# Patient Record
Sex: FEMALE | Race: BLACK OR AFRICAN AMERICAN | Employment: FULL TIME | URBAN - METROPOLITAN AREA
[De-identification: names, ages, dates, MRNs, and addresses within clinical notes are randomized per-mention and may not be internally consistent; named-entity substitution may affect disease eponyms.]

---

## 2017-05-01 ENCOUNTER — HOSPITAL ENCOUNTER (EMERGENCY)
Age: 62
Discharge: HOME | End: 2017-05-01
Payer: MEDICARE

## 2017-05-01 ENCOUNTER — APPOINTMENT (OUTPATIENT)
Dept: GENERAL RADIOLOGY | Age: 62
End: 2017-05-01
Payer: MEDICARE

## 2017-05-01 VITALS
HEART RATE: 86 BPM | WEIGHT: 260.4 LBS | BODY MASS INDEX: 44.46 KG/M2 | RESPIRATION RATE: 14 BRPM | TEMPERATURE: 97.8 F | HEIGHT: 64 IN | OXYGEN SATURATION: 98 % | DIASTOLIC BLOOD PRESSURE: 58 MMHG | SYSTOLIC BLOOD PRESSURE: 134 MMHG

## 2017-05-01 DIAGNOSIS — S80.02XA CONTUSION OF LEFT KNEE, INITIAL ENCOUNTER: Primary | ICD-10-CM

## 2017-05-01 PROCEDURE — 73562 X-RAY EXAM OF KNEE 3: CPT

## 2017-05-01 PROCEDURE — 99283 EMERGENCY DEPT VISIT LOW MDM: CPT

## 2017-05-01 RX ORDER — ACETAMINOPHEN AND CODEINE PHOSPHATE 300; 30 MG/1; MG/1
1-2 TABLET ORAL 3 TIMES DAILY PRN
Qty: 20 TABLET | Refills: 0 | Status: SHIPPED | OUTPATIENT
Start: 2017-05-01 | End: 2017-08-30 | Stop reason: CLARIF

## 2017-05-01 ASSESSMENT — PAIN DESCRIPTION - ORIENTATION: ORIENTATION: LEFT

## 2017-05-01 ASSESSMENT — PAIN SCALES - GENERAL: PAINLEVEL_OUTOF10: 8

## 2017-05-01 ASSESSMENT — PAIN DESCRIPTION - LOCATION: LOCATION: KNEE

## 2017-05-01 ASSESSMENT — PAIN DESCRIPTION - PAIN TYPE: TYPE: ACUTE PAIN

## 2017-06-05 ENCOUNTER — TELEPHONE (OUTPATIENT)
Dept: BARIATRICS/WEIGHT MGMT | Age: 62
End: 2017-06-05

## 2017-06-30 ENCOUNTER — OFFICE VISIT (OUTPATIENT)
Dept: BARIATRICS/WEIGHT MGMT | Age: 62
End: 2017-06-30
Payer: MEDICARE

## 2017-06-30 ENCOUNTER — NURSE ONLY (OUTPATIENT)
Dept: BARIATRICS/WEIGHT MGMT | Age: 62
End: 2017-06-30

## 2017-06-30 VITALS
HEART RATE: 74 BPM | WEIGHT: 266 LBS | HEIGHT: 64 IN | SYSTOLIC BLOOD PRESSURE: 126 MMHG | RESPIRATION RATE: 20 BRPM | DIASTOLIC BLOOD PRESSURE: 74 MMHG | BODY MASS INDEX: 45.41 KG/M2

## 2017-06-30 VITALS — BODY MASS INDEX: 45.66 KG/M2 | WEIGHT: 266 LBS

## 2017-06-30 DIAGNOSIS — R73.03 PREDIABETES: Primary | ICD-10-CM

## 2017-06-30 DIAGNOSIS — E66.01 OBESITY, MORBID, BMI 40.0-49.9 (HCC): ICD-10-CM

## 2017-06-30 PROCEDURE — 99213 OFFICE O/P EST LOW 20 MIN: CPT | Performed by: NURSE PRACTITIONER

## 2017-07-07 ENCOUNTER — HOSPITAL ENCOUNTER (OUTPATIENT)
Age: 62
Discharge: HOME | End: 2017-07-07
Payer: MEDICARE

## 2017-07-07 PROCEDURE — 93005 ELECTROCARDIOGRAM TRACING: CPT

## 2017-07-08 LAB
EKG ATRIAL RATE: 98 BPM
EKG P AXIS: 57 DEGREES
EKG P-R INTERVAL: 140 MS
EKG Q-T INTERVAL: 376 MS
EKG QRS DURATION: 76 MS
EKG QTC CALCULATION (BAZETT): 480 MS
EKG R AXIS: 24 DEGREES
EKG T AXIS: 36 DEGREES
EKG VENTRICULAR RATE: 98 BPM

## 2017-07-09 ENCOUNTER — HOSPITAL ENCOUNTER (OUTPATIENT)
Age: 62
Discharge: HOME | End: 2017-07-09
Payer: MEDICARE

## 2017-07-09 DIAGNOSIS — R73.03 PREDIABETES: ICD-10-CM

## 2017-07-09 DIAGNOSIS — E66.01 OBESITY, MORBID, BMI 40.0-49.9 (HCC): ICD-10-CM

## 2017-07-09 LAB
ABSOLUTE EOS #: 0.2 K/UL (ref 0–0.4)
ABSOLUTE LYMPH #: 1.6 K/UL (ref 1–4.8)
ABSOLUTE MONO #: 0.5 K/UL (ref 0.1–1.2)
ALBUMIN SERPL-MCNC: 3.6 G/DL (ref 3.5–5.2)
ALBUMIN/GLOBULIN RATIO: 0.9 (ref 1–2.5)
ALP BLD-CCNC: 81 U/L (ref 35–104)
ALT SERPL-CCNC: 12 U/L (ref 5–33)
ANION GAP SERPL CALCULATED.3IONS-SCNC: 11 MMOL/L (ref 9–17)
AST SERPL-CCNC: 15 U/L
BASOPHILS # BLD: 1 %
BASOPHILS ABSOLUTE: 0 K/UL (ref 0–0.2)
BILIRUB SERPL-MCNC: 0.28 MG/DL (ref 0.3–1.2)
BUN BLDV-MCNC: 10 MG/DL (ref 8–23)
BUN/CREAT BLD: ABNORMAL (ref 9–20)
CALCIUM SERPL-MCNC: 9.6 MG/DL (ref 8.6–10.4)
CHLORIDE BLD-SCNC: 104 MMOL/L (ref 98–107)
CHOLESTEROL/HDL RATIO: 2.1
CHOLESTEROL: 124 MG/DL
CO2: 27 MMOL/L (ref 20–31)
CREAT SERPL-MCNC: 0.86 MG/DL (ref 0.5–0.9)
DIFFERENTIAL TYPE: ABNORMAL
EOSINOPHILS RELATIVE PERCENT: 4 %
GFR AFRICAN AMERICAN: >60 ML/MIN
GFR NON-AFRICAN AMERICAN: >60 ML/MIN
GFR SERPL CREATININE-BSD FRML MDRD: ABNORMAL ML/MIN/{1.73_M2}
GFR SERPL CREATININE-BSD FRML MDRD: ABNORMAL ML/MIN/{1.73_M2}
GLUCOSE BLD-MCNC: 111 MG/DL (ref 70–99)
HCT VFR BLD CALC: 39 % (ref 36–46)
HDLC SERPL-MCNC: 58 MG/DL
HEMOGLOBIN: 13 G/DL (ref 12–16)
LDL CHOLESTEROL: 47 MG/DL (ref 0–130)
LYMPHOCYTES # BLD: 29 %
MCH RBC QN AUTO: 28.2 PG (ref 26–34)
MCHC RBC AUTO-ENTMCNC: 33.2 G/DL (ref 31–37)
MCV RBC AUTO: 84.8 FL (ref 80–100)
MONOCYTES # BLD: 10 %
PDW BLD-RTO: 16 % (ref 12.5–15.4)
PLATELET # BLD: 276 K/UL (ref 140–450)
PLATELET ESTIMATE: ABNORMAL
PMV BLD AUTO: 8.7 FL (ref 6–12)
POTASSIUM SERPL-SCNC: 4.1 MMOL/L (ref 3.7–5.3)
RBC # BLD: 4.61 M/UL (ref 4–5.2)
RBC # BLD: ABNORMAL 10*6/UL
SEG NEUTROPHILS: 56 %
SEGMENTED NEUTROPHILS ABSOLUTE COUNT: 3.2 K/UL (ref 1.8–7.7)
SODIUM BLD-SCNC: 142 MMOL/L (ref 135–144)
TOTAL PROTEIN: 7.4 G/DL (ref 6.4–8.3)
TRIGL SERPL-MCNC: 97 MG/DL
VLDLC SERPL CALC-MCNC: NORMAL MG/DL (ref 1–30)
WBC # BLD: 5.6 K/UL (ref 3.5–11)
WBC # BLD: ABNORMAL 10*3/UL

## 2017-07-09 PROCEDURE — 80061 LIPID PANEL: CPT

## 2017-07-09 PROCEDURE — 80053 COMPREHEN METABOLIC PANEL: CPT

## 2017-07-09 PROCEDURE — 83036 HEMOGLOBIN GLYCOSYLATED A1C: CPT

## 2017-07-09 PROCEDURE — 36415 COLL VENOUS BLD VENIPUNCTURE: CPT

## 2017-07-09 PROCEDURE — 85025 COMPLETE CBC W/AUTO DIFF WBC: CPT

## 2017-07-10 DIAGNOSIS — E66.01 OBESITY, MORBID, BMI 40.0-49.9 (HCC): ICD-10-CM

## 2017-07-10 DIAGNOSIS — R73.03 PREDIABETES: Primary | ICD-10-CM

## 2017-07-10 LAB
ESTIMATED AVERAGE GLUCOSE: 137 MG/DL
HBA1C MFR BLD: 6.4 % (ref 4–6)

## 2017-07-27 ENCOUNTER — OFFICE VISIT (OUTPATIENT)
Dept: BARIATRICS/WEIGHT MGMT | Age: 62
End: 2017-07-27
Payer: MEDICARE

## 2017-07-27 VITALS
WEIGHT: 270 LBS | DIASTOLIC BLOOD PRESSURE: 72 MMHG | HEIGHT: 64 IN | BODY MASS INDEX: 46.1 KG/M2 | SYSTOLIC BLOOD PRESSURE: 126 MMHG | HEART RATE: 95 BPM

## 2017-07-27 DIAGNOSIS — E66.01 OBESITY, MORBID, BMI 40.0-49.9 (HCC): ICD-10-CM

## 2017-07-27 DIAGNOSIS — R73.03 PREDIABETES: Primary | ICD-10-CM

## 2017-07-27 PROCEDURE — 99213 OFFICE O/P EST LOW 20 MIN: CPT | Performed by: NURSE PRACTITIONER

## 2017-08-24 ENCOUNTER — TELEPHONE (OUTPATIENT)
Dept: BARIATRICS/WEIGHT MGMT | Age: 62
End: 2017-08-24

## 2017-08-24 ENCOUNTER — OFFICE VISIT (OUTPATIENT)
Dept: BARIATRICS/WEIGHT MGMT | Age: 62
End: 2017-08-24
Payer: MEDICARE

## 2017-08-24 VITALS
HEIGHT: 64 IN | DIASTOLIC BLOOD PRESSURE: 74 MMHG | SYSTOLIC BLOOD PRESSURE: 132 MMHG | RESPIRATION RATE: 20 BRPM | WEIGHT: 268 LBS | HEART RATE: 92 BPM | BODY MASS INDEX: 45.75 KG/M2

## 2017-08-24 DIAGNOSIS — E66.01 OBESITY, MORBID, BMI 40.0-49.9 (HCC): ICD-10-CM

## 2017-08-24 DIAGNOSIS — R73.03 PREDIABETES: Primary | ICD-10-CM

## 2017-08-24 PROCEDURE — 99213 OFFICE O/P EST LOW 20 MIN: CPT | Performed by: NURSE PRACTITIONER

## 2017-08-30 ENCOUNTER — HOSPITAL ENCOUNTER (EMERGENCY)
Age: 62
Discharge: HOME | End: 2017-08-30
Attending: EMERGENCY MEDICINE
Payer: MEDICARE

## 2017-08-30 ENCOUNTER — APPOINTMENT (OUTPATIENT)
Dept: GENERAL RADIOLOGY | Age: 62
End: 2017-08-30
Payer: MEDICARE

## 2017-08-30 VITALS
WEIGHT: 267 LBS | DIASTOLIC BLOOD PRESSURE: 60 MMHG | SYSTOLIC BLOOD PRESSURE: 142 MMHG | RESPIRATION RATE: 16 BRPM | HEART RATE: 86 BPM | BODY MASS INDEX: 45.58 KG/M2 | HEIGHT: 64 IN | OXYGEN SATURATION: 97 % | TEMPERATURE: 99.3 F

## 2017-08-30 DIAGNOSIS — R03.0 ELEVATED BLOOD PRESSURE READING: ICD-10-CM

## 2017-08-30 DIAGNOSIS — M17.11 OSTEOARTHRITIS OF RIGHT KNEE, UNSPECIFIED OSTEOARTHRITIS TYPE: Primary | ICD-10-CM

## 2017-08-30 PROCEDURE — 73562 X-RAY EXAM OF KNEE 3: CPT

## 2017-08-30 PROCEDURE — 96372 THER/PROPH/DIAG INJ SC/IM: CPT

## 2017-08-30 PROCEDURE — 99283 EMERGENCY DEPT VISIT LOW MDM: CPT

## 2017-08-30 PROCEDURE — 6360000002 HC RX W HCPCS: Performed by: PHYSICIAN ASSISTANT

## 2017-08-30 RX ORDER — KETOROLAC TROMETHAMINE 30 MG/ML
60 INJECTION, SOLUTION INTRAMUSCULAR; INTRAVENOUS ONCE
Status: COMPLETED | OUTPATIENT
Start: 2017-08-30 | End: 2017-08-30

## 2017-08-30 RX ORDER — NAPROXEN 500 MG/1
500 TABLET ORAL 2 TIMES DAILY WITH MEALS
Qty: 20 TABLET | Refills: 0 | Status: SHIPPED | OUTPATIENT
Start: 2017-08-30 | End: 2017-09-28

## 2017-08-30 RX ORDER — HYDROCODONE BITARTRATE AND ACETAMINOPHEN 5; 325 MG/1; MG/1
1 TABLET ORAL EVERY 6 HOURS PRN
Qty: 20 TABLET | Refills: 0 | Status: SHIPPED | OUTPATIENT
Start: 2017-08-30 | End: 2017-09-06

## 2017-08-30 RX ADMIN — KETOROLAC TROMETHAMINE 60 MG: 30 INJECTION, SOLUTION INTRAMUSCULAR at 22:34

## 2017-08-30 ASSESSMENT — PAIN SCALES - GENERAL
PAINLEVEL_OUTOF10: 9
PAINLEVEL_OUTOF10: 9

## 2017-08-30 ASSESSMENT — PAIN DESCRIPTION - DESCRIPTORS: DESCRIPTORS: ACHING

## 2017-08-30 ASSESSMENT — PAIN DESCRIPTION - ORIENTATION: ORIENTATION: RIGHT

## 2017-08-30 ASSESSMENT — PAIN DESCRIPTION - FREQUENCY: FREQUENCY: CONTINUOUS

## 2017-08-30 ASSESSMENT — PAIN DESCRIPTION - LOCATION: LOCATION: KNEE

## 2017-08-31 ENCOUNTER — TELEPHONE (OUTPATIENT)
Dept: ORTHOPEDIC SURGERY | Age: 62
End: 2017-08-31

## 2017-09-21 ENCOUNTER — OFFICE VISIT (OUTPATIENT)
Dept: BARIATRICS/WEIGHT MGMT | Age: 62
End: 2017-09-21
Payer: MEDICARE

## 2017-09-21 VITALS
HEART RATE: 104 BPM | HEIGHT: 64 IN | DIASTOLIC BLOOD PRESSURE: 78 MMHG | BODY MASS INDEX: 45.07 KG/M2 | SYSTOLIC BLOOD PRESSURE: 120 MMHG | WEIGHT: 264 LBS

## 2017-09-21 DIAGNOSIS — R73.03 PREDIABETES: Primary | ICD-10-CM

## 2017-09-21 DIAGNOSIS — E66.01 OBESITY, MORBID, BMI 40.0-49.9 (HCC): ICD-10-CM

## 2017-09-21 DIAGNOSIS — M17.11 ARTHRITIS OF RIGHT KNEE: ICD-10-CM

## 2017-09-21 PROCEDURE — 99213 OFFICE O/P EST LOW 20 MIN: CPT | Performed by: NURSE PRACTITIONER

## 2017-09-28 ENCOUNTER — OFFICE VISIT (OUTPATIENT)
Dept: ORTHOPEDIC SURGERY | Age: 62
End: 2017-09-28
Payer: MEDICARE

## 2017-09-28 VITALS — HEIGHT: 65 IN | BODY MASS INDEX: 43.99 KG/M2 | WEIGHT: 264 LBS

## 2017-09-28 DIAGNOSIS — M17.11 PRIMARY OSTEOARTHRITIS OF RIGHT KNEE: Primary | ICD-10-CM

## 2017-09-28 DIAGNOSIS — G89.29 CHRONIC PAIN OF RIGHT KNEE: ICD-10-CM

## 2017-09-28 DIAGNOSIS — M25.561 CHRONIC PAIN OF RIGHT KNEE: ICD-10-CM

## 2017-09-28 DIAGNOSIS — E66.01 OBESITY, MORBID, BMI 40.0-49.9 (HCC): ICD-10-CM

## 2017-09-28 PROCEDURE — 99204 OFFICE O/P NEW MOD 45 MIN: CPT | Performed by: ORTHOPAEDIC SURGERY

## 2017-09-28 RX ORDER — HYDROCODONE BITARTRATE AND ACETAMINOPHEN 5; 325 MG/1; MG/1
1 TABLET ORAL 2 TIMES DAILY
COMMUNITY
Start: 2017-08-30 | End: 2017-09-28 | Stop reason: ALTCHOICE

## 2017-10-02 ENCOUNTER — TELEPHONE (OUTPATIENT)
Dept: ORTHOPEDIC SURGERY | Age: 62
End: 2017-10-02

## 2017-10-04 RX ORDER — IBUPROFEN 800 MG/1
800 TABLET ORAL EVERY 8 HOURS PRN
Qty: 120 TABLET | Refills: 3 | Status: SHIPPED | OUTPATIENT
Start: 2017-10-04 | End: 2021-02-15

## 2017-10-05 ENCOUNTER — HOSPITAL ENCOUNTER (OUTPATIENT)
Dept: PHYSICAL THERAPY | Facility: CLINIC | Age: 62
Setting detail: THERAPIES SERIES
Discharge: HOME | End: 2017-10-05
Payer: MEDICARE

## 2017-10-05 PROCEDURE — 97110 THERAPEUTIC EXERCISES: CPT

## 2017-10-05 PROCEDURE — 97162 PT EVAL MOD COMPLEX 30 MIN: CPT

## 2017-10-05 NOTE — CONSULTS
Gait Deviations trouble with stairs/not prolonged walking  [] Other:      STG: (to be met in 6 treatments)  1. ? Pain: 3/10 max  2. ? ROM:flex 115A, EXT -5A  3. ? Strength: 5/5 R hip and knee  4. ? Function:improve steps, begin walking for longer periods of time  5. Independent with Home Exercise Programs    LTG: (to be met in 12 treatments)  1. Pain 2 or less  2. Normal gait on level surfaces and stairs. 3. Care for her mom w/o difficulty                   Patient goals:improve knee, remove pain    Rehab Potential:  [x] Good  [] Fair  [] Poor   Suggested Professional Referral:  [x] No  [] Yes:  Barriers to Goal Achievement[de-identified]  [] No  [x] Yes: OA   Domestic Concerns:  [x] No  [] Yes:    Pt. Education:  [x] Plans/Goals, Risks/Benefits discussed  [x] Home exercise program    Method of Education: [x] Verbal  [x] Demo  [x] Written see ex's below  Comprehension of Education:  [x] Verbalizes understanding. [x] Demonstrates understanding. [] Needs Review. [] Demonstrates/verbalizes understanding of HEP/Ed previously given. Treatment Plan:  [x] Therapeutic Exercise    [] Modalities:  [] Therapeutic Activity    [] Ultrasound  [] Electrical Stimulation  [] Gait Training     [] Massage       [] Lumbar/Cervical Traction  [] Neuromuscular Re-education [x] Cold/hotpack PRN [] Iontophoresis: 4 mg/mL  [x] Instruction in HEP             Dexamethasone Sodium  [] Manual Therapy             Phosphate 40-80 mAmin  [] Aquatic Therapy  [] Vasocompression/    [] Other:       Game Ready    []  Medication allergies reviewed for use of    Dexamethasone Sodium Phosphate 4mg/ml     with iontophoresis treatments. Pt is not allergic. Frequency:  2 x/week for 12 visits- may decrease to 1x/week per pts request once she feels she has a handle on the exercises.         Todays Treatment:  Modalities:   Precautions:  Exercise Reps/ Time Weight/ Level Comments   SCI-FIT      PROM      PATELLA MOBS            QUAD SETS 15 1#    HS SETS

## 2017-10-10 ENCOUNTER — HOSPITAL ENCOUNTER (OUTPATIENT)
Dept: PHYSICAL THERAPY | Facility: CLINIC | Age: 62
Setting detail: THERAPIES SERIES
Discharge: HOME | End: 2017-10-10
Payer: MEDICARE

## 2017-10-10 NOTE — FLOWSHEET NOTE
[] Regulo Sofy        Outpatient Physical                Therapy       [address]       Phone: [phone]       Fax: [phone] [] [facility]       Promotion at [address] [place] [address]       Phone: [phone]       Fax: [phone] [] [facility]     [address] [place] [address]      Phone: [phone]      Fax:  [phone]     Physical Therapy Cancel/No Show note    Date: 10/10/2017  Patient: Rosa M Lopez  : 1955  MRN: [id number]    Cancels/No Shows to date:     For today's appointment patient:  [x]  Cancelled  []  Rescheduled appointment  []  No-show     Reason given by patient:  []  Patient ill  []  Conflicting appointment  []  No transportation    []  Conflict with work  [x]  No reason given  []  Weather related  []  Other:     Comments:  Patient confirmed next appt per Akil Carlisle     Electronically signed by: Castro Vilchis PTA

## 2017-10-12 ENCOUNTER — HOSPITAL ENCOUNTER (OUTPATIENT)
Dept: PHYSICAL THERAPY | Facility: CLINIC | Age: 62
Setting detail: THERAPIES SERIES
Discharge: HOME | End: 2017-10-12
Payer: MEDICARE

## 2017-10-12 PROCEDURE — 97110 THERAPEUTIC EXERCISES: CPT

## 2017-10-12 NOTE — FLOWSHEET NOTE
[] Karma Livingston       Outpatient Physical        Therapy       [address] [place]       Phone: [phone]       Fax: [phone] [x] [facility] at [address]       Phone: [phone]       Fax: [phone] [] Yonathan. [address] [place] [address] Health Promotion  [address] [place] [address]   Phone: [phone]   Fax:  [phone]     Physical Therapy Daily Treatment Note    Date:  10/12/2017  Patient Name:  Davon Gonzales    :  1955  MRN: [id number]  Physician:DR Banegas 88: [employer]   Medical Diagnosis:OA R KNEE, CHRONIC PAIN          Rehab Codes: M25.561,M25.661,R26.2,M62.551  Onset date: 17                                                                               Next 's appt.: 10/26/17     Visit# / total visits: 2/12  Cancels/No Shows: 1/0    Subjective:    Pain:  [] Yes  [x] No Location: R knee Pain Rating: (0-10 scale) 5/10  Pain altered Tx:  [] No  [x] Yes  Action: some exercises completed without weights  Comments: Pt states she is very sore since beginning HEP and that she is taking Tylenol 3's without much pain relief.      Objective:  Modalities: CP anterior R knee in supine with wedge after exercises x 15 minutes  Precautions:  Exercise: R knee Reps/ Time Weight/ Level Comments   SCI-FIT  4 min  L1  initiated 10/12   PROM         PATELLA MOBS                   QUAD SETS 15x 2\" hold     HS SETS         HEEL SLIDES  15    orange tube; initiated 10/12    SAQ'S 15 1#  no weight 10/12   SLR 15 1#               SIDE HIP ABD 15 1#     SIDE HIP ADD                   PRONE         HIP EXT  15  1#     HS CURLS 15 1#               LAQ'S 15 1#      hamstring curls   15  green TB   initiated 10/12         STANDING      Marching 15x ea   initiated 10/12   Hamstring curls 15x ea   initiated 10/12   3 way HIP (no AD) 15x ea AROM

## 2017-10-17 ENCOUNTER — HOSPITAL ENCOUNTER (OUTPATIENT)
Dept: PHYSICAL THERAPY | Facility: CLINIC | Age: 62
Setting detail: THERAPIES SERIES
Discharge: HOME | End: 2017-10-17
Payer: MEDICARE

## 2017-10-19 ENCOUNTER — OFFICE VISIT (OUTPATIENT)
Dept: ORTHOPEDIC SURGERY | Age: 62
End: 2017-10-19
Payer: MEDICARE

## 2017-10-19 VITALS — WEIGHT: 264 LBS | BODY MASS INDEX: 43.99 KG/M2 | HEIGHT: 65 IN

## 2017-10-19 DIAGNOSIS — M17.11 PRIMARY OSTEOARTHRITIS OF RIGHT KNEE: Primary | ICD-10-CM

## 2017-10-19 PROCEDURE — 3017F COLORECTAL CA SCREEN DOC REV: CPT | Performed by: ORTHOPAEDIC SURGERY

## 2017-10-19 PROCEDURE — G8484 FLU IMMUNIZE NO ADMIN: HCPCS | Performed by: ORTHOPAEDIC SURGERY

## 2017-10-19 PROCEDURE — 1036F TOBACCO NON-USER: CPT | Performed by: ORTHOPAEDIC SURGERY

## 2017-10-19 PROCEDURE — G8427 DOCREV CUR MEDS BY ELIG CLIN: HCPCS | Performed by: ORTHOPAEDIC SURGERY

## 2017-10-19 PROCEDURE — G8417 CALC BMI ABV UP PARAM F/U: HCPCS | Performed by: ORTHOPAEDIC SURGERY

## 2017-10-19 PROCEDURE — 3014F SCREEN MAMMO DOC REV: CPT | Performed by: ORTHOPAEDIC SURGERY

## 2017-10-19 PROCEDURE — 99213 OFFICE O/P EST LOW 20 MIN: CPT | Performed by: ORTHOPAEDIC SURGERY

## 2017-10-19 RX ORDER — TRAMADOL HYDROCHLORIDE 50 MG/1
50 TABLET ORAL EVERY 6 HOURS PRN
Qty: 60 TABLET | Refills: 0 | Status: SHIPPED | OUTPATIENT
Start: 2017-10-19 | End: 2017-10-29

## 2017-10-19 NOTE — PROGRESS NOTES
This patient is seen here today complaining of pain in the right knee. She says that she is to have pain in the left knee but that knee is better now. The patient says that the she hasn't had any injection in the knee before. She thought that I had told her that there she would not be a candidate. However I cannot think of any contraindication. She locates the pain all the way around the knee joint but more so anteriorly than posteriorly. It is not located over the medial or the lateral joint line. No history of instability or locking episodes. Her blood sugar this morning when we measured it to here in the office was 146. She has not taken her medication this morning. Examination: There was generalized tenderness of the knee. There is crepitation in all the compartments. There is no instability. There is valgus deformity on standing on the right side. She has full extension and flexes up to 95°. Diagnosis: Osteoarthritis right knee. #2 bilateral flatfeet. Treatment: I offered to inject the right knee but she says she would rather try out tramadol which her friend has been using and found very helpful. She also showed me a bottle with that in Tylenol 3 which was prescribed for some other conditions but I told her this is narcotic and therefore I would not be able to prescribe that. This is since this is a long-term problem. I did write her a prescription for tramadol and if he doesn't work out then she will return here for a intra-articular injection. If she does return she has been warned to take her medications before coming to the office for injection.

## 2017-10-24 ENCOUNTER — HOSPITAL ENCOUNTER (OUTPATIENT)
Dept: PHYSICAL THERAPY | Facility: CLINIC | Age: 62
Setting detail: THERAPIES SERIES
Discharge: HOME | End: 2017-10-24
Payer: MEDICARE

## 2017-10-24 PROCEDURE — 97110 THERAPEUTIC EXERCISES: CPT

## 2017-10-24 NOTE — FLOWSHEET NOTE
[] Shazia Aguilera       Outpatient Physical        Therapy       [address]       Phone: [phone]       Fax: [phone] [x] [facility] at [address]       Phone: [phone]       Fax: [phone] [] Teddyana. [facility] Health Promotion  [address] [place] [address]   Phone: [phone]   Fax:  [phone]     Physical Therapy Daily Treatment Note    Date:  10/24/2017  Patient Name:  Yusuf Gu    :  1955  MRN: [id number]  Physician:DR Banegas 88: [employer]   Medical Diagnosis:OA R KNEE, CHRONIC PAIN          Rehab Codes: M25.561,M25.661,R26.2,M62.551  Onset date: 17                                                                       Next 's appt.: none at this time     Visit# / total visits: 3/12  Cancels/No Shows:     Subjective:    Pain:  [] Yes  [x] No Location: R knee Pain Rating: (0-10 scale) 2/10  Pain altered Tx:  [x] No  [x] Yes  Action:   Comments: Pt reports she was sick last week and that is why she missed last appt. Pt states she went to  on 10/19 and she is now taking 2 tramadol a day and the pain is significantly less. Reports completed some HEP last week.     Objective:  Modalities: CP anterior R knee in supine with wedge after exercises x 15 minutes- held 10/24  Precautions:  Exercise: R knee Reps/ Time Weight/ Level Comments   SCI-FIT  5 min  L1  initiated 10/12   PROM         PATELLA MOBS                   QUAD SETS 15x 2\" hold     HS SETS         HEEL SLIDES  15x    orange tube; initiated 10/12    SAQ'S 15 1#     SLR 15 1#               SIDE HIP ABD 15 1#     SIDE HIP ADD                   PRONE         HIP EXT  15  1#     HS CURLS 15 1#      Quad stretch  2 x 20\"   initiated 10/24         LAQ'S 15 1#      hamstring curls   15  green TB    hamstring stretch 2x20\"  initiated 10/24 exercises  [] Written  Comprehension of Education:  [x] Verbalizes understanding. [x] Demonstrates understanding. [x] Needs review. [] Demonstrates/verbalizes HEP/Ed previously given. Plan: [x] Continue per plan of care. [x] Other: 2 x/week for 12 visits- may decrease to 1x/week per pts request once she feels she has a handle on the exercises.       Time In: 2:45pm           Time Out: 3:20pm    Electronically signed by:  Amanda Lazo PTA

## 2017-10-31 ENCOUNTER — HOSPITAL ENCOUNTER (OUTPATIENT)
Dept: PHYSICAL THERAPY | Facility: CLINIC | Age: 62
Setting detail: THERAPIES SERIES
Discharge: HOME | End: 2017-10-31
Payer: MEDICARE

## 2017-10-31 PROCEDURE — 97110 THERAPEUTIC EXERCISES: CPT

## 2017-10-31 NOTE — FLOWSHEET NOTE
[] Ambreen Marrufo       Outpatient Physical        Therapy       [address]       Phone: [phone]       Fax: [phone] [x] [facility] at [address]       Phone: [phone]       Fax: [phone] [] Teddyana. [facility] Health Promotion  [address] [place] [address]   Phone: [phone]   Fax:  [phone]     Physical Therapy Daily Treatment Note    Date:  10/31/2017  Patient Name:  Trina Diaz    :  1955  MRN: [id number]  Physician:DR Banegas 88: [employer]   Medical Diagnosis:OA R KNEE, CHRONIC PAIN          Rehab Codes: M25.561,M25.661,R26.2,M62.551  Onset date: 17                                                                       Next 's appt.: none at this time     Visit# / total visits:   Cancels/No Shows:     Subjective:    Pain:  [] Yes  [x] No Location: R knee Pain Rating: (0-10 scale) 4/10  Pain altered Tx:  [x] No  [x] Yes  Action:   Comments: Pt reports increased pain since last appt. States she went on a 1/2 mile walk over the weekend-- during the walk her knee felt ok, but after the walk her knee hurt very bad; describes the pain as throbbing in the anterior and posterior.     Objective:  Modalities: CP anterior R knee in supine with wedge after exercises x 15 minutes  Precautions:  Exercise: R knee Reps/ Time Weight/ Level Comments   SCI-FIT  5 min  L1  initiated 10/12   PROM         PATELLA MOBS                   QUAD SETS 15x 2\" hold     HS SETS         HEEL SLIDES  15x    orange tube; initiated 10/12    SAQ'S 15 1#     SLR 15 2# Progressed 10/31             SIDE HIP ABD 15 2#  progressed 10/31   SIDE HIP ADD                   PRONE         HIP EXT  10  2#     HS CURLS 10 2#      Quad stretch  2 x 20\"   initiated 10/24         LAQ'S 15 1#  progressed 10/31    hamstring curls  [employer]  green TB    hamstring stretch 2x20\"  initiated 10/24         STANDING      Marching 20x ea     Hamstring curls 20x ea     3 way HIP 15x ea Yellow TB  progressed 10/31   HEEL RAISES 20x    progressed 10/31   SQUATS-shallow 20x        calf stretch  3 x 30 sec    wedge:  initiated 10/12         STEP UPS  15x  4\" Progressed 10/31   STEPDOWNS  5x   4\"  initiated 10/31             TKE  20x  green TB   progressed 10/31             Other: completed bold type exercises; initiated new exercises this date and progressed exercises to strengthen RLE as noted in comment section    Specific Instructions for next treatment: advance ex's for R knee      Treatment Charges: Mins Units   [x]  Modalities: CP 15 0   [x]  Ther Exercise 30 2   []  Manual Therapy     []  Ther Activities     []  Aquatics     []  Vasocompression     []  Other     Total Treatment time 30 2       Assessment: [x] Progressing toward goals. Patient tolerated new exercises and progressions well with c/o increased pain during step downs. Reinitiated CP to address pain level and soreness after exercises. [] No change. [] Other:    Problems:  R knee  [x] ? Pain:                                              [x] ? ROM:                                             [x] ? Strength:                                        [x] ? Function:                                       [] ? Balance  [] Edema:  [] Postural Deviations  [x] Gait Deviations trouble with stairs/not prolonged walking  [] Other:                                      STG: (to be met in 6 treatments)  1. ? Pain: 3/10 max  2. ? ROM:flex 115A, EXT -5A  3. ? Strength: 5/5 R hip and knee  4. ? Function:improve steps, begin walking for longer periods of time  5. Independent with Home Exercise Programs     LTG: (to be met in 12 treatments)  1. Pain 2 or less  2. Normal gait on level surfaces and stairs. 3. Care for her mom w/o difficulty                    Patient goals:improve knee, remove pain      Pt. Education:  [] Yes  [] No  [x] Reviewed Prior HEP/Ed  Method of Education: [x] Verbal: new exercises  [x] Demo: new exercises  [] Written  Comprehension of Education:  [x] Verbalizes understanding. [x] Demonstrates understanding. [x] Needs review. [] Demonstrates/verbalizes HEP/Ed previously given. Plan: [x] Continue per plan of care. [x] Other: 2 x/week for 12 visits- may decrease to 1x/week per pts request once she feels she has a handle on the exercises.       Time In: 2:45pm           Time Out: 3:35pm    Electronically signed by:  Eddi Turcios PTA

## 2017-11-07 ENCOUNTER — HOSPITAL ENCOUNTER (OUTPATIENT)
Dept: PHYSICAL THERAPY | Facility: CLINIC | Age: 62
Setting detail: THERAPIES SERIES
Discharge: HOME | End: 2017-11-07
Payer: MEDICARE
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
## 2017-11-21 ENCOUNTER — OFFICE VISIT (OUTPATIENT)
Dept: ORTHOPEDIC SURGERY | Age: 62
End: 2017-11-21
Payer: MEDICARE

## 2017-11-21 VITALS — WEIGHT: 264 LBS | BODY MASS INDEX: 43.99 KG/M2 | HEIGHT: 65 IN

## 2017-11-21 DIAGNOSIS — M19.071 ARTHRITIS OF RIGHT FOOT: Primary | ICD-10-CM

## 2017-11-21 DIAGNOSIS — M17.11 PRIMARY OSTEOARTHRITIS OF RIGHT KNEE: ICD-10-CM

## 2017-11-21 PROCEDURE — G8484 FLU IMMUNIZE NO ADMIN: HCPCS | Performed by: ORTHOPAEDIC SURGERY

## 2017-11-21 PROCEDURE — 1036F TOBACCO NON-USER: CPT | Performed by: ORTHOPAEDIC SURGERY

## 2017-11-21 PROCEDURE — G8427 DOCREV CUR MEDS BY ELIG CLIN: HCPCS | Performed by: ORTHOPAEDIC SURGERY

## 2017-11-21 PROCEDURE — 3017F COLORECTAL CA SCREEN DOC REV: CPT | Performed by: ORTHOPAEDIC SURGERY

## 2017-11-21 PROCEDURE — 20610 DRAIN/INJ JOINT/BURSA W/O US: CPT | Performed by: ORTHOPAEDIC SURGERY

## 2017-11-21 PROCEDURE — G8417 CALC BMI ABV UP PARAM F/U: HCPCS | Performed by: ORTHOPAEDIC SURGERY

## 2017-11-21 PROCEDURE — 99213 OFFICE O/P EST LOW 20 MIN: CPT | Performed by: ORTHOPAEDIC SURGERY

## 2017-11-21 PROCEDURE — 3014F SCREEN MAMMO DOC REV: CPT | Performed by: ORTHOPAEDIC SURGERY

## 2017-11-21 RX ORDER — METHYLPREDNISOLONE ACETATE 40 MG/ML
40 INJECTION, SUSPENSION INTRA-ARTICULAR; INTRALESIONAL; INTRAMUSCULAR; SOFT TISSUE ONCE
Status: COMPLETED | OUTPATIENT
Start: 2017-11-21 | End: 2017-11-21

## 2017-11-21 RX ADMIN — METHYLPREDNISOLONE ACETATE 40 MG: 40 INJECTION, SUSPENSION INTRA-ARTICULAR; INTRALESIONAL; INTRAMUSCULAR; SOFT TISSUE at 07:36

## 2017-11-21 NOTE — PROGRESS NOTES
This patient with known while cusp primary osteoarthritis of the right knee is returning because she is continuing to have pain. At last visit she was offered injection but she wanted to try weight reduction using metformin. Patient says that there hasn't been much change in the weight. She still continues to have significant pain in the knee. The other complaint she has is that of pain on the dorsum of the right foot slightly to the lateral side. She says she gets a stinging pain every now and again like something is caught there. There is no history of injury. Examination: She still stands with a valgus knee. There is effusion in the knee joint. There is crepitation in all the compartments. Her knee motions are excellent and there is no instability. Examination of the foot shows that there is tenderness over the lateral side where the tendons for the extensors of the little toe are all bunched together. She has significant flatfoot deformity. However there is no pain on the sinus tarsi area. I think I can feel a spur in this area. Diagnosis: Primary valgus osteoarthritis right knee. #2 midfoot osteoarthritis right foot. Treatment: After thoroughly cleaning the skin with Betadine and alcohol I injected the knee with 40 mg of Depo-Medrol and 5 mL of 1% plain lidocaine. I'm also referring her for CT scan of the foot and we'll see her again after the scan.

## 2017-11-28 ENCOUNTER — HOSPITAL ENCOUNTER (OUTPATIENT)
Age: 62
Discharge: HOME | End: 2017-11-28
Payer: MEDICARE

## 2017-11-28 DIAGNOSIS — E66.01 OBESITY, MORBID, BMI 40.0-49.9 (HCC): ICD-10-CM

## 2017-11-28 DIAGNOSIS — R73.03 PREDIABETES: ICD-10-CM

## 2017-11-28 LAB
ABSOLUTE EOS #: 0.19 K/UL (ref 0–0.44)
ABSOLUTE IMMATURE GRANULOCYTE: <0.03 K/UL (ref 0–0.3)
ABSOLUTE LYMPH #: 1.88 K/UL (ref 1.1–3.7)
ABSOLUTE MONO #: 0.67 K/UL (ref 0.1–1.2)
ALBUMIN SERPL-MCNC: 4 G/DL (ref 3.5–5.2)
ALBUMIN/GLOBULIN RATIO: 1.1 (ref 1–2.5)
ALP BLD-CCNC: 84 U/L (ref 35–104)
ALT SERPL-CCNC: 13 U/L (ref 5–33)
ANION GAP SERPL CALCULATED.3IONS-SCNC: 13 MMOL/L (ref 9–17)
AST SERPL-CCNC: 17 U/L
BASOPHILS # BLD: 1 % (ref 0–2)
BASOPHILS ABSOLUTE: 0.04 K/UL (ref 0–0.2)
BILIRUB SERPL-MCNC: 0.22 MG/DL (ref 0.3–1.2)
BUN BLDV-MCNC: 11 MG/DL (ref 8–23)
BUN/CREAT BLD: ABNORMAL (ref 9–20)
CALCIUM SERPL-MCNC: 9.8 MG/DL (ref 8.6–10.4)
CHLORIDE BLD-SCNC: 105 MMOL/L (ref 98–107)
CHOLESTEROL/HDL RATIO: 2.2
CHOLESTEROL: 132 MG/DL
CO2: 27 MMOL/L (ref 20–31)
CREAT SERPL-MCNC: 0.83 MG/DL (ref 0.5–0.9)
DIFFERENTIAL TYPE: NORMAL
EOSINOPHILS RELATIVE PERCENT: 3 % (ref 1–4)
ESTIMATED AVERAGE GLUCOSE: 131 MG/DL
GFR AFRICAN AMERICAN: >60 ML/MIN
GFR NON-AFRICAN AMERICAN: >60 ML/MIN
GFR SERPL CREATININE-BSD FRML MDRD: ABNORMAL ML/MIN/{1.73_M2}
GFR SERPL CREATININE-BSD FRML MDRD: ABNORMAL ML/MIN/{1.73_M2}
GLUCOSE BLD-MCNC: 115 MG/DL (ref 70–99)
HBA1C MFR BLD: 6.2 % (ref 4–6)
HCT VFR BLD CALC: 41.5 % (ref 36.3–47.1)
HDLC SERPL-MCNC: 59 MG/DL
HEMOGLOBIN: 13.1 G/DL (ref 11.9–15.1)
IMMATURE GRANULOCYTES: 0 %
LDL CHOLESTEROL: 49 MG/DL (ref 0–130)
LYMPHOCYTES # BLD: 29 % (ref 24–43)
MCH RBC QN AUTO: 27.8 PG (ref 25.2–33.5)
MCHC RBC AUTO-ENTMCNC: 31.6 G/DL (ref 28.4–34.8)
MCV RBC AUTO: 88.1 FL (ref 82.6–102.9)
MONOCYTES # BLD: 10 % (ref 3–12)
PDW BLD-RTO: 14 % (ref 11.8–14.4)
PLATELET # BLD: 363 K/UL (ref 138–453)
PLATELET ESTIMATE: NORMAL
PMV BLD AUTO: 10.2 FL (ref 8.1–13.5)
POTASSIUM SERPL-SCNC: 4.2 MMOL/L (ref 3.7–5.3)
RBC # BLD: 4.71 M/UL (ref 3.95–5.11)
RBC # BLD: NORMAL 10*6/UL
SEG NEUTROPHILS: 57 % (ref 36–65)
SEGMENTED NEUTROPHILS ABSOLUTE COUNT: 3.8 K/UL (ref 1.5–8.1)
SODIUM BLD-SCNC: 145 MMOL/L (ref 135–144)
TOTAL PROTEIN: 7.6 G/DL (ref 6.4–8.3)
TRIGL SERPL-MCNC: 120 MG/DL
TSH SERPL DL<=0.05 MIU/L-ACNC: 2.04 MIU/L (ref 0.3–5)
VLDLC SERPL CALC-MCNC: NORMAL MG/DL (ref 1–30)
WBC # BLD: 6.6 K/UL (ref 3.5–11.3)
WBC # BLD: NORMAL 10*3/UL

## 2017-11-28 PROCEDURE — 84443 ASSAY THYROID STIM HORMONE: CPT

## 2017-11-28 PROCEDURE — 83036 HEMOGLOBIN GLYCOSYLATED A1C: CPT

## 2017-11-28 PROCEDURE — 80061 LIPID PANEL: CPT

## 2017-11-28 PROCEDURE — 85025 COMPLETE CBC W/AUTO DIFF WBC: CPT

## 2017-11-28 PROCEDURE — 36415 COLL VENOUS BLD VENIPUNCTURE: CPT

## 2017-11-28 PROCEDURE — 80053 COMPREHEN METABOLIC PANEL: CPT

## 2018-02-23 ENCOUNTER — TELEPHONE (OUTPATIENT)
Dept: BARIATRICS/WEIGHT MGMT | Age: 63
End: 2018-02-23

## 2018-03-14 ENCOUNTER — OFFICE VISIT (OUTPATIENT)
Dept: BARIATRICS/WEIGHT MGMT | Age: 63
End: 2018-03-14
Payer: COMMERCIAL

## 2018-03-14 VITALS
BODY MASS INDEX: 45.41 KG/M2 | WEIGHT: 266 LBS | DIASTOLIC BLOOD PRESSURE: 68 MMHG | RESPIRATION RATE: 20 BRPM | SYSTOLIC BLOOD PRESSURE: 120 MMHG | HEART RATE: 76 BPM | HEIGHT: 64 IN

## 2018-03-14 DIAGNOSIS — R73.03 PREDIABETES: Primary | ICD-10-CM

## 2018-03-14 DIAGNOSIS — E66.01 OBESITY, MORBID, BMI 40.0-49.9 (HCC): ICD-10-CM

## 2018-03-14 DIAGNOSIS — M17.11 ARTHRITIS OF RIGHT KNEE: ICD-10-CM

## 2018-03-14 PROCEDURE — G8427 DOCREV CUR MEDS BY ELIG CLIN: HCPCS | Performed by: NURSE PRACTITIONER

## 2018-03-14 PROCEDURE — 99213 OFFICE O/P EST LOW 20 MIN: CPT | Performed by: NURSE PRACTITIONER

## 2018-03-14 PROCEDURE — G8484 FLU IMMUNIZE NO ADMIN: HCPCS | Performed by: NURSE PRACTITIONER

## 2018-03-14 PROCEDURE — 3017F COLORECTAL CA SCREEN DOC REV: CPT | Performed by: NURSE PRACTITIONER

## 2018-03-14 PROCEDURE — 3014F SCREEN MAMMO DOC REV: CPT | Performed by: NURSE PRACTITIONER

## 2018-03-14 PROCEDURE — 1036F TOBACCO NON-USER: CPT | Performed by: NURSE PRACTITIONER

## 2018-03-14 PROCEDURE — G8417 CALC BMI ABV UP PARAM F/U: HCPCS | Performed by: NURSE PRACTITIONER

## 2018-03-14 NOTE — PROGRESS NOTES
Weight Loss Medication Follow-up Note    Subjective    The patient is a 65 y.o. female being seen regarding weight loss medication. The patient's PCP is Harley Gonzalez. She completed the GLB program in May 2016. Current weight is 266 lbs (gained 2 lbs since last visit 8/24/17). Current BMI os 44.97 kg/m2. Her weight goal is 138 lbs. She had a weight loss medication consultation 8/21/15 and Belviq was prescribed. The medication was denied by her insurance, but she was able to get a 15 day trial supply. She initially started taking the medication in September 2015 and used 10mg BID for 5 days, then decreased it to 10mg once daily to make the medication last longer. She ended up taking the medication for about a month and reports that she lost 7 lbs and had no side effects. She was prescribed Belviq again on 3/16/16 and patient called to report that she started taking the Belviq prescription on 5/19/16 and developed a reaction that included fatigue and just didn't feel right, so she stopped taking it on 5/29/16. Patient then agreed to try metformin, but had not seen significant weight loss and stopped taking it. We previously discussed Qsymia, but when she tried to fill it, she found out it was too expensive. She has since discovered that she can get a free trial and wants to try it. Comorbid Conditions:   Significant diseases affecting this patient are prediabetes and arthritis right knee. She is menopausal.  Not currently taking any antidepressive medication. No evidence of drug or alcohol abuse. Allergies:  No Known Allergies    Past Medical History:     Past Medical History:   Diagnosis Date    Morbid obesity ([facility] [address] 4/17/2015   .     Past Surgical History:  Past Surgical History:   Procedure Laterality Date    CHOLECYSTECTOMY  1/2000       Family History:  Family History   Problem Relation Age of Onset    Stroke Father        Social History:  Social History     Social History    Marital status: prescribed and comply with monitoring appointments. Drug information sheet given to patient. Call if having any problems. Follow up:   Return in about 1 month (around 4/14/2018). This encounters orders:  No orders of the defined types were placed in this encounter. This encounters prescriptions:  Orders Placed This Encounter   Medications    Phentermine-Topiramate 3.75-23 MG CP24     Sig: Take 1 tablet by mouth every morning (before breakfast) for 14 days See provider after 2 weeks dosing complete. .     Dispense:  14 capsule     Refill:  0       Electronically signed by:  Jeanna Bence, CNP

## 2018-03-30 ENCOUNTER — TELEPHONE (OUTPATIENT)
Dept: BARIATRICS/WEIGHT MGMT | Age: 63
End: 2018-03-30

## 2020-01-14 ENCOUNTER — TELEPHONE (OUTPATIENT)
Dept: BARIATRICS/WEIGHT MGMT | Age: 65
End: 2020-01-14

## 2020-01-14 NOTE — TELEPHONE ENCOUNTER
Patient called office today requesting to schedule an appointment with Laila Coyle to have lab work drawn and possibly manage pre diabetic care. Patient was part of our GLB program in 2016.  I advised patient she would need to follow up with her PCP to manage any additional health concerns not related directly to  Re participating in GLB program .

## 2020-02-11 ENCOUNTER — HOSPITAL ENCOUNTER (OUTPATIENT)
Age: 65
Discharge: HOME | End: 2020-02-13
Payer: COMMERCIAL

## 2020-02-11 ENCOUNTER — HOSPITAL ENCOUNTER (OUTPATIENT)
Dept: GENERAL RADIOLOGY | Age: 65
Discharge: HOME | End: 2020-02-13
Payer: COMMERCIAL

## 2020-02-11 ENCOUNTER — HOSPITAL ENCOUNTER (OUTPATIENT)
Age: 65
Discharge: HOME | End: 2020-02-11
Payer: COMMERCIAL

## 2020-02-11 LAB
ABSOLUTE EOS #: 0.27 K/UL (ref 0–0.44)
ABSOLUTE IMMATURE GRANULOCYTE: <0.03 K/UL (ref 0–0.3)
ABSOLUTE LYMPH #: 1.51 K/UL (ref 1.1–3.7)
ABSOLUTE MONO #: 0.47 K/UL (ref 0.1–1.2)
ALBUMIN SERPL-MCNC: 3.9 G/DL (ref 3.5–5.2)
ALBUMIN/GLOBULIN RATIO: 1.1 (ref 1–2.5)
ALP BLD-CCNC: 84 U/L (ref 35–104)
ALT SERPL-CCNC: 16 U/L (ref 5–33)
ANION GAP SERPL CALCULATED.3IONS-SCNC: 13 MMOL/L (ref 9–17)
AST SERPL-CCNC: 21 U/L
BASOPHILS # BLD: 1 % (ref 0–2)
BASOPHILS ABSOLUTE: 0.04 K/UL (ref 0–0.2)
BILIRUB SERPL-MCNC: 0.2 MG/DL (ref 0.3–1.2)
BILIRUBIN DIRECT: <0.08 MG/DL
BILIRUBIN, INDIRECT: ABNORMAL MG/DL (ref 0–1)
BUN BLDV-MCNC: 9 MG/DL (ref 8–23)
CALCIUM SERPL-MCNC: 10.8 MG/DL (ref 8.6–10.4)
CHLORIDE BLD-SCNC: 106 MMOL/L (ref 98–107)
CHOLESTEROL/HDL RATIO: 2.1
CHOLESTEROL: 115 MG/DL
CO2: 23 MMOL/L (ref 20–31)
CREAT SERPL-MCNC: 0.74 MG/DL (ref 0.5–0.9)
CREATININE URINE: 113.2 MG/DL (ref 28–217)
DIFFERENTIAL TYPE: ABNORMAL
EOSINOPHILS RELATIVE PERCENT: 6 % (ref 1–4)
ESTIMATED AVERAGE GLUCOSE: 140 MG/DL
GFR AFRICAN AMERICAN: >60 ML/MIN
GFR NON-AFRICAN AMERICAN: >60 ML/MIN
GFR SERPL CREATININE-BSD FRML MDRD: ABNORMAL ML/MIN/{1.73_M2}
GFR SERPL CREATININE-BSD FRML MDRD: ABNORMAL ML/MIN/{1.73_M2}
GLUCOSE BLD-MCNC: 110 MG/DL (ref 70–99)
HBA1C MFR BLD: 6.5 % (ref 4–6)
HCT VFR BLD CALC: 40.5 % (ref 36.3–47.1)
HDLC SERPL-MCNC: 55 MG/DL
HEMOGLOBIN: 13 G/DL (ref 11.9–15.1)
IMMATURE GRANULOCYTES: 0 %
LDL CHOLESTEROL: 37 MG/DL (ref 0–130)
LYMPHOCYTES # BLD: 31 % (ref 24–43)
MCH RBC QN AUTO: 28.4 PG (ref 25.2–33.5)
MCHC RBC AUTO-ENTMCNC: 32.1 G/DL (ref 28.4–34.8)
MCV RBC AUTO: 88.4 FL (ref 82.6–102.9)
MICROALBUMIN/CREAT 24H UR: <12 MG/L
MICROALBUMIN/CREAT UR-RTO: NORMAL MCG/MG CREAT
MONOCYTES # BLD: 10 % (ref 3–12)
NRBC AUTOMATED: 0 PER 100 WBC
PDW BLD-RTO: 14 % (ref 11.8–14.4)
PLATELET # BLD: 319 K/UL (ref 138–453)
PLATELET ESTIMATE: ABNORMAL
PMV BLD AUTO: 10.4 FL (ref 8.1–13.5)
POTASSIUM SERPL-SCNC: 4.5 MMOL/L (ref 3.7–5.3)
RBC # BLD: 4.58 M/UL (ref 3.95–5.11)
RBC # BLD: ABNORMAL 10*6/UL
SEG NEUTROPHILS: 52 % (ref 36–65)
SEGMENTED NEUTROPHILS ABSOLUTE COUNT: 2.56 K/UL (ref 1.5–8.1)
SODIUM BLD-SCNC: 142 MMOL/L (ref 135–144)
TOTAL PROTEIN: 7.5 G/DL (ref 6.4–8.3)
TRIGL SERPL-MCNC: 115 MG/DL
TSH SERPL DL<=0.05 MIU/L-ACNC: 2.15 MIU/L (ref 0.3–5)
URIC ACID: 5.8 MG/DL (ref 2.4–5.7)
VLDLC SERPL CALC-MCNC: NORMAL MG/DL (ref 1–30)
WBC # BLD: 4.9 K/UL (ref 3.5–11.3)
WBC # BLD: ABNORMAL 10*3/UL

## 2020-02-11 PROCEDURE — 80061 LIPID PANEL: CPT

## 2020-02-11 PROCEDURE — 73562 X-RAY EXAM OF KNEE 3: CPT

## 2020-02-11 PROCEDURE — 84550 ASSAY OF BLOOD/URIC ACID: CPT

## 2020-02-11 PROCEDURE — 36415 COLL VENOUS BLD VENIPUNCTURE: CPT

## 2020-02-11 PROCEDURE — 82570 ASSAY OF URINE CREATININE: CPT

## 2020-02-11 PROCEDURE — 72100 X-RAY EXAM L-S SPINE 2/3 VWS: CPT

## 2020-02-11 PROCEDURE — 84443 ASSAY THYROID STIM HORMONE: CPT

## 2020-02-11 PROCEDURE — 82043 UR ALBUMIN QUANTITATIVE: CPT

## 2020-02-11 PROCEDURE — 86038 ANTINUCLEAR ANTIBODIES: CPT

## 2020-02-11 PROCEDURE — 83036 HEMOGLOBIN GLYCOSYLATED A1C: CPT

## 2020-02-11 PROCEDURE — 80053 COMPREHEN METABOLIC PANEL: CPT

## 2020-02-11 PROCEDURE — 82248 BILIRUBIN DIRECT: CPT

## 2020-02-11 PROCEDURE — 85025 COMPLETE CBC W/AUTO DIFF WBC: CPT

## 2020-02-12 LAB — ANTI-NUCLEAR ANTIBODY (ANA): POSITIVE

## 2020-03-18 ENCOUNTER — HOSPITAL ENCOUNTER (OUTPATIENT)
Dept: MAMMOGRAPHY | Age: 65
Discharge: HOME | End: 2020-03-20
Payer: COMMERCIAL

## 2020-03-18 PROCEDURE — 77063 BREAST TOMOSYNTHESIS BI: CPT

## 2020-03-25 ENCOUNTER — TELEPHONE (OUTPATIENT)
Dept: ORTHOPEDIC SURGERY | Age: 65
End: 2020-03-25

## 2020-03-25 NOTE — TELEPHONE ENCOUNTER
Patient called and stated she is having left leg and knee pain and numbness. She seen Dr. Can Naqvi in 2017 for right knee. She would like to see him for this current condition when possible. Please return her call. Thank you.

## 2020-03-31 ENCOUNTER — HOSPITAL ENCOUNTER (OUTPATIENT)
Age: 65
Discharge: HOME | End: 2020-03-31
Payer: COMMERCIAL

## 2020-03-31 ENCOUNTER — OFFICE VISIT (OUTPATIENT)
Dept: ORTHOPEDIC SURGERY | Age: 65
End: 2020-03-31
Payer: COMMERCIAL

## 2020-03-31 VITALS — WEIGHT: 266.1 LBS | BODY MASS INDEX: 45.43 KG/M2 | HEIGHT: 64 IN

## 2020-03-31 LAB
ALBUMIN SERPL-MCNC: 3.9 G/DL (ref 3.5–5.2)
ALBUMIN/GLOBULIN RATIO: 1 (ref 1–2.5)
ALP BLD-CCNC: 94 U/L (ref 35–104)
ALT SERPL-CCNC: 14 U/L (ref 5–33)
ANION GAP SERPL CALCULATED.3IONS-SCNC: 12 MMOL/L (ref 9–17)
AST SERPL-CCNC: 20 U/L
BILIRUB SERPL-MCNC: 0.25 MG/DL (ref 0.3–1.2)
BUN BLDV-MCNC: 9 MG/DL (ref 8–23)
BUN/CREAT BLD: ABNORMAL (ref 9–20)
CALCIUM SERPL-MCNC: 10.5 MG/DL (ref 8.6–10.4)
CHLORIDE BLD-SCNC: 101 MMOL/L (ref 98–107)
CO2: 24 MMOL/L (ref 20–31)
CREAT SERPL-MCNC: 0.77 MG/DL (ref 0.5–0.9)
GFR AFRICAN AMERICAN: >60 ML/MIN
GFR NON-AFRICAN AMERICAN: >60 ML/MIN
GFR SERPL CREATININE-BSD FRML MDRD: ABNORMAL ML/MIN/{1.73_M2}
GFR SERPL CREATININE-BSD FRML MDRD: ABNORMAL ML/MIN/{1.73_M2}
GLUCOSE BLD-MCNC: 105 MG/DL (ref 70–99)
PHOSPHORUS: 2.7 MG/DL (ref 2.6–4.5)
POTASSIUM SERPL-SCNC: 4.3 MMOL/L (ref 3.7–5.3)
PTH INTACT: 139.5 PG/ML (ref 15–65)
SODIUM BLD-SCNC: 137 MMOL/L (ref 135–144)
TOTAL PROTEIN: 7.8 G/DL (ref 6.4–8.3)
VITAMIN D 25-HYDROXY: 10.8 NG/ML (ref 30–100)

## 2020-03-31 PROCEDURE — G8484 FLU IMMUNIZE NO ADMIN: HCPCS | Performed by: ORTHOPAEDIC SURGERY

## 2020-03-31 PROCEDURE — 86038 ANTINUCLEAR ANTIBODIES: CPT

## 2020-03-31 PROCEDURE — G8419 CALC BMI OUT NRM PARAM NOF/U: HCPCS | Performed by: ORTHOPAEDIC SURGERY

## 2020-03-31 PROCEDURE — 99213 OFFICE O/P EST LOW 20 MIN: CPT | Performed by: ORTHOPAEDIC SURGERY

## 2020-03-31 PROCEDURE — 83970 ASSAY OF PARATHORMONE: CPT

## 2020-03-31 PROCEDURE — 36415 COLL VENOUS BLD VENIPUNCTURE: CPT

## 2020-03-31 PROCEDURE — G8427 DOCREV CUR MEDS BY ELIG CLIN: HCPCS | Performed by: ORTHOPAEDIC SURGERY

## 2020-03-31 PROCEDURE — 80053 COMPREHEN METABOLIC PANEL: CPT

## 2020-03-31 PROCEDURE — 84100 ASSAY OF PHOSPHORUS: CPT

## 2020-03-31 PROCEDURE — 82306 VITAMIN D 25 HYDROXY: CPT

## 2020-03-31 PROCEDURE — 3017F COLORECTAL CA SCREEN DOC REV: CPT | Performed by: ORTHOPAEDIC SURGERY

## 2020-03-31 PROCEDURE — 1036F TOBACCO NON-USER: CPT | Performed by: ORTHOPAEDIC SURGERY

## 2020-03-31 RX ORDER — TRAMADOL HYDROCHLORIDE 50 MG/1
50 TABLET ORAL EVERY 8 HOURS PRN
Qty: 21 TABLET | Refills: 0 | Status: SHIPPED | OUTPATIENT
Start: 2020-03-31 | End: 2020-04-11 | Stop reason: SDUPTHER

## 2020-04-01 LAB — ANTI-NUCLEAR ANTIBODY (ANA): POSITIVE

## 2020-04-11 RX ORDER — TRAMADOL HYDROCHLORIDE 50 MG/1
50 TABLET ORAL EVERY 8 HOURS PRN
Qty: 21 TABLET | Refills: 0 | Status: SHIPPED | OUTPATIENT
Start: 2020-04-11 | End: 2020-04-17

## 2020-04-17 RX ORDER — TRAMADOL HYDROCHLORIDE 50 MG/1
TABLET ORAL
Qty: 42 TABLET | Refills: 0 | Status: SHIPPED | OUTPATIENT
Start: 2020-04-17 | End: 2020-05-01

## 2020-04-30 ENCOUNTER — APPOINTMENT (OUTPATIENT)
Dept: GENERAL RADIOLOGY | Age: 65
End: 2020-04-30
Payer: COMMERCIAL

## 2020-04-30 ENCOUNTER — HOSPITAL ENCOUNTER (EMERGENCY)
Age: 65
Discharge: HOME | End: 2020-04-30
Attending: EMERGENCY MEDICINE
Payer: COMMERCIAL

## 2020-04-30 VITALS
BODY MASS INDEX: 41.64 KG/M2 | HEIGHT: 64 IN | RESPIRATION RATE: 20 BRPM | DIASTOLIC BLOOD PRESSURE: 60 MMHG | OXYGEN SATURATION: 98 % | WEIGHT: 243.9 LBS | SYSTOLIC BLOOD PRESSURE: 131 MMHG | TEMPERATURE: 99.9 F | HEART RATE: 104 BPM

## 2020-04-30 LAB
-: ABNORMAL
ABSOLUTE EOS #: 0 K/UL (ref 0–0.44)
ABSOLUTE IMMATURE GRANULOCYTE: 0.03 K/UL (ref 0–0.3)
ABSOLUTE LYMPH #: 1.44 K/UL (ref 1.1–3.7)
ABSOLUTE MONO #: 0.48 K/UL (ref 0.1–1.2)
ALBUMIN SERPL-MCNC: 3.8 G/DL (ref 3.5–5.2)
ALBUMIN/GLOBULIN RATIO: ABNORMAL (ref 1–2.5)
ALP BLD-CCNC: 68 U/L (ref 35–104)
ALT SERPL-CCNC: 21 U/L (ref 5–33)
AMORPHOUS: ABNORMAL
AMPHETAMINE SCREEN URINE: NEGATIVE
ANION GAP SERPL CALCULATED.3IONS-SCNC: 13 MMOL/L (ref 9–17)
AST SERPL-CCNC: 43 U/L
BACTERIA: ABNORMAL
BARBITURATE SCREEN URINE: NEGATIVE
BASOPHILS # BLD: 0 % (ref 0–2)
BASOPHILS ABSOLUTE: 0 K/UL (ref 0–0.2)
BENZODIAZEPINE SCREEN, URINE: NEGATIVE
BILIRUB SERPL-MCNC: 0.34 MG/DL (ref 0.3–1.2)
BILIRUBIN URINE: NEGATIVE
BUN BLDV-MCNC: 11 MG/DL (ref 8–23)
BUN/CREAT BLD: 13 (ref 9–20)
BUPRENORPHINE URINE: NORMAL
C-REACTIVE PROTEIN: 23.1 MG/L (ref 0–5)
CALCIUM SERPL-MCNC: 10.3 MG/DL (ref 8.6–10.4)
CANNABINOID SCREEN URINE: NEGATIVE
CASTS UA: ABNORMAL /LPF
CHLORIDE BLD-SCNC: 99 MMOL/L (ref 98–107)
CO2: 26 MMOL/L (ref 20–31)
COCAINE METABOLITE, URINE: NEGATIVE
COLOR: YELLOW
COMMENT UA: ABNORMAL
CREAT SERPL-MCNC: 0.85 MG/DL (ref 0.5–0.9)
CRYSTALS, UA: ABNORMAL /HPF
DIFFERENTIAL TYPE: ABNORMAL
EOSINOPHILS RELATIVE PERCENT: 0 % (ref 1–4)
EPITHELIAL CELLS UA: ABNORMAL /HPF (ref 0–5)
FERRITIN: 120 UG/L (ref 13–150)
GFR AFRICAN AMERICAN: >60 ML/MIN
GFR NON-AFRICAN AMERICAN: >60 ML/MIN
GFR SERPL CREATININE-BSD FRML MDRD: ABNORMAL ML/MIN/{1.73_M2}
GFR SERPL CREATININE-BSD FRML MDRD: ABNORMAL ML/MIN/{1.73_M2}
GLUCOSE BLD-MCNC: 104 MG/DL (ref 70–99)
GLUCOSE URINE: NEGATIVE
HCT VFR BLD CALC: 39.1 % (ref 36.3–47.1)
HEMOGLOBIN: 12.7 G/DL (ref 11.9–15.1)
IMMATURE GRANULOCYTES: 1 %
INR BLD: 1
KETONES, URINE: NEGATIVE
LACTATE DEHYDROGENASE: 285 U/L (ref 135–214)
LEUKOCYTE ESTERASE, URINE: NEGATIVE
LIPASE: 26 U/L (ref 13–60)
LYMPHOCYTES # BLD: 45 % (ref 24–43)
MCH RBC QN AUTO: 29.1 PG (ref 25.2–33.5)
MCHC RBC AUTO-ENTMCNC: 32.5 G/DL (ref 28.4–34.8)
MCV RBC AUTO: 89.5 FL (ref 82.6–102.9)
MDMA URINE: NORMAL
METHADONE SCREEN, URINE: NEGATIVE
METHAMPHETAMINE, URINE: NORMAL
MONOCYTES # BLD: 15 % (ref 3–12)
MUCUS: ABNORMAL
MYOGLOBIN: 215 NG/ML (ref 25–58)
NITRITE, URINE: NEGATIVE
NRBC AUTOMATED: ABNORMAL PER 100 WBC
OPIATES, URINE: NEGATIVE
OTHER OBSERVATIONS UA: ABNORMAL
OXYCODONE SCREEN URINE: NEGATIVE
PARTIAL THROMBOPLASTIN TIME: 28.3 SEC (ref 23.9–33.8)
PDW BLD-RTO: 13.7 % (ref 11.8–14.4)
PH UA: 7 (ref 5–8)
PHENCYCLIDINE, URINE: NEGATIVE
PLATELET # BLD: 366 K/UL (ref 138–453)
PLATELET ESTIMATE: ABNORMAL
PMV BLD AUTO: 10.5 FL (ref 8.1–13.5)
POTASSIUM SERPL-SCNC: 4 MMOL/L (ref 3.7–5.3)
PROPOXYPHENE, URINE: NORMAL
PROTEIN UA: NEGATIVE
PROTHROMBIN TIME: 13 SEC (ref 11.5–14.2)
RBC # BLD: 4.37 M/UL (ref 3.95–5.11)
RBC # BLD: ABNORMAL 10*6/UL
RBC UA: ABNORMAL /HPF (ref 0–2)
RENAL EPITHELIAL, UA: ABNORMAL /HPF
SEG NEUTROPHILS: 39 % (ref 36–65)
SEGMENTED NEUTROPHILS ABSOLUTE COUNT: 1.25 K/UL (ref 1.5–8.1)
SODIUM BLD-SCNC: 138 MMOL/L (ref 135–144)
SPECIFIC GRAVITY UA: 1.01 (ref 1–1.03)
TEST INFORMATION: NORMAL
TOTAL PROTEIN: 7.7 G/DL (ref 6.4–8.3)
TRICHOMONAS: ABNORMAL
TRICYCLIC ANTIDEPRESSANTS, UR: NORMAL
TROPONIN INTERP: ABNORMAL
TROPONIN T: ABNORMAL NG/ML
TROPONIN, HIGH SENSITIVITY: 11 NG/L (ref 0–14)
TURBIDITY: CLEAR
URINE HGB: NEGATIVE
UROBILINOGEN, URINE: ABNORMAL
WBC # BLD: 3.2 K/UL (ref 3.5–11.3)
WBC # BLD: ABNORMAL 10*3/UL
WBC UA: ABNORMAL /HPF (ref 0–5)
YEAST: ABNORMAL

## 2020-04-30 PROCEDURE — 96374 THER/PROPH/DIAG INJ IV PUSH: CPT

## 2020-04-30 PROCEDURE — 85730 THROMBOPLASTIN TIME PARTIAL: CPT

## 2020-04-30 PROCEDURE — 82728 ASSAY OF FERRITIN: CPT

## 2020-04-30 PROCEDURE — 80053 COMPREHEN METABOLIC PANEL: CPT

## 2020-04-30 PROCEDURE — 81001 URINALYSIS AUTO W/SCOPE: CPT

## 2020-04-30 PROCEDURE — 99284 EMERGENCY DEPT VISIT MOD MDM: CPT

## 2020-04-30 PROCEDURE — 80307 DRUG TEST PRSMV CHEM ANLYZR: CPT

## 2020-04-30 PROCEDURE — 71045 X-RAY EXAM CHEST 1 VIEW: CPT

## 2020-04-30 PROCEDURE — 84484 ASSAY OF TROPONIN QUANT: CPT

## 2020-04-30 PROCEDURE — C9113 INJ PANTOPRAZOLE SODIUM, VIA: HCPCS | Performed by: PHYSICIAN ASSISTANT

## 2020-04-30 PROCEDURE — 85025 COMPLETE CBC W/AUTO DIFF WBC: CPT

## 2020-04-30 PROCEDURE — 2580000003 HC RX 258: Performed by: PHYSICIAN ASSISTANT

## 2020-04-30 PROCEDURE — 85610 PROTHROMBIN TIME: CPT

## 2020-04-30 PROCEDURE — 6370000000 HC RX 637 (ALT 250 FOR IP): Performed by: PHYSICIAN ASSISTANT

## 2020-04-30 PROCEDURE — 87086 URINE CULTURE/COLONY COUNT: CPT

## 2020-04-30 PROCEDURE — 83874 ASSAY OF MYOGLOBIN: CPT

## 2020-04-30 PROCEDURE — 83690 ASSAY OF LIPASE: CPT

## 2020-04-30 PROCEDURE — 96375 TX/PRO/DX INJ NEW DRUG ADDON: CPT

## 2020-04-30 PROCEDURE — 86140 C-REACTIVE PROTEIN: CPT

## 2020-04-30 PROCEDURE — 83615 LACTATE (LD) (LDH) ENZYME: CPT

## 2020-04-30 PROCEDURE — 6360000002 HC RX W HCPCS: Performed by: PHYSICIAN ASSISTANT

## 2020-04-30 PROCEDURE — 93005 ELECTROCARDIOGRAM TRACING: CPT | Performed by: PHYSICIAN ASSISTANT

## 2020-04-30 RX ORDER — ONDANSETRON 4 MG/1
4 TABLET, ORALLY DISINTEGRATING ORAL EVERY 8 HOURS PRN
Qty: 20 TABLET | Refills: 0 | Status: SHIPPED | OUTPATIENT
Start: 2020-04-30 | End: 2020-09-16

## 2020-04-30 RX ORDER — PANTOPRAZOLE SODIUM 40 MG/10ML
40 INJECTION, POWDER, LYOPHILIZED, FOR SOLUTION INTRAVENOUS ONCE
Status: COMPLETED | OUTPATIENT
Start: 2020-04-30 | End: 2020-04-30

## 2020-04-30 RX ORDER — ACETAMINOPHEN 500 MG
1000 TABLET ORAL ONCE
Status: COMPLETED | OUTPATIENT
Start: 2020-04-30 | End: 2020-04-30

## 2020-04-30 RX ORDER — AZITHROMYCIN 250 MG/1
TABLET, FILM COATED ORAL
Qty: 1 PACKET | Refills: 0 | Status: SHIPPED | OUTPATIENT
Start: 2020-04-30 | End: 2020-05-10

## 2020-04-30 RX ORDER — 0.9 % SODIUM CHLORIDE 0.9 %
1000 INTRAVENOUS SOLUTION INTRAVENOUS ONCE
Status: COMPLETED | OUTPATIENT
Start: 2020-04-30 | End: 2020-04-30

## 2020-04-30 RX ORDER — ONDANSETRON 2 MG/ML
4 INJECTION INTRAMUSCULAR; INTRAVENOUS ONCE
Status: COMPLETED | OUTPATIENT
Start: 2020-04-30 | End: 2020-04-30

## 2020-04-30 RX ORDER — SODIUM CHLORIDE 9 MG/ML
10 INJECTION INTRAVENOUS ONCE
Status: COMPLETED | OUTPATIENT
Start: 2020-04-30 | End: 2020-04-30

## 2020-04-30 RX ADMIN — ACETAMINOPHEN 1000 MG: 500 TABLET ORAL at 19:57

## 2020-04-30 RX ADMIN — PANTOPRAZOLE SODIUM 40 MG: 40 INJECTION, POWDER, FOR SOLUTION INTRAVENOUS at 19:57

## 2020-04-30 RX ADMIN — Medication 10 ML: at 19:58

## 2020-04-30 RX ADMIN — SODIUM CHLORIDE 1000 ML: 9 INJECTION, SOLUTION INTRAVENOUS at 19:56

## 2020-04-30 RX ADMIN — ONDANSETRON 4 MG: 2 INJECTION INTRAMUSCULAR; INTRAVENOUS at 19:57

## 2020-04-30 ASSESSMENT — PAIN SCALES - GENERAL
PAINLEVEL_OUTOF10: 8
PAINLEVEL_OUTOF10: 8

## 2020-04-30 ASSESSMENT — PAIN DESCRIPTION - LOCATION: LOCATION: BACK;LEG;ABDOMEN

## 2020-04-30 ASSESSMENT — PAIN DESCRIPTION - PAIN TYPE: TYPE: CHRONIC PAIN

## 2020-04-30 ASSESSMENT — PAIN DESCRIPTION - FREQUENCY: FREQUENCY: CONTINUOUS

## 2020-04-30 ASSESSMENT — PAIN DESCRIPTION - DESCRIPTORS: DESCRIPTORS: RADIATING

## 2020-04-30 NOTE — ED PROVIDER NOTES
[facility] ED  [facility]      Pt Name: Abbi Warren  MRN: [id number]  [place] 1955  Date ofevaluation: 4/30/2020  Provider: Justin Alfred Dr       Chief Complaint   Patient presents with    Abdominal Pain    Back Pain    Nausea         HISTORY OF PRESENT ILLNESS  (Location/Symptom, Timing/Onset, Context/Setting, Quality, Duration, Modifying Factors, Severity.)   Abbi Warren is a 62 y.o. female who presents to the emergency department with nausea for the last 4 days. Reports diarrhea for the last 2. As result patient feels very weak, fatigued and has no energy. Reports a slight cough as well. Patient has been unable to sleep. And states she has been possibly hallucinating at night due to lack of sleep. Denies any chest pain or shortness of breath. Nursing Notes were reviewed. ALLERGIES     Patient has no known allergies. CURRENT MEDICATIONS       Discharge Medication List as of 4/30/2020  9:08 PM      CONTINUE these medications which have NOT CHANGED    Details   traMADol (ULTRAM) 50 MG tablet TAKE 1 TABLET BY MOUTH EVERY 8 HOURS AS NEEDED FOR PAIN FOR UP TO 7 DAYS, Disp-42 tablet, R-0Normal      ibuprofen (ADVIL;MOTRIN) 800 MG tablet Take 1 tablet by mouth every 8 hours as needed for Pain, Disp-120 tablet, R-3Normal      metFORMIN (GLUCOPHAGE) 500 MG tablet Take 1 tablet by mouth daily (with breakfast), Disp-30 tablet, R-0Normal      Omega-3 Fatty Acids (OMEGA 3 PO) Take by mouth daily Historical Med             PAST MEDICAL HISTORY         Diagnosis Date    Morbid obesity ([address] 4/17/2015       SURGICAL HISTORY           Procedure Laterality Date    CHOLECYSTECTOMY  1/2000         FAMILY HISTORY           Problem Relation Age of Onset    Stroke Father      Family Status   Relation Name Status    Father  (Not Specified)        SOCIAL HISTORY      reports that she has quit smoking. She started smoking about 21 years ago.  She has

## 2020-05-01 ENCOUNTER — TELEPHONE (OUTPATIENT)
Dept: ORTHOPEDIC SURGERY | Age: 65
End: 2020-05-01

## 2020-05-01 ENCOUNTER — CARE COORDINATION (OUTPATIENT)
Dept: CARE COORDINATION | Age: 65
End: 2020-05-01

## 2020-05-01 LAB
CULTURE: NORMAL
EKG ATRIAL RATE: 91 BPM
EKG P AXIS: 54 DEGREES
EKG P-R INTERVAL: 134 MS
EKG Q-T INTERVAL: 360 MS
EKG QRS DURATION: 80 MS
EKG QTC CALCULATION (BAZETT): 442 MS
EKG R AXIS: 6 DEGREES
EKG T AXIS: 44 DEGREES
EKG VENTRICULAR RATE: 91 BPM
Lab: NORMAL
SPECIMEN DESCRIPTION: NORMAL

## 2020-05-01 PROCEDURE — 93010 ELECTROCARDIOGRAM REPORT: CPT | Performed by: INTERNAL MEDICINE

## 2020-05-14 ENCOUNTER — INITIAL CONSULT (OUTPATIENT)
Dept: PHYSICAL MEDICINE AND REHAB | Age: 65
End: 2020-05-14
Payer: COMMERCIAL

## 2020-05-14 VITALS — BODY MASS INDEX: 40.55 KG/M2 | WEIGHT: 243.4 LBS | TEMPERATURE: 98.5 F | HEIGHT: 65 IN

## 2020-05-14 PROCEDURE — 3017F COLORECTAL CA SCREEN DOC REV: CPT | Performed by: PHYSICAL MEDICINE & REHABILITATION

## 2020-05-14 PROCEDURE — 99204 OFFICE O/P NEW MOD 45 MIN: CPT | Performed by: PHYSICAL MEDICINE & REHABILITATION

## 2020-05-14 PROCEDURE — 1036F TOBACCO NON-USER: CPT | Performed by: PHYSICAL MEDICINE & REHABILITATION

## 2020-05-14 PROCEDURE — G8427 DOCREV CUR MEDS BY ELIG CLIN: HCPCS | Performed by: PHYSICAL MEDICINE & REHABILITATION

## 2020-05-14 PROCEDURE — G8417 CALC BMI ABV UP PARAM F/U: HCPCS | Performed by: PHYSICAL MEDICINE & REHABILITATION

## 2020-05-14 RX ORDER — ACETAMINOPHEN 325 MG/1
650 TABLET ORAL EVERY 6 HOURS PRN
COMMUNITY

## 2020-05-14 RX ORDER — ERGOCALCIFEROL 1.25 MG/1
50000 CAPSULE ORAL WEEKLY
COMMUNITY

## 2020-05-14 RX ORDER — GABAPENTIN 300 MG/1
300 CAPSULE ORAL 3 TIMES DAILY
Qty: 90 CAPSULE | Refills: 3 | Status: SHIPPED | OUTPATIENT
Start: 2020-05-14 | End: 2020-07-08 | Stop reason: ALTCHOICE

## 2020-05-14 NOTE — PROGRESS NOTES
Known Allergies    Subjective:     Review of Systems  Constitutional: Negative for fever, chills and unexpected weight change. HEENT: Negative for trouble swallowing. No acute vision changes. Respiratory: Negative for cough and shortness of breath. Cardiovascular: Negative for chest pain. Endocrine: Negative for polyuria. Genitourinary: Negative for dysuria, urgency,frequency and difficulty urinating. Musculoskeletal: Positive for stiff joints. Neurological: Negative for headaches. Dermatologic: Negative rashes, ulcers, or lesions. Psychiatric: Negative for depressed mood or anxiety. Objective:     Physical Exam  Temp 98.5 °F (36.9 °C) (Tympanic)   Ht 5' 4.5\" (1.638 m)   Wt 243 lb 6.4 oz (110.4 kg)   BMI 41.13 kg/m²   Constitutional: She is in no distress. She appears well-developed and well-nourished. HEENT:  Normocephalic. EOMI. PERRL. Mucous membranes pink and moist.   Pulmonary/Chest: Respirations WNL and unlabored. Neurological: She is alert and oriented to person, place, and time. She has DTRs 2+. Sensation intact to light touch. SLR is negative bilaterally. Skin: Skin is warm, dry and intact with good turgor. Psychiatric: She has a normal mood and affect.  Her behavior is normal.Thought content normal.   MSK:   Tenderness:      Back Lumbosacral     Greater Trochanter Negative       Range of Motion:      Back Flexion: Normal     Lumbar Rotation Normal     Lumbar Lateral Bend Normal     Back Extension: Normal       Tests      Bradley's:            Right negative                             Left negative     Slump:            Right negative                             Left negative     SLR:                Right Negative                             Left Negative   Facet Loading:  Right diminished range with pain                             Left diminished range with pain       Muscle Strength RIGHT     Abductor: 5/5     Adductor: 5/5     Quadriceps: 5/5     Hamstrings: 5/5 daily for 120 days. Intended supply: 30 days     Dispense:  90 capsule     Refill:  3     1 at hs x 1 wk, then 1 BID x 1 wk, then 1 TID    Misc. Devices MISC     Si each by Does not apply route once as needed (self directed muscle massage) Please dispense one theracane device. Massage back as tolerated to relieve muscle spasms. Dispense:  1 Device     Refill:  0     Return in about 8 weeks (around 2020). Electronically signed by Felisa Miles MD on 2020 at 10:21 PM.     Please note that this chart was generated using voicerecognition Dragon dictation software. Although every effort was made to ensurethe accuracy of this automated transcription, some errors in transcription may haveoccurred.

## 2020-07-08 ENCOUNTER — OFFICE VISIT (OUTPATIENT)
Dept: PHYSICAL MEDICINE AND REHAB | Age: 65
End: 2020-07-08
Payer: COMMERCIAL

## 2020-07-08 VITALS
TEMPERATURE: 97 F | SYSTOLIC BLOOD PRESSURE: 132 MMHG | WEIGHT: 255.2 LBS | DIASTOLIC BLOOD PRESSURE: 77 MMHG | HEIGHT: 65 IN | HEART RATE: 106 BPM | BODY MASS INDEX: 42.52 KG/M2

## 2020-07-08 PROCEDURE — G8427 DOCREV CUR MEDS BY ELIG CLIN: HCPCS | Performed by: PHYSICAL MEDICINE & REHABILITATION

## 2020-07-08 PROCEDURE — G8417 CALC BMI ABV UP PARAM F/U: HCPCS | Performed by: PHYSICAL MEDICINE & REHABILITATION

## 2020-07-08 PROCEDURE — 1036F TOBACCO NON-USER: CPT | Performed by: PHYSICAL MEDICINE & REHABILITATION

## 2020-07-08 PROCEDURE — 3017F COLORECTAL CA SCREEN DOC REV: CPT | Performed by: PHYSICAL MEDICINE & REHABILITATION

## 2020-07-08 PROCEDURE — 99213 OFFICE O/P EST LOW 20 MIN: CPT | Performed by: PHYSICAL MEDICINE & REHABILITATION

## 2020-07-08 RX ORDER — DULOXETIN HYDROCHLORIDE 30 MG/1
60 CAPSULE, DELAYED RELEASE ORAL DAILY
Qty: 60 CAPSULE | Refills: 3 | Status: SHIPPED | OUTPATIENT
Start: 2020-07-08 | End: 2020-09-16 | Stop reason: SINTOL

## 2020-07-08 NOTE — PROGRESS NOTES
[facility]  [facility] PHYSICAL MEDICINE AND REHABILITATION  [address] [address]  Dept: [phone]  Dept Fax: [phone]    Outpatient Followup Note    Filipe Barriga, 62 y.o., female, presents for follow up c/o of Follow-up  . HPI:     HPI  Patient returns for chronic low back pain radiating to the L medial leg. She notes that gabapentin has helped alleviate the neuropathic pain, however she is gaining weight and is asking for an alternative medication. She continues to have associated stiffness and muscle tightness, worse in the medial L thigh. She has not obtained a theracane yet. She reports she attended one visit at [facility] therapy but co-pay was too expensive. She has found an outpatient therapy provider with a lower co-pay and would like to go there.      Past Medical History:   Diagnosis Date    Morbid obesity ([address] 2015      Past Surgical History:   Procedure Laterality Date    CHOLECYSTECTOMY  2000     Family History   Problem Relation Age of Onset    Stroke Father      Social History     Socioeconomic History    Marital status: Single     Spouse name: None    Number of children: None    Years of education: None    Highest education level: None   Occupational History    None   Social Needs    Financial resource strain: None    Food insecurity     Worry: None     Inability: None    Transportation needs     Medical: None     Non-medical: None   Tobacco Use    Smoking status: Former Smoker     Types: Cigarettes     Start date: 1990     Last attempt to quit: 1999     Years since quittin.5    Smokeless tobacco: Never Used   Substance and Sexual Activity    Alcohol use: No    Drug use: No    Sexual activity: Never   Lifestyle    Physical activity     Days per week: None     Minutes per session: None    Stress: None   Relationships    Social connections     Talks on phone: None     Gets together: None     Attends [employer] service: for constipation or diarrhea. Musculoskeletal: Positive for arthralgias. Neurological: Negative for headaches, numbness, or tingling. Psychiatric: Negative for depressed mood or anxiety. Objective:     Physical Exam  /77   Pulse 106   Temp 97 °F (36.1 °C) (Temporal)   Ht 5' 4.6\" (1.641 m)   Wt 255 lb 3.2 oz (115.8 kg)   BMI 43.00 kg/m²   Constitutional: She is oriented to person, place,and time. She appears well-developed and well-nourished. HEENT: NCAT, PERRL, EOMI. Mucous membranes pink and moist.  Pulmonary/Chest: Respirations WNL and unlabored. Neurological: She is alert and oriented to person, place, and time. She has normal reflexes. DTRs 1+ and symmetric. Positive SLR on L, negative on the R leg. MSK: Functional ROM lumbar spine in all planes. Strength 5/5 key muscles RLE including hip flexion, knee extension, dorsiflexion, plantar flexion and EHL. Strength in key muscles L leg is 4/5 in hip flexion, knee extension 4+/5, L dorsiflexion, plantar flexion and EHL are 5/5. Skin: Skin is warm dry and intact with good turgor. Psychiatric: She has a normal mood and affect. Her behavior is normal. Thought content normal.   Nursing note and vitals reviewed. Imaging: None new    Assessment:       Diagnosis Orders   1. Radiculopathy, lumbosacral region  External Referral To Physical Therapy    MRI Lumbar Spine WO Contrast        Plan:      No significant improvement in symptoms despite home exercises and gabapentin. Therapy at [facility] as requested by patient. Educated her that key long-term back pain management is core strengthening. MRI. Adverse effects from gabapentin - weight gain. Start trial of Cymbalta - start 30 mg/ week x 1 week, then increase to 60 mg.    Orders Placed This Encounter   Procedures    MRI Lumbar Spine WO Contrast     Standing Status:   Future     Standing Expiration Date:   7/8/2021     Order Specific Question:   Reason for exam:     Answer:   pain in low back radiating to L leg in L2-3 distribution    External Referral To Physical Therapy     Referral Priority:   Routine     Referral Type:   Eval and Treat     Referral Reason:   Specialty Services Required     Requested Specialty:   Physical Therapy     Number of Visits Requested:   1     Orders Placed This Encounter   Medications    DULoxetine (CYMBALTA) 30 MG extended release capsule     Sig: Take 2 capsules by mouth daily Take 30 mg daily for first week, then increase to 60 mg daily     Dispense:  60 capsule     Refill:  3     Return in about 8 weeks (around 9/2/2020). Electronically signedby Pricila Espinoza MD on 7/8/2020 at 2:43 PM.     Please note that this chartwas generated using voice recognition Dragon dictation software. Although everyeffort was made to ensure the accuracy of this automated transcription, some errorsin transcription may have occurred.

## 2020-07-10 ENCOUNTER — TELEPHONE (OUTPATIENT)
Dept: PHYSICAL MEDICINE AND REHAB | Age: 65
End: 2020-07-10

## 2020-07-10 NOTE — TELEPHONE ENCOUNTER
We were trying the gabapentin initially but she was concerned about weight gain. She can stop taking the Cymbalta. Up to her if she feels the gabapentin is worth the potential side effect of the weight gain.

## 2020-08-03 ENCOUNTER — CLINICAL DOCUMENTATION (OUTPATIENT)
Dept: PHYSICAL MEDICINE AND REHAB | Age: 65
End: 2020-08-03

## 2020-08-03 NOTE — PROGRESS NOTES
I spoke with patient about mri testing.   She states that she canc this appt and wants to see neurosurgeon first before rs this appt

## 2020-08-14 RX ORDER — GABAPENTIN 300 MG/1
CAPSULE ORAL
Qty: 90 CAPSULE | Refills: 3 | OUTPATIENT
Start: 2020-08-14

## 2020-08-24 ENCOUNTER — TELEPHONE (OUTPATIENT)
Dept: PHYSICAL MEDICINE AND REHAB | Age: 65
End: 2020-08-24

## 2020-08-24 NOTE — TELEPHONE ENCOUNTER
I had to redo the PT order for this patient. It has . She wants this faxed to [facility] at [facility] [phone]    I re-entered and copied from the  order.

## 2020-08-24 NOTE — TELEPHONE ENCOUNTER
I did not see any clinical indication to order an MRI of the leg.  If that changes I'll order one but she only needs the spine MRI based on my exam.

## 2020-08-24 NOTE — TELEPHONE ENCOUNTER
Pt was notified that at this time to complete just the low back mri.   Can re discuss at appt or if mri of low back is abnormal

## 2020-08-24 NOTE — TELEPHONE ENCOUNTER
Pt called to request you order an MRI of the left leg. Stating that she is going to make appt for the low back MRI that was ordered but wants leg also. ( that is a separate approval I will have to work on ). Also, she has not done any PT yet because of finances. dont know if insurance will approve the leg or not without any PT completed first.  Tried to explain that the nerves and muscle stem form the low back and if there are symptoms in the legs sometimes problems in the low back are the cause and that is probably why you did not order leg also but she is still wanting leg tested also. She has appt with you on 09.16.20 at 2 and neurosurg. appt on 09.16.20 at 3. (one of these needs to be moved).

## 2020-09-09 ENCOUNTER — TELEPHONE (OUTPATIENT)
Dept: PHYSICAL MEDICINE AND REHAB | Age: 65
End: 2020-09-09

## 2020-09-09 NOTE — TELEPHONE ENCOUNTER
Pt called due to appt conflict on 23.40.32 with Dr. Kevin Anthony. I cancelled dr. Nik Matias appt and informed patient to call us after NS appt so that Dr. Nik Matias can be aware of the NS plan and possibly squeeze her in, if needed, for a phone visit. She did complete MRI of the lumbar spine but NS will go over the results and she is currently doing PT with [facility].

## 2020-09-10 ENCOUNTER — HOSPITAL ENCOUNTER (OUTPATIENT)
Dept: MRI IMAGING | Age: 65
Discharge: HOME | End: 2020-09-12
Payer: COMMERCIAL

## 2020-09-10 PROCEDURE — 72148 MRI LUMBAR SPINE W/O DYE: CPT

## 2020-09-16 ENCOUNTER — OFFICE VISIT (OUTPATIENT)
Dept: NEUROSURGERY | Age: 65
End: 2020-09-16
Payer: COMMERCIAL

## 2020-09-16 VITALS
DIASTOLIC BLOOD PRESSURE: 78 MMHG | TEMPERATURE: 96.1 F | SYSTOLIC BLOOD PRESSURE: 136 MMHG | RESPIRATION RATE: 16 BRPM | HEART RATE: 106 BPM

## 2020-09-16 PROCEDURE — 1036F TOBACCO NON-USER: CPT | Performed by: NEUROLOGICAL SURGERY

## 2020-09-16 PROCEDURE — G8427 DOCREV CUR MEDS BY ELIG CLIN: HCPCS | Performed by: NEUROLOGICAL SURGERY

## 2020-09-16 PROCEDURE — G8417 CALC BMI ABV UP PARAM F/U: HCPCS | Performed by: NEUROLOGICAL SURGERY

## 2020-09-16 PROCEDURE — 3017F COLORECTAL CA SCREEN DOC REV: CPT | Performed by: NEUROLOGICAL SURGERY

## 2020-09-16 PROCEDURE — 99203 OFFICE O/P NEW LOW 30 MIN: CPT | Performed by: NEUROLOGICAL SURGERY

## 2020-09-16 RX ORDER — GABAPENTIN 300 MG/1
300 CAPSULE ORAL 3 TIMES DAILY
COMMUNITY
End: 2020-09-21 | Stop reason: SDUPTHER

## 2020-09-16 NOTE — PROGRESS NOTES
Nargis Mccarty  [facility] [address]  [address]  Dept: [phone]    Patient:  Marian Zamora  YOB: 1955  Date: 9/16/20    The patient is a 62 y.o. female who presents today for consult of the following problems:     Chief Complaint   Patient presents with    New Patient     Spondylosis with radiculopathy lumbar             HPI:     Marian Zamora is a 62 y.o. female on whom neurosurgical consultation was requested by Lexy Pulido MD for management of lumbar stenosis with claudication and radiculopathy. The patient states that since November of last year without any inciting event she has been having axial spasmodic left-sided paraspinal axial back pain radiating into the left lower extremity approximately 4 dermatome posterior aspect of the hamstring and anterior medial aspect of the lower leg and into the plantar surface of the foot. Pain exacerbated when she is upright or when she is on her knees and it actually improves when she is in a seated position. No significant palliating factor although she did have 3 weeks of therapy thus far been no improvement. She is not had any type of injections thus far but pain is been progressive with worsening over time. 80% is approximately lower extremity versus 20% back pain. Otherwise no prior history of remote therapy. No other right leg symptoms saddle anesthesia bowel bladder incontinence. Sona Simpler History:     Past Medical History:   Diagnosis Date    Morbid obesity ([address] 4/17/2015     Past Surgical History:   Procedure Laterality Date    CHOLECYSTECTOMY  1/2000     Family History   Problem Relation Age of Onset    Stroke Father      Current Outpatient Medications on File Prior to Visit   Medication Sig Dispense Refill    gabapentin (NEURONTIN) 300 MG capsule Take 300 mg by mouth 3 times daily.       vitamin D (ERGOCALCIFEROL) 1.25 MG (07990 UT) CAPS capsule Take 50,000 Units by mouth once a week      acetaminophen (TYLENOL) 325 MG tablet Take 650 mg by mouth every 6 hours as needed for Pain      Omega-3 Fatty Acids (OMEGA 3 PO) Take by mouth daily       Misc. Devices MISC 1 each by Does not apply route once as needed (self directed muscle massage) Please dispense one theracane device. Massage back as tolerated to relieve muscle spasms. 1 Device 0    ibuprofen (ADVIL;MOTRIN) 800 MG tablet Take 1 tablet by mouth every 8 hours as needed for Pain (Patient not taking: Reported on 2020) 120 tablet 3     No current facility-administered medications on file prior to visit. Social History     Tobacco Use    Smoking status: Former Smoker     Types: Cigarettes     Start date: 1990     Last attempt to quit: 1999     Years since quittin.7    Smokeless tobacco: Never Used   Substance Use Topics    Alcohol use: No    Drug use: No       No Known Allergies    Review of Systems  Constitutional: Negative for activity change and appetite change. HENT: Negative for ear pain and facial swelling. Eyes: Negative for discharge and itching. Respiratory: Negative for choking and chest tightness. Cardiovascular: Negative for chest pain and leg swelling. Gastrointestinal: Negative for nausea and abdominal pain. Endocrine: Negative for cold intolerance and heat intolerance. Genitourinary: Negative for frequency and flank pain. Musculoskeletal: Negative for myalgias and joint swelling. Skin: Negative for rash and wound. Allergic/Immunologic: Negative for environmental allergies and food allergies. Hematological: Negative for adenopathy. Does not bruise/bleed easily. Psychiatric/Behavioral: Negative for self-injury. The patient is not nervous/anxious.       Physical Exam:      /78 (Site: Left Upper Arm, Position: Sitting, Cuff Size: Large Adult)   Pulse 106   Temp 96.1 °F (35.6 °C)   Resp 16   Estimated body mass index is 43 kg/m² as calculated from the following:    Height as of 7/8/20: 5' 4.6\" (1.641 m). Weight as of 7/8/20: 255 lb 3.2 oz (115.8 kg). General:  Govind Salinas is a 62y.o. year old female who appears her stated age. HEENT: Normocephalic atraumatic. Neck supple. Chest: regular rate; pulses equal  Abdomen: Soft nontender nondistended. Normoactive bowel sounds. Ext: DP and PT pulses 2+, good cap refill  Neuro    Mentation  Appropriate affect  Registration intact  Orientation intact  3 item recall intact  Judgement intact to situation    Cranial Nerves:   Pupils equal and reactive to light  Extraocular motion intact  Face and shrug symmetric  Tongue midline  No dysarthria  v1-3 sensation symmetric, masseter tone symmetric  Hearing symmetric and intact to finger rub    Sensation:   intact    Motor  L deltoid 5/5; R deltoid 5/5  L biceps 5/5; R biceps 5/5  L triceps 5/5; R triceps 5/5  L wrist extension 5/5; R wrist extension 5/5  L intrinsics 5/5; R intrinsics 5/5     L iliopsoas 5/5 , R iliopsoas 5/5  L quadriceps 5/5; R quadriceps 5/5  L Dorsiflexion 5/5; R dorsiflexion 5/5  L Plantarflexion 5/5; R plantarflexion 5/5  L EHL 5/5; R EHL 5/5    Reflexes  L Brachioradialis 2+/4; R brachioradialis 2+/4  L Biceps 2+/4; R Biceps 2+/4  L Triceps 2+/4; R Triceps 2+/4  L Patellar 2+/4: R Patellar 2+/4  L Achilles 2+/4; R Achilles 2+/4    hoffmans L: neg  hoffmans R: neg  Clonus L: neg  Clonus R: neg  Babinski L: up  Babinski R; up    Neg straight leg raise, no es, no TTP over paraspinal and no SI joint ttp  Studies Review:     Mri multilevel spondylosis/stenosis worse at L3/4 and L2/3    Assessment and Plan:      1. Spinal stenosis of lumbar region with radiculopathy          Plan: Patient would like to hold off on surgical intervention at this time. I did discuss with her the options including physical therapy along with possibly oral steroids, epidural injection, surgical intervention for laminectomy at L2-3 and L3-4.   She would like to hold off

## 2020-09-21 RX ORDER — GABAPENTIN 300 MG/1
300 CAPSULE ORAL 3 TIMES DAILY
Qty: 90 CAPSULE | Refills: 3 | Status: SHIPPED | OUTPATIENT
Start: 2020-09-21 | End: 2021-02-11 | Stop reason: ALTCHOICE

## 2020-09-21 NOTE — TELEPHONE ENCOUNTER
Archie Nurse called requesting a refill of Gabapentin 300 MG. This was last filled on 8/13/2020. She was last seen on 7/8/2020. She does not have an appointment scheduled with you at this time.

## 2020-10-02 ENCOUNTER — TELEPHONE (OUTPATIENT)
Dept: NEUROSURGERY | Age: 65
End: 2020-10-02

## 2020-11-03 ENCOUNTER — HOSPITAL ENCOUNTER (OUTPATIENT)
Age: 65
Setting detail: SPECIMEN
Discharge: HOME | End: 2020-11-03
Payer: COMMERCIAL

## 2020-11-03 LAB
CALCIUM TIMED UR: NORMAL MG/X H
CALCIUM URINE: 10.8 MG/DL
CALCIUM, URINE: 178 MG/24 H (ref 20–275)
CREATININE TIMED UR: NORMAL MG/ X H
CREATININE URINE: 93 MG/DL
CREATININE, 24H UR: 1533 MG/24 H (ref 740–1570)
HOURS COLLECTED: 24 H
HOURS COLLECTED: 24 H
VOLUME: 1648 ML
VOLUME: 1648 ML

## 2020-11-05 ENCOUNTER — INITIAL CONSULT (OUTPATIENT)
Dept: PAIN MANAGEMENT | Age: 65
End: 2020-11-05
Payer: COMMERCIAL

## 2020-11-05 VITALS
BODY MASS INDEX: 43.87 KG/M2 | DIASTOLIC BLOOD PRESSURE: 72 MMHG | HEART RATE: 98 BPM | HEIGHT: 64 IN | TEMPERATURE: 97.2 F | SYSTOLIC BLOOD PRESSURE: 137 MMHG | WEIGHT: 257 LBS

## 2020-11-05 PROCEDURE — 99203 OFFICE O/P NEW LOW 30 MIN: CPT | Performed by: PAIN MEDICINE

## 2020-11-05 ASSESSMENT — ENCOUNTER SYMPTOMS
GASTROINTESTINAL NEGATIVE: 1
EYES NEGATIVE: 1
RESPIRATORY NEGATIVE: 1
ALLERGIC/IMMUNOLOGIC NEGATIVE: 1
BACK PAIN: 1
BOWEL INCONTINENCE: 0

## 2020-11-05 NOTE — PROGRESS NOTES
HPI:     Back Pain   This is a new problem. The current episode started more than 1 month ago. The problem occurs constantly. The problem has been gradually worsening since onset. The pain is present in the lumbar spine and gluteal. The quality of the pain is described as shooting and stabbing. The pain radiates to the left knee, left thigh and left foot. The pain is at a severity of 7/10. The pain is moderate. The pain is worse during the night. The symptoms are aggravated by bending, twisting, stress and standing. Stiffness is present in the morning. Associated symptoms include numbness and tingling. Pertinent negatives include no bladder incontinence, bowel incontinence or weakness. She has tried heat and NSAIDs for the symptoms. The treatment provided no relief. Pain in the low back that radiates down the leg. MRI with multilevel degenerative changes and multilevel stenosis. Pain has been getting worse lately. She has seen a neurosurgeon however she does not wish to consider surgery. She is in physical therapy without benefit. NSAIDs without benefit. Neurontin with mild benefit. Patient denies any new neurological symptoms. No bowel or bladder incontinence, no weakness, and no falling. Review of OARRS does not show any aberrant prescription behavior. Past Medical History:   Diagnosis Date    Morbid obesity ([facility] [address] 4/17/2015       Past Surgical History:   Procedure Laterality Date    CHOLECYSTECTOMY  1/2000       No Known Allergies      Current Outpatient Medications:     gabapentin (NEURONTIN) 300 MG capsule, Take 1 capsule by mouth 3 times daily for 120 days. , Disp: 90 capsule, Rfl: 3    acetaminophen (TYLENOL) 325 MG tablet, Take 650 mg by mouth every 6 hours as needed for Pain, Disp: , Rfl:     Omega-3 Fatty Acids (OMEGA 3 PO), Take by mouth daily , Disp: , Rfl:     vitamin D (ERGOCALCIFEROL) 1.25 MG (17072 UT) CAPS capsule, Take 50,000 Units by mouth once a week, Disp: , Rfl:    Misc. Devices MISC, 1 each by Does not apply route once as needed (self directed muscle massage) Please dispense one theracane device. Massage back as tolerated to relieve muscle spasms. , Disp: 1 Device, Rfl: 0    ibuprofen (ADVIL;MOTRIN) 800 MG tablet, Take 1 tablet by mouth every 8 hours as needed for Pain (Patient not taking: Reported on 2020), Disp: 120 tablet, Rfl: 3    Family History   Problem Relation Age of Onset    Stroke Father        Social History     Socioeconomic History    Marital status: Single     Spouse name: Not on file    Number of children: Not on file    Years of education: Not on file    Highest education level: Not on file   Occupational History    Not on file   Social Needs    Financial resource strain: Not on file    Food insecurity     Worry: Not on file     Inability: Not on file    Transportation needs     Medical: Not on file     Non-medical: Not on file   Tobacco Use    Smoking status: Former Smoker     Types: Cigarettes     Start date: 1990     Last attempt to quit: 1999     Years since quittin.8    Smokeless tobacco: Never Used   Substance and Sexual Activity    Alcohol use: No    Drug use: No    Sexual activity: Never   Lifestyle    Physical activity     Days per week: Not on file     Minutes per session: Not on file    Stress: Not on file   Relationships    Social connections     Talks on phone: Not on file     Gets together: Not on file     Attends Gnosticist service: Not on file     Active member of club or organization: Not on file     Attends meetings of clubs or organizations: Not on file     Relationship status: Not on file    Intimate partner violence     Fear of current or ex partner: Not on file     Emotionally abused: Not on file     Physically abused: Not on file     Forced sexual activity: Not on file   Other Topics Concern    Not on file   Social History Narrative    Not on file       Review of Systems:  Review of Systems Constitution: Negative. HENT: Negative. Eyes: Negative. Cardiovascular: Negative. Respiratory: Negative. Endocrine: Negative. Hematologic/Lymphatic: Negative. Skin: Negative. Musculoskeletal: Positive for back pain. Gastrointestinal: Negative. Negative for bowel incontinence. Genitourinary: Positive for frequency. Negative for bladder incontinence. Neurological: Positive for numbness and tingling. Negative for weakness. Psychiatric/Behavioral: Negative. Allergic/Immunologic: Negative. Physical Exam:  /72   Pulse 98   Temp 97.2 °F (36.2 °C)   Ht 5' 4\" (1.626 m)   Wt 257 lb (116.6 kg)   BMI 44.11 kg/m²     Physical Exam  Vitals signs reviewed. Constitutional:       General: She is awake. Appearance: She is not ill-appearing or diaphoretic. HENT:      Right Ear: External ear normal.      Left Ear: External ear normal.   Eyes:      General:         Right eye: No discharge. Left eye: No discharge. Conjunctiva/sclera: Conjunctivae normal.   Neck:      Thyroid: No thyromegaly. Trachea: No tracheal deviation. Pulmonary:      Effort: Pulmonary effort is normal. No respiratory distress. Breath sounds: No stridor. Musculoskeletal:      Lumbar back: She exhibits tenderness. She exhibits no edema and no deformity. Skin:     General: Skin is warm and dry. Neurological:      Mental Status: She is alert and oriented to person, place, and time. Gait: Gait normal.   Psychiatric:         Attention and Perception: Attention and perception normal.         Behavior: Behavior normal.         Record/Diagnostics Review:    As above, I did review the imaging    Orders:  Orders Placed This Encounter   Procedures    W[place] NJX DX/THER SBST INTRLMNR LMBR/SAC W/IMG GDN       Assessment:  1. Lumbar radiculopathy    2. Lumbosacral spondylosis without myelopathy    3.  Spinal stenosis of lumbar region, unspecified whether neurogenic claudication present 4. Chronic bilateral low back pain, unspecified whether sciatica present        Treatment Plan:  DISCUSSION: Treatment options discussed with patient and all questions answered to patient's satisfaction. OARRS Review: Reviewed and acceptable for medications prescribed. TREATMENT OPTIONS:     Discussed different treatment options including continued conservative care such as physical therapy, chiropractic care, acupuncture. Discussed different interventional options such as epidural steroids or medial branch blocks. Also discussed surgical re-evaluation. Wishes to try epidurals. Pain in the low back and legs continues despite conservative measures, may benefit from epidural steroid injections, we'll try the caudal approach x 2..          Leila Collazo M.D. I have reviewed the chief complaint and history of present illness (including ROS and PFSH) and vital documentation by my staff and I agree with their documentation and have added where applicable.

## 2020-11-30 ENCOUNTER — HOSPITAL ENCOUNTER (OUTPATIENT)
Dept: PREADMISSION TESTING | Age: 65
Setting detail: SPECIMEN
Discharge: HOME | End: 2020-12-04
Payer: COMMERCIAL

## 2020-11-30 PROCEDURE — U0003 INFECTIOUS AGENT DETECTION BY NUCLEIC ACID (DNA OR RNA); SEVERE ACUTE RESPIRATORY SYNDROME CORONAVIRUS 2 (SARS-COV-2) (CORONAVIRUS DISEASE [COVID-19]), AMPLIFIED PROBE TECHNIQUE, MAKING USE OF HIGH THROUGHPUT TECHNOLOGIES AS DESCRIBED BY CMS-2020-01-R: HCPCS

## 2020-12-02 LAB — SARS-COV-2, NAA: NOT DETECTED

## 2020-12-03 ENCOUNTER — ANESTHESIA EVENT (OUTPATIENT)
Dept: OPERATING ROOM | Age: 65
End: 2020-12-03
Payer: COMMERCIAL

## 2020-12-04 ENCOUNTER — ANESTHESIA (OUTPATIENT)
Dept: OPERATING ROOM | Age: 65
End: 2020-12-04
Payer: COMMERCIAL

## 2020-12-04 ENCOUNTER — APPOINTMENT (OUTPATIENT)
Dept: GENERAL RADIOLOGY | Age: 65
End: 2020-12-04
Attending: PAIN MEDICINE
Payer: COMMERCIAL

## 2020-12-04 ENCOUNTER — HOSPITAL ENCOUNTER (OUTPATIENT)
Age: 65
Setting detail: OUTPATIENT SURGERY
Discharge: HOME | End: 2020-12-04
Attending: PAIN MEDICINE | Admitting: PAIN MEDICINE
Payer: COMMERCIAL

## 2020-12-04 VITALS
OXYGEN SATURATION: 100 % | DIASTOLIC BLOOD PRESSURE: 81 MMHG | RESPIRATION RATE: 20 BRPM | TEMPERATURE: 96.8 F | HEIGHT: 65 IN | BODY MASS INDEX: 43.15 KG/M2 | HEART RATE: 79 BPM | WEIGHT: 259 LBS | SYSTOLIC BLOOD PRESSURE: 125 MMHG

## 2020-12-04 VITALS — DIASTOLIC BLOOD PRESSURE: 70 MMHG | SYSTOLIC BLOOD PRESSURE: 133 MMHG | OXYGEN SATURATION: 100 %

## 2020-12-04 PROCEDURE — 62323 NJX INTERLAMINAR LMBR/SAC: CPT | Performed by: PAIN MEDICINE

## 2020-12-04 PROCEDURE — 6360000002 HC RX W HCPCS: Performed by: NURSE ANESTHETIST, CERTIFIED REGISTERED

## 2020-12-04 PROCEDURE — 3600000002 HC SURGERY LEVEL 2 BASE: Performed by: PAIN MEDICINE

## 2020-12-04 PROCEDURE — 2709999900 HC NON-CHARGEABLE SUPPLY: Performed by: PAIN MEDICINE

## 2020-12-04 PROCEDURE — 6360000004 HC RX CONTRAST MEDICATION: Performed by: PAIN MEDICINE

## 2020-12-04 PROCEDURE — 6360000002 HC RX W HCPCS: Performed by: PAIN MEDICINE

## 2020-12-04 PROCEDURE — 7100000010 HC PHASE II RECOVERY - FIRST 15 MIN: Performed by: PAIN MEDICINE

## 2020-12-04 PROCEDURE — 7100000011 HC PHASE II RECOVERY - ADDTL 15 MIN: Performed by: PAIN MEDICINE

## 2020-12-04 PROCEDURE — 3700000000 HC ANESTHESIA ATTENDED CARE: Performed by: PAIN MEDICINE

## 2020-12-04 PROCEDURE — 3209999900 FLUORO FOR SURGICAL PROCEDURES

## 2020-12-04 RX ORDER — DIPHENHYDRAMINE HYDROCHLORIDE 50 MG/ML
12.5 INJECTION INTRAMUSCULAR; INTRAVENOUS
Status: DISCONTINUED | OUTPATIENT
Start: 2020-12-04 | End: 2020-12-04 | Stop reason: HOSPADM

## 2020-12-04 RX ORDER — PROMETHAZINE HYDROCHLORIDE 25 MG/ML
12.5 INJECTION, SOLUTION INTRAMUSCULAR; INTRAVENOUS
Status: DISCONTINUED | OUTPATIENT
Start: 2020-12-04 | End: 2020-12-04 | Stop reason: HOSPADM

## 2020-12-04 RX ORDER — SODIUM CHLORIDE, SODIUM LACTATE, POTASSIUM CHLORIDE, CALCIUM CHLORIDE 600; 310; 30; 20 MG/100ML; MG/100ML; MG/100ML; MG/100ML
INJECTION, SOLUTION INTRAVENOUS CONTINUOUS
Status: DISCONTINUED | OUTPATIENT
Start: 2020-12-04 | End: 2020-12-04 | Stop reason: HOSPADM

## 2020-12-04 RX ORDER — MIDAZOLAM HYDROCHLORIDE 1 MG/ML
INJECTION INTRAMUSCULAR; INTRAVENOUS PRN
Status: DISCONTINUED | OUTPATIENT
Start: 2020-12-04 | End: 2020-12-04 | Stop reason: SDUPTHER

## 2020-12-04 RX ORDER — DEXAMETHASONE SODIUM PHOSPHATE 10 MG/ML
INJECTION INTRAMUSCULAR; INTRAVENOUS PRN
Status: DISCONTINUED | OUTPATIENT
Start: 2020-12-04 | End: 2020-12-04 | Stop reason: ALTCHOICE

## 2020-12-04 RX ORDER — OXYCODONE HYDROCHLORIDE AND ACETAMINOPHEN 5; 325 MG/1; MG/1
2 TABLET ORAL PRN
Status: DISCONTINUED | OUTPATIENT
Start: 2020-12-04 | End: 2020-12-04 | Stop reason: HOSPADM

## 2020-12-04 RX ORDER — SODIUM CHLORIDE 0.9 % (FLUSH) 0.9 %
10 SYRINGE (ML) INJECTION EVERY 12 HOURS SCHEDULED
Status: DISCONTINUED | OUTPATIENT
Start: 2020-12-04 | End: 2020-12-04 | Stop reason: HOSPADM

## 2020-12-04 RX ORDER — LABETALOL 20 MG/4 ML (5 MG/ML) INTRAVENOUS SYRINGE
5 EVERY 10 MIN PRN
Status: DISCONTINUED | OUTPATIENT
Start: 2020-12-04 | End: 2020-12-04 | Stop reason: HOSPADM

## 2020-12-04 RX ORDER — 0.9 % SODIUM CHLORIDE 0.9 %
500 INTRAVENOUS SOLUTION INTRAVENOUS
Status: DISCONTINUED | OUTPATIENT
Start: 2020-12-04 | End: 2020-12-04 | Stop reason: HOSPADM

## 2020-12-04 RX ORDER — SODIUM CHLORIDE 0.9 % (FLUSH) 0.9 %
10 SYRINGE (ML) INJECTION PRN
Status: DISCONTINUED | OUTPATIENT
Start: 2020-12-04 | End: 2020-12-04 | Stop reason: HOSPADM

## 2020-12-04 RX ORDER — MEPERIDINE HYDROCHLORIDE 50 MG/ML
12.5 INJECTION INTRAMUSCULAR; INTRAVENOUS; SUBCUTANEOUS EVERY 5 MIN PRN
Status: DISCONTINUED | OUTPATIENT
Start: 2020-12-04 | End: 2020-12-04 | Stop reason: HOSPADM

## 2020-12-04 RX ORDER — ONDANSETRON 2 MG/ML
4 INJECTION INTRAMUSCULAR; INTRAVENOUS
Status: DISCONTINUED | OUTPATIENT
Start: 2020-12-04 | End: 2020-12-04 | Stop reason: HOSPADM

## 2020-12-04 RX ORDER — MORPHINE SULFATE 2 MG/ML
2 INJECTION, SOLUTION INTRAMUSCULAR; INTRAVENOUS EVERY 5 MIN PRN
Status: DISCONTINUED | OUTPATIENT
Start: 2020-12-04 | End: 2020-12-04 | Stop reason: HOSPADM

## 2020-12-04 RX ORDER — OXYCODONE HYDROCHLORIDE AND ACETAMINOPHEN 5; 325 MG/1; MG/1
1 TABLET ORAL PRN
Status: DISCONTINUED | OUTPATIENT
Start: 2020-12-04 | End: 2020-12-04 | Stop reason: HOSPADM

## 2020-12-04 RX ORDER — LIDOCAINE HYDROCHLORIDE 10 MG/ML
1 INJECTION, SOLUTION EPIDURAL; INFILTRATION; INTRACAUDAL; PERINEURAL
Status: DISCONTINUED | OUTPATIENT
Start: 2020-12-04 | End: 2020-12-04 | Stop reason: HOSPADM

## 2020-12-04 RX ORDER — FENTANYL CITRATE 50 UG/ML
INJECTION, SOLUTION INTRAMUSCULAR; INTRAVENOUS PRN
Status: DISCONTINUED | OUTPATIENT
Start: 2020-12-04 | End: 2020-12-04 | Stop reason: SDUPTHER

## 2020-12-04 RX ADMIN — FENTANYL CITRATE 100 MCG: 50 INJECTION, SOLUTION INTRAMUSCULAR; INTRAVENOUS at 12:13

## 2020-12-04 RX ADMIN — MIDAZOLAM HYDROCHLORIDE 2 MG: 1 INJECTION, SOLUTION INTRAMUSCULAR; INTRAVENOUS at 12:11

## 2020-12-04 ASSESSMENT — PULMONARY FUNCTION TESTS
PIF_VALUE: 1

## 2020-12-04 ASSESSMENT — PAIN DESCRIPTION - DESCRIPTORS: DESCRIPTORS: SQUEEZING

## 2020-12-04 ASSESSMENT — PAIN - FUNCTIONAL ASSESSMENT: PAIN_FUNCTIONAL_ASSESSMENT: 0-10

## 2020-12-04 ASSESSMENT — PAIN SCALES - GENERAL: PAINLEVEL_OUTOF10: 0

## 2020-12-04 NOTE — ANESTHESIA POSTPROCEDURE EVALUATION
Department of Anesthesiology  Postprocedure Note    Patient: Neris Desai  MRN: [id number]  YOB: 1955  Date of evaluation: 12/4/2020  Time:  12:26 PM     Procedure Summary     Date:  12/04/20 Room / Location:  [facility] [address]    Anesthesia Start:  1209 Anesthesia Stop:  1222    Procedure:  EPIDURAL STEROID INJECTION (N/A ) Diagnosis:  (M54.16 RADICULOPATHY)    Surgeon:  Charlene Salas MD Responsible Provider:  KEY Knox CRNA    Anesthesia Type:  MAC ASA Status:  2          Anesthesia Type: MAC    Marie Phase I: Marie Score: 10    Marie Phase II: Marie Score: 9    Last vitals: Reviewed and per EMR flowsheets.        Anesthesia Post Evaluation    Patient location during evaluation: PACU  Patient participation: complete - patient participated  Level of consciousness: awake and alert  Airway patency: patent  Nausea & Vomiting: no nausea and no vomiting  Complications: no  Cardiovascular status: hemodynamically stable  Respiratory status: nasal cannula and spontaneous ventilation  Hydration status: euvolemic

## 2020-12-04 NOTE — OP NOTE
applied a sterile dressing. The patient tolerated the procedure well. No complications occurred. Patient transferred to the recovery room in satisfactory condition. Appropriate written discharge instructions given to the patient.     Sebas Rhodes

## 2020-12-04 NOTE — H&P
Pain Pre-Op H&P Note    Sigmund Given Evita    HPI: Angel Porras  presents with back and leg pain.     Past Medical History:   Diagnosis Date    Morbid obesity ([address] 4/17/2015       Past Surgical History:   Procedure Laterality Date    CHOLECYSTECTOMY  1/2000       Family History   Problem Relation Age of Onset    Stroke Father        No Known Allergies      Current Facility-Administered Medications:     lactated ringers infusion, , Intravenous, Continuous, Romario Baxter MD    sodium chloride flush 0.9 % injection 10 mL, 10 mL, Intravenous, 2 times per day, Romario Baxter MD    sodium chloride flush 0.9 % injection 10 mL, 10 mL, Intravenous, PRN, Romario Baxter MD    lidocaine PF 1 % injection 1 mL, 1 mL, Intradermal, Once PRN, Romario Baxter MD    morphine (PF) injection 2 mg, 2 mg, Intravenous, Q5 Min PRN, Romario Baxter MD    HYDROmorphone (DILAUDID) injection 0.5 mg, 0.5 mg, Intravenous, Q5 Min PRN, Romario Baxter MD    HYDROmorphone (DILAUDID) injection 0.25 mg, 0.25 mg, Intravenous, Q5 Min PRN, Romario Baxter MD    HYDROmorphone (DILAUDID) injection 0.5 mg, 0.5 mg, Intravenous, Q5 Min PRN, Romario Baxter MD    oxyCODONE-acetaminophen (PERCOCET) 5-325 MG per tablet 1 tablet, 1 tablet, Oral, PRN **OR** oxyCODONE-acetaminophen (PERCOCET) 5-325 MG per tablet 2 tablet, 2 tablet, Oral, PRN, Romario Baxter MD    diphenhydrAMINE (BENADRYL) injection 12.5 mg, 12.5 mg, Intravenous, Once PRN, Romario Baxter MD    0.9 % sodium chloride bolus, 500 mL, Intravenous, Once PRN, Romario Baxter MD    ondansetron (ZOFRAN) injection 4 mg, 4 mg, Intravenous, Once PRN, Romario Baxter MD    promethazine (PHENERGAN) injection 12.5 mg, 12.5 mg, Intravenous, Q15 Min PRN, Romario Baxter MD    labetalol (NORMODYNE;TRANDATE) injection syringe 5 mg, 5 mg, Intravenous, Q10 Min PRN, Romario Baxter MD    meperidine (DEMEROL) injection 12.5 mg, 12.5 mg, Intravenous, Q5 Min Enrique SALINAS MD    Social History     Tobacco Use    Smoking status: Former Smoker     Types: Cigarettes     Start date: 1990     Last attempt to quit: 1999     Years since quittin.9    Smokeless tobacco: Never Used   Substance Use Topics    Alcohol use: No       Review of Systems:   Focused review of systems was performed, and negative as pertinent to diagnosis, except as stated in HPI. Physical Exam:     /70   Pulse 82   Temp 98.3 °F (36.8 °C) (Temporal)   Resp 16   Ht 5' 4.5\" (1.638 m)   Wt 259 lb (117.5 kg)   SpO2 100%   BMI 43.77 kg/m²     Physical Exam  Constitutional:       Appearance: Normal appearance. HENT:      Head: Normocephalic and atraumatic. Eyes:      General: Lids are normal.   Neck:      Musculoskeletal: Normal range of motion. Pulmonary:      Effort: Pulmonary effort is normal.   Skin:     Comments: Visualized skin with no rash   Neurological:      Mental Status: She is alert. Psychiatric:         Attention and Perception: Attention and perception normal.         Mood and Affect: Mood and affect normal.            Patient's current physical status, medications, medical history, and HPI have been reviewed and updated as appropriate on this date: 20    Risk/Benefit(s): The risks, benefits, alternatives, and potential complications have been discussed with the patient/family and informed consent has been obtained for the procedure/sedation.     Diagnosis:   M54.16 RADICULOPATHY      Plan: [facility]

## 2020-12-04 NOTE — ANESTHESIA PRE PROCEDURE
Department of Anesthesiology  Preprocedure Note       Name:  Esther Gallegos   Age:  60 y.o.  :  1955                                          MRN:  [id number]         Date:  2020      Surgeon: Jerrod Dang):  Jonnie Gan MD    Procedure: Procedure(s):  EPIDURAL STEROID INJECTION    Medications prior to admission:   Prior to Admission medications    Medication Sig Start Date End Date Taking? Authorizing Provider   gabapentin (NEURONTIN) 300 MG capsule Take 1 capsule by mouth 3 times daily for 120 days. 20 Yes Lux Crawford MD   acetaminophen (TYLENOL) 325 MG tablet Take 650 mg by mouth every 6 hours as needed for Pain   Yes Historical Provider, MD   ibuprofen (ADVIL;MOTRIN) 800 MG tablet Take 1 tablet by mouth every 8 hours as needed for Pain 10/4/17  Yes Amanda Mendiola MD   Omega-3 Fatty Acids (OMEGA 3 PO) Take by mouth daily    Yes Historical Provider, MD   vitamin D (ERGOCALCIFEROL) 1.25 MG (21272 UT) CAPS capsule Take 50,000 Units by mouth once a week    Historical Provider, MD   Misc. Devices MISC 1 each by Does not apply route once as needed (self directed muscle massage) Please dispense one theracane device. Massage back as tolerated to relieve muscle spasms.  20  Lux Crawford MD       Current medications:    Current Facility-Administered Medications   Medication Dose Route Frequency Provider Last Rate Last Dose    lactated ringers infusion   Intravenous Continuous Vijay Joel MD        sodium chloride flush 0.9 % injection 10 mL  10 mL Intravenous 2 times per day Vijay Joel MD        sodium chloride flush 0.9 % injection 10 mL  10 mL Intravenous PRN Vijay Joel MD        lidocaine PF 1 % injection 1 mL  1 mL Intradermal Once PRN Vijay Joel MD           Allergies:  No Known Allergies    Problem List:    Patient Active Problem List   Diagnosis Code    Obesity, morbid, BMI 40.0-49.9 ([facility] E66.01    Prediabetes R73.03    Arthritis of right knee M17.11       Past Medical History:        Diagnosis Date    Morbid obesity ([address] 2015       Past Surgical History:        Procedure Laterality Date    CHOLECYSTECTOMY  2000       Social History:    Social History     Tobacco Use    Smoking status: Former Smoker     Types: Cigarettes     Start date: 1990     Last attempt to quit: 1999     Years since quittin.9    Smokeless tobacco: Never Used   Substance Use Topics    Alcohol use: No                                Counseling given: Not Answered      Vital Signs (Current):   Vitals:    20 1132   BP: 135/70   Pulse: 82   Resp: 16   Temp: 98.3 °F (36.8 °C)   TempSrc: Temporal   SpO2: 100%   Weight: 259 lb (117.5 kg)   Height: 5' 4.5\" (1.638 m)                                              BP Readings from Last 3 Encounters:   20 135/70   20 137/72   20 136/78       NPO Status: Time of last liquid consumption:                         Time of last solid consumption:                         Date of last liquid consumption: 20                        Date of last solid food consumption: 20    BMI:   Wt Readings from Last 3 Encounters:   20 259 lb (117.5 kg)   20 257 lb (116.6 kg)   20 255 lb 3.2 oz (115.8 kg)     Body mass index is 43.77 kg/m².     CBC:   Lab Results   Component Value Date    WBC 3.2 2020    RBC 4.37 2020    HGB 12.7 2020    HCT 39.1 2020    MCV 89.5 2020    RDW 13.7 2020     2020       CMP:   Lab Results   Component Value Date     2020    K 4.0 2020    CL 99 2020    CO2 26 2020    BUN 11 2020    CREATININE 0.85 2020    GFRAA >60 2020    LABGLOM >60 2020    GLUCOSE 104 2020    PROT 7.7 2020    CALCIUM 10.3 2020    BILITOT 0.34 2020    ALKPHOS 68 2020    AST 43 2020    ALT 21 2020       POC Tests: No results for input(s): POCGLU, POCNA, POCK, POCCL, POCBUN, POCHEMO, POCHCT in the last 72 hours. Coags:   Lab Results   Component Value Date    PROTIME 13.0 04/30/2020    INR 1.0 04/30/2020    APTT 28.3 04/30/2020       HCG (If Applicable): No results found for: PREGTESTUR, PREGSERUM, HCG, HCGQUANT     ABGs: No results found for: PHART, PO2ART, FBI9NHF, NUD1CIY, BEART, J9INZFVQ     Type & Screen (If Applicable):  No results found for: LABABO, LABRH    Drug/Infectious Status (If Applicable):  No results found for: HIV, HEPCAB    COVID-19 Screening (If Applicable):   Lab Results   Component Value Date    COVID19 Not Detected 11/30/2020         Anesthesia Evaluation  Patient summary reviewed and Nursing notes reviewed  Airway: Mallampati: III  TM distance: >3 FB   Neck ROM: full  Mouth opening: > = 3 FB Dental:    (+) upper dentures  Comment: -LOWER PARTIAL    Pulmonary:Negative Pulmonary ROS and normal exam                              ROS comment: -QUIT SMOKING 1999   Cardiovascular:Negative CV ROS          ECG reviewed                     ROS comment: -EKG - SR @ 91, LEFT ATRIAL ENLARGEMENT     Neuro/Psych:   Negative Neuro/Psych ROS              GI/Hepatic/Renal:   (+) morbid obesity          Endo/Other:    (+) : arthritis:., .                  ROS comment: -NPO AFTER MIDNIGHT  -NKDA Abdominal:           Vascular: negative vascular ROS. Anesthesia Plan      MAC     ASA 2       Induction: intravenous. MIPS: Postoperative opioids intended and Prophylactic antiemetics administered. Anesthetic plan and risks discussed with patient. Plan discussed with CRNA.     Attending anesthesiologist reviewed and agrees with Celsa Cerda MD   12/4/2020

## 2020-12-07 ENCOUNTER — HOSPITAL ENCOUNTER (OUTPATIENT)
Dept: PREADMISSION TESTING | Age: 65
Setting detail: SPECIMEN
Discharge: HOME | End: 2020-12-11
Payer: COMMERCIAL

## 2020-12-07 PROCEDURE — U0003 INFECTIOUS AGENT DETECTION BY NUCLEIC ACID (DNA OR RNA); SEVERE ACUTE RESPIRATORY SYNDROME CORONAVIRUS 2 (SARS-COV-2) (CORONAVIRUS DISEASE [COVID-19]), AMPLIFIED PROBE TECHNIQUE, MAKING USE OF HIGH THROUGHPUT TECHNOLOGIES AS DESCRIBED BY CMS-2020-01-R: HCPCS

## 2020-12-08 LAB
SARS-COV-2, RAPID: NORMAL
SARS-COV-2: NORMAL
SARS-COV-2: NOT DETECTED
SOURCE: NORMAL

## 2020-12-09 ENCOUNTER — TELEPHONE (OUTPATIENT)
Dept: PRIMARY CARE CLINIC | Age: 65
End: 2020-12-09

## 2021-01-12 ENCOUNTER — HOSPITAL ENCOUNTER (OUTPATIENT)
Age: 66
Setting detail: SPECIMEN
Discharge: HOME | End: 2021-01-12
Payer: COMMERCIAL

## 2021-01-12 LAB
ALBUMIN SERPL-MCNC: 3.9 G/DL (ref 3.5–5.2)
ALBUMIN/GLOBULIN RATIO: 1.1 (ref 1–2.5)
ALP BLD-CCNC: 81 U/L (ref 35–104)
ALT SERPL-CCNC: 12 U/L (ref 5–33)
ANION GAP SERPL CALCULATED.3IONS-SCNC: 11 MMOL/L (ref 9–17)
AST SERPL-CCNC: 17 U/L
BILIRUB SERPL-MCNC: 0.25 MG/DL (ref 0.3–1.2)
BUN BLDV-MCNC: 13 MG/DL (ref 8–23)
BUN/CREAT BLD: ABNORMAL (ref 9–20)
CALCIUM SERPL-MCNC: 11.1 MG/DL (ref 8.6–10.4)
CHLORIDE BLD-SCNC: 104 MMOL/L (ref 98–107)
CO2: 27 MMOL/L (ref 20–31)
CREAT SERPL-MCNC: 0.9 MG/DL (ref 0.5–0.9)
GFR AFRICAN AMERICAN: >60 ML/MIN
GFR NON-AFRICAN AMERICAN: >60 ML/MIN
GFR SERPL CREATININE-BSD FRML MDRD: ABNORMAL ML/MIN/{1.73_M2}
GFR SERPL CREATININE-BSD FRML MDRD: ABNORMAL ML/MIN/{1.73_M2}
GLUCOSE BLD-MCNC: 88 MG/DL (ref 70–99)
POTASSIUM SERPL-SCNC: 4.7 MMOL/L (ref 3.7–5.3)
PTH INTACT: 101.7 PG/ML (ref 15–65)
SODIUM BLD-SCNC: 142 MMOL/L (ref 135–144)
TOTAL PROTEIN: 7.5 G/DL (ref 6.4–8.3)
VITAMIN D 25-HYDROXY: 21.2 NG/ML (ref 30–100)

## 2021-01-14 ENCOUNTER — VIRTUAL VISIT (OUTPATIENT)
Dept: PAIN MANAGEMENT | Age: 66
End: 2021-01-14
Payer: COMMERCIAL

## 2021-01-14 DIAGNOSIS — M48.061 SPINAL STENOSIS OF LUMBAR REGION, UNSPECIFIED WHETHER NEUROGENIC CLAUDICATION PRESENT: ICD-10-CM

## 2021-01-14 DIAGNOSIS — M54.16 LUMBAR RADICULOPATHY: Primary | ICD-10-CM

## 2021-01-14 PROCEDURE — G8428 CUR MEDS NOT DOCUMENT: HCPCS | Performed by: PAIN MEDICINE

## 2021-01-14 PROCEDURE — 99213 OFFICE O/P EST LOW 20 MIN: CPT | Performed by: PAIN MEDICINE

## 2021-01-14 PROCEDURE — 3017F COLORECTAL CA SCREEN DOC REV: CPT | Performed by: PAIN MEDICINE

## 2021-01-14 ASSESSMENT — ENCOUNTER SYMPTOMS
BACK PAIN: 1
BOWEL INCONTINENCE: 0
COUGH: 0

## 2021-01-14 NOTE — PROGRESS NOTES
HPI:     Back Pain  This is a chronic problem. The current episode started more than 1 year ago. The problem occurs constantly. The problem has been gradually worsening since onset. The pain is present in the lumbar spine. The quality of the pain is described as aching. The pain is moderate. The pain is the same all the time. The symptoms are aggravated by position. Stiffness is present all day. Associated symptoms include leg pain. Pertinent negatives include no bowel incontinence, chest pain or fever. Back down the leg. MRI with severe stenosis. Caudal epidural with some benefit. Still has some lingering pain. Patient denies any new neurological symptoms. Nobowel or bladder incontinence, no weakness, and no falling. Review of OARRS does not show any aberrant prescription behavior. .    Past Medical History:   Diagnosis Date    Morbid obesity ([facility] 4/17/2015       Past Surgical History:   Procedure Laterality Date    CHOLECYSTECTOMY  1/2000    PAIN MANAGEMENT PROCEDURE  12/04/2020    EPIDURAL STEROID INJECTION    PAIN MANAGEMENT PROCEDURE N/A 12/4/2020    EPIDURAL STEROID INJECTION performed by Lesa Melendez MD at [address] [address].       No Known Allergies      Current Outpatient Medications:     gabapentin (NEURONTIN) 300 MG capsule, Take 1 capsule by mouth 3 times daily for 120 days. , Disp: 90 capsule, Rfl: 3    vitamin D (ERGOCALCIFEROL) 1.25 MG (96708 UT) CAPS capsule, Take 50,000 Units by mouth once a week, Disp: , Rfl:     acetaminophen (TYLENOL) 325 MG tablet, Take 650 mg by mouth every 6 hours as needed for Pain, Disp: , Rfl:     Misc. Devices MISC, 1 each by Does not apply route once as needed (self directed muscle massage) Please dispense one theracane device. Massage back as tolerated to relieve muscle spasms. , Disp: 1 Device, Rfl: 0    ibuprofen (ADVIL;MOTRIN) 800 MG tablet, Take 1 tablet by mouth every 8 hours as needed for Pain, Disp: 120 tablet, Rfl: 3   Omega-3 Fatty Acids (OMEGA 3 PO), Take by mouth daily , Disp: , Rfl:     Family History   Problem Relation Age of Onset    Stroke Father        Social History     Socioeconomic History    Marital status: Single     Spouse name: Not on file    Number of children: Not on file    Years of education: Not on file    Highest education level: Not on file   Occupational History    Not on file   Social Needs    Financial resource strain: Not on file    Food insecurity     Worry: Not on file     Inability: Not on file    Transportation needs     Medical: Not on file     Non-medical: Not on file   Tobacco Use    Smoking status: Former Smoker     Types: Cigarettes     Start date: 1990     Quit date: 1999     Years since quittin.0    Smokeless tobacco: Never Used   Substance and Sexual Activity    Alcohol use: No    Drug use: No    Sexual activity: Never   Lifestyle    Physical activity     Days per week: Not on file     Minutes per session: Not on file    Stress: Not on file   Relationships    Social connections     Talks on phone: Not on file     Gets together: Not on file     Attends [employer] service: Not on file     Active member of club or organization: Not on file     Attends meetings of clubs or organizations: Not on file     Relationship status: Not on file    Intimate partner violence     Fear of current or ex partner: Not on file     Emotionally abused: Not on file     Physically abused: Not on file     Forced sexual activity: Not on file   Other Topics Concern    Not on file   Social History Narrative    Not on file       Review of Systems:  Review of Systems   Constitution: Negative for fever. Cardiovascular: Negative for chest pain. Respiratory: Negative for cough. Musculoskeletal: Positive for back pain. Gastrointestinal: Negative for bowel incontinence. Physical Exam:    Physical Exam  Constitutional:       Appearance: Normal appearance.    Pulmonary: Effort: Pulmonary effort is normal.   Neurological:      Mental Status: She is alert. Psychiatric:         Attention and Perception: Attention and perception normal.         Mood and Affect: Mood and affect normal.         Record/Diagnostics Review:    As above, I did review the imaging    Orders:  Orders Placed This Encounter   Procedures    OR NJX DX/THER SBST INTRLMNR LMBR/SAC W/IMG GDN     Assessment:  1. Lumbar radiculopathy    2. Spinal stenosis of lumbar region, unspecified whether neurogenic claudication present        Treatment Plan:  DISCUSSION: Treatment options discussed with patient and all questions answered to patient's satisfaction. OARRS Review: Reviewed and acceptable for medications prescribed. TREATMENT OPTIONS:     Discussed different treatment options including continued conservative care such as physical therapy, chiropractic care, acupuncture. Discussed different interventional options such as epidural steroids or medial branch blocks. Also discussed surgical evaluation -multilevel stenosis, however she declined surgical evaluation. Pain in the low back and legs continues despite conservative measures, may benefit from epidural steroid injections, we'll try the caudal approach. Consider increasing gabapentin to 600mg 3 times daily or changing to Lyrica. Daljit Mosqueda M.D. I have reviewed the chief complaint and history of present illness (including ROS and PFSH) and vital documentation by my staff and I agree with their documentation and have added where applicable. Minh Campoverde is a 60 y.o. female being evaluated by a Virtual Visit (video visit) encounter to address concerns as mentioned above. A caregiver was present when appropriate. Due to this being a TeleHealth encounter (During [facility]- public health emergency), evaluation of the following organ systems was limited: Vitals/Constitutional/EENT/Resp/CV/GI//MS/Neuro/Skin/Heme-Lymph-Imm. Pursuant to the emergency declaration under the [address] [place] [address] and the [employer] Act, this Virtual Visit was conducted with patient's (and/or legal guardian's) consent, to reduce the patient's risk of exposure to COVID-19 and provide necessary medical care. The patient (and/or legal guardian) has also been advised to contact this office for worsening conditions or problems, and seek emergency medical treatment and/or call 911 if deemed necessary. Patient identification was verified at the start of the visit: Yes    Total time spent for this encounter: Not billed by time    Services were provided through a video synchronous discussion virtually to substitute for in-person clinic visit. Patient and provider were located at their individual homes. --Flakita Jolly MD on 1/14/2021 at 1:37 PM    An electronic signature was used to authenticate this note.

## 2021-01-18 ENCOUNTER — HOSPITAL ENCOUNTER (OUTPATIENT)
Age: 66
Setting detail: SPECIMEN
Discharge: HOME | End: 2021-01-18
Payer: COMMERCIAL

## 2021-01-18 LAB
CALCIUM TIMED UR: NORMAL MG/X H
CALCIUM URINE: 13.7 MG/DL
CALCIUM, URINE: 130 MG/24 H (ref 20–275)
CREATININE TIMED UR: NORMAL MG/ X H
CREATININE URINE: 160.2 MG/DL
CREATININE, 24H UR: 1520 MG/24 H (ref 740–1570)
HOURS COLLECTED: 24 H
HOURS COLLECTED: 24 H
VOLUME: 949 ML
VOLUME: 949 ML

## 2021-01-21 ENCOUNTER — ANESTHESIA EVENT (OUTPATIENT)
Dept: OPERATING ROOM | Age: 66
End: 2021-01-21
Payer: COMMERCIAL

## 2021-01-25 ENCOUNTER — HOSPITAL ENCOUNTER (OUTPATIENT)
Dept: LAB | Age: 66
Setting detail: SPECIMEN
Discharge: HOME | End: 2021-01-25
Payer: COMMERCIAL

## 2021-01-25 DIAGNOSIS — Z01.818 PRE-OP TESTING: Primary | ICD-10-CM

## 2021-01-25 PROCEDURE — U0003 INFECTIOUS AGENT DETECTION BY NUCLEIC ACID (DNA OR RNA); SEVERE ACUTE RESPIRATORY SYNDROME CORONAVIRUS 2 (SARS-COV-2) (CORONAVIRUS DISEASE [COVID-19]), AMPLIFIED PROBE TECHNIQUE, MAKING USE OF HIGH THROUGHPUT TECHNOLOGIES AS DESCRIBED BY CMS-2020-01-R: HCPCS

## 2021-01-25 PROCEDURE — U0005 INFEC AGEN DETEC AMPLI PROBE: HCPCS

## 2021-01-26 LAB
SARS-COV-2, RAPID: NORMAL
SARS-COV-2: NORMAL
SARS-COV-2: NOT DETECTED
SOURCE: NORMAL

## 2021-01-29 ENCOUNTER — HOSPITAL ENCOUNTER (OUTPATIENT)
Age: 66
Setting detail: OUTPATIENT SURGERY
Discharge: HOME | End: 2021-01-29
Attending: PAIN MEDICINE | Admitting: PAIN MEDICINE
Payer: COMMERCIAL

## 2021-01-29 ENCOUNTER — ANESTHESIA (OUTPATIENT)
Dept: OPERATING ROOM | Age: 66
End: 2021-01-29
Payer: COMMERCIAL

## 2021-01-29 ENCOUNTER — APPOINTMENT (OUTPATIENT)
Dept: GENERAL RADIOLOGY | Age: 66
End: 2021-01-29
Attending: PAIN MEDICINE
Payer: COMMERCIAL

## 2021-01-29 VITALS
HEIGHT: 65 IN | BODY MASS INDEX: 42.32 KG/M2 | WEIGHT: 254 LBS | RESPIRATION RATE: 19 BRPM | DIASTOLIC BLOOD PRESSURE: 85 MMHG | OXYGEN SATURATION: 97 % | HEART RATE: 77 BPM | SYSTOLIC BLOOD PRESSURE: 137 MMHG | TEMPERATURE: 97 F

## 2021-01-29 VITALS
DIASTOLIC BLOOD PRESSURE: 69 MMHG | SYSTOLIC BLOOD PRESSURE: 152 MMHG | OXYGEN SATURATION: 100 % | RESPIRATION RATE: 17 BRPM

## 2021-01-29 PROCEDURE — 3209999900 FLUORO FOR SURGICAL PROCEDURES

## 2021-01-29 PROCEDURE — 3600000002 HC SURGERY LEVEL 2 BASE: Performed by: PAIN MEDICINE

## 2021-01-29 PROCEDURE — 2709999900 HC NON-CHARGEABLE SUPPLY: Performed by: PAIN MEDICINE

## 2021-01-29 PROCEDURE — 3700000001 HC ADD 15 MINUTES (ANESTHESIA): Performed by: PAIN MEDICINE

## 2021-01-29 PROCEDURE — 3700000000 HC ANESTHESIA ATTENDED CARE: Performed by: PAIN MEDICINE

## 2021-01-29 PROCEDURE — 3600000012 HC SURGERY LEVEL 2 ADDTL 15MIN: Performed by: PAIN MEDICINE

## 2021-01-29 PROCEDURE — 62323 NJX INTERLAMINAR LMBR/SAC: CPT | Performed by: PAIN MEDICINE

## 2021-01-29 PROCEDURE — 7100000011 HC PHASE II RECOVERY - ADDTL 15 MIN: Performed by: PAIN MEDICINE

## 2021-01-29 PROCEDURE — 7100000010 HC PHASE II RECOVERY - FIRST 15 MIN: Performed by: PAIN MEDICINE

## 2021-01-29 PROCEDURE — 6360000002 HC RX W HCPCS: Performed by: PAIN MEDICINE

## 2021-01-29 PROCEDURE — 6360000004 HC RX CONTRAST MEDICATION: Performed by: PAIN MEDICINE

## 2021-01-29 PROCEDURE — 6360000002 HC RX W HCPCS: Performed by: NURSE ANESTHETIST, CERTIFIED REGISTERED

## 2021-01-29 RX ORDER — SODIUM CHLORIDE 0.9 % (FLUSH) 0.9 %
10 SYRINGE (ML) INJECTION PRN
Status: DISCONTINUED | OUTPATIENT
Start: 2021-01-29 | End: 2021-01-29 | Stop reason: HOSPADM

## 2021-01-29 RX ORDER — MIDAZOLAM HYDROCHLORIDE 1 MG/ML
INJECTION INTRAMUSCULAR; INTRAVENOUS PRN
Status: DISCONTINUED | OUTPATIENT
Start: 2021-01-29 | End: 2021-01-29 | Stop reason: SDUPTHER

## 2021-01-29 RX ORDER — SODIUM CHLORIDE 0.9 % (FLUSH) 0.9 %
10 SYRINGE (ML) INJECTION EVERY 12 HOURS SCHEDULED
Status: DISCONTINUED | OUTPATIENT
Start: 2021-01-29 | End: 2021-01-29 | Stop reason: HOSPADM

## 2021-01-29 RX ORDER — FENTANYL CITRATE 50 UG/ML
INJECTION, SOLUTION INTRAMUSCULAR; INTRAVENOUS PRN
Status: DISCONTINUED | OUTPATIENT
Start: 2021-01-29 | End: 2021-01-29 | Stop reason: SDUPTHER

## 2021-01-29 RX ORDER — DEXAMETHASONE SODIUM PHOSPHATE 10 MG/ML
INJECTION INTRAMUSCULAR; INTRAVENOUS PRN
Status: DISCONTINUED | OUTPATIENT
Start: 2021-01-29 | End: 2021-01-29 | Stop reason: ALTCHOICE

## 2021-01-29 RX ADMIN — MIDAZOLAM HYDROCHLORIDE 2 MG: 1 INJECTION, SOLUTION INTRAMUSCULAR; INTRAVENOUS at 12:22

## 2021-01-29 RX ADMIN — FENTANYL CITRATE 50 MCG: 50 INJECTION, SOLUTION INTRAMUSCULAR; INTRAVENOUS at 12:25

## 2021-01-29 RX ADMIN — FENTANYL CITRATE 50 MCG: 50 INJECTION, SOLUTION INTRAMUSCULAR; INTRAVENOUS at 12:22

## 2021-01-29 ASSESSMENT — PAIN SCALES - GENERAL
PAINLEVEL_OUTOF10: 0
PAINLEVEL_OUTOF10: 0

## 2021-01-29 ASSESSMENT — PULMONARY FUNCTION TESTS
PIF_VALUE: 0

## 2021-01-29 ASSESSMENT — PAIN - FUNCTIONAL ASSESSMENT: PAIN_FUNCTIONAL_ASSESSMENT: 0-10

## 2021-01-29 NOTE — ANESTHESIA POSTPROCEDURE EVALUATION
POST- ANESTHESIA EVALUATION       Pt Name: Tracy Vallecillo  MRN: [id number]  [place]: 1955  Date of evaluation: 1/29/2021  Time:  12:58 PM      /85   Pulse 77   Temp 97 °F (36.1 °C) (Temporal)   Resp 18   Ht 5' 4.5\" (1.638 m)   Wt 254 lb (115.2 kg)   SpO2 97%   BMI 42.93 kg/m²      Consciousness Level  Awake  Cardiopulmonary Status  Stable  Pain Adequately Treated YES  Nausea / Vomiting  NO  Adequate Hydration  YES  Anesthesia Related Complications NONE      Electronically signed by Ijeoma Steele MD on 1/29/2021 at 12:58 PM       Department of Anesthesiology  Postprocedure Note    Patient: Tracy Vallecillo  MRN: [id number]  [place]: 1955  Date of evaluation: 1/29/2021  Time:  12:58 PM     Procedure Summary     Date: 01/29/21 Room / Location: [address] [place] [address] [facility]    Anesthesia Start: 6577 Anesthesia Stop: 6717    Procedure: EPIDURAL STEROID INJECTION - CAUDAL (N/A ) Diagnosis: (RADICULOPATHY)    Surgeons: 801 Marianela Spear MD Responsible Provider: Ijeoma Steele MD    Anesthesia Type: MAC ASA Status: 3          Anesthesia Type: MAC    Marie Phase I: Marie Score: 10    Marie Phase II: Marie Score: 10    Last vitals: Reviewed and per EMR flowsheets.        Anesthesia Post Evaluation

## 2021-01-29 NOTE — ANESTHESIA PRE PROCEDURE
Department of Anesthesiology  Preprocedure Note       Name:  Lorie Vazquez   Age:  60 y.o.  :  1955                                          MRN:  [id number]         Date:  2021      Surgeon: Antonella Clemente):  Rasheeda Stuart MD    Procedure: Procedure(s):  EPIDURAL STEROID INJECTION - CAUDAL    Medications prior to admission:   Prior to Admission medications    Medication Sig Start Date End Date Taking? Authorizing Provider   gabapentin (NEURONTIN) 300 MG capsule Take 1 capsule by mouth 3 times daily for 120 days. 20  Chinmay Tariq MD   vitamin D (ERGOCALCIFEROL) 1.25 MG (76977 UT) CAPS capsule Take 50,000 Units by mouth once a week    Historical Provider, MD   acetaminophen (TYLENOL) 325 MG tablet Take 650 mg by mouth every 6 hours as needed for Pain    Historical Provider, MD   Misc. Devices MISC 1 each by Does not apply route once as needed (self directed muscle massage) Please dispense one theracane device. Massage back as tolerated to relieve muscle spasms.  20  Chinmay Tariq MD   ibuprofen (ADVIL;MOTRIN) 800 MG tablet Take 1 tablet by mouth every 8 hours as needed for Pain 10/4/17   Ann Briggs MD   Omega-3 Fatty Acids (OMEGA 3 PO) Take by mouth daily     Historical Provider, MD       Current medications:    Current Facility-Administered Medications   Medication Dose Route Frequency Provider Last Rate Last Admin    sodium chloride flush 0.9 % injection 10 mL  10 mL Intravenous 2 times per day Karla Flores MD        sodium chloride flush 0.9 % injection 10 mL  10 mL Intravenous PRN Karla Flores MD           Allergies:  No Known Allergies    Problem List:    Patient Active Problem List   Diagnosis Code    Obesity, morbid, BMI 40.0-49.9 ([facility] E66.01    Prediabetes R73.03    Arthritis of right knee M17.11       Past Medical History:        Diagnosis Date    Morbid obesity ([facility] 2015       Past Surgical History:        Procedure Laterality Date HCG (If Applicable): No results found for: PREGTESTUR, PREGSERUM, HCG, HCGQUANT     ABGs: No results found for: PHART, PO2ART, KYA8UQU, FSW6KGK, BEART, P7BAWETQ     Type & Screen (If Applicable):  No results found for: LABABO, LABRH    Drug/Infectious Status (If Applicable):  No results found for: HIV, HEPCAB    COVID-19 Screening (If Applicable):   Lab Results   Component Value Date    COVID19 Not Detected 01/25/2021    COVID19 Not Detected 11/30/2020         Anesthesia Evaluation  Patient summary reviewed and Nursing notes reviewed no history of anesthetic complications:   Airway: Mallampati: III  TM distance: >3 FB   Neck ROM: full  Mouth opening: > = 3 FB Dental: normal exam         Pulmonary:Negative Pulmonary ROS and normal exam  breath sounds clear to auscultation                             Cardiovascular:Negative CV ROS            Rhythm: regular  Rate: normal           Beta Blocker:  Not on Beta Blocker         Neuro/Psych:   Negative Neuro/Psych ROS              GI/Hepatic/Renal:   (+) morbid obesity          Endo/Other:    (+) : arthritis:., .                 Abdominal:   (+) obese,     Abdomen: soft. Vascular: negative vascular ROS. Anesthesia Plan      MAC     ASA 3             Anesthetic plan and risks discussed with patient. Plan discussed with CRNA.                   Doug Kaufman MD   1/29/2021

## 2021-01-29 NOTE — H&P
Pain Pre-Op H&P Note    Prince Tiffanie Jama    HPI: Anil Sinclair  presents with back and leg pain. Past Medical History:   Diagnosis Date    Morbid obesity ([address] 2015       Past Surgical History:   Procedure Laterality Date    CHOLECYSTECTOMY  2000    PAIN MANAGEMENT PROCEDURE  2020    EPIDURAL STEROID INJECTION    PAIN MANAGEMENT PROCEDURE N/A 2020    EPIDURAL STEROID INJECTION performed by Kun Deras MD at [address] [facility].       Family History   Problem Relation Age of Onset    Stroke Father        No Known Allergies      Current Facility-Administered Medications:     sodium chloride flush 0.9 % injection 10 mL, 10 mL, Intravenous, 2 times per day, Morris Salazar MD    sodium chloride flush 0.9 % injection 10 mL, 10 mL, Intravenous, PRN, Morris Salazar MD    Social History     Tobacco Use    Smoking status: Former Smoker     Types: Cigarettes     Start date: 1990     Quit date: 1999     Years since quittin.0    Smokeless tobacco: Never Used   Substance Use Topics    Alcohol use: No       Review of Systems:   Focused review of systems was performed, and negative as pertinent to diagnosis, except as stated in HPI. Physical Exam  Constitutional:       Appearance: Normal appearance. Pulmonary:      Effort: Pulmonary effort is normal.   Neurological:      Mental Status: He is alert. Psychiatric:         Attention and Perception: Attention and perception normal.         Mood and Affect: Mood and affect normal.         Patient's current physical status, medications, medical history, and HPI have been reviewed and updated as appropriate on this date: 21    Risk/Benefit(s): The risks, benefits, alternatives, and potential complications have been discussed with the patient/family and informed consent has been obtained for the procedure/sedation.     Diagnosis:   RADICULOPATHY      Plan: caudal        Kun Deras

## 2021-01-29 NOTE — OP NOTE
Caudal Epidural Steroid Injection:   SURGEON: Jake Jama    PRE-OP DIAGNOSIS: Lumbar radiculopathy,  Low back pain    POST-OP DIAGNOSIS: same. PROCEDURE PERFORMED: Caudal Epidural Steroid Injection. Physician confirmed and marked the surgical site. EBL: minimal    CONSENT: Patient has undergone the educational process with this procedure, is aware and fully understands the risks involved: potential damage to any and all body organs including possible bleeding, infection and nerve injury, allergic reaction and headache. Patient also understands that the procedure will be undertaken in a safe, controlled, and monitored setting. Patient recognizes that the benefits may include relief from pain and reduction in the oral use of medications. Patient agreed to proceed. The patient was counseled at length about the risks of daina Covid-19 during their perioperative period and any recovery window from their procedure. The patient was made aware that daina Covid-19  may worsen their prognosis for recovering from their procedure  and lend to a higher morbidity and/or mortality risk. All material risks, benefits, and reasonable alternatives including postponing the procedure were discussed. The patient does wish to proceed with the procedure at this time. PREP:  Timeout was performed prior to starting the procedure. The patient was prepped with chloraprep and draped sterilely. 5ml of 0.5% lidocaine was used to anesthetize the skin and subcutaneous tissue. PROCEDURE NOTE: A 22 gauge 3.5 inch spinal needle was advanced under fluoroscopic guidance to the sacrococcygeal membrane and into the caudal epidural space. 1 ml of (omnipaque) contrast was then injected and spread along the epidural space. Aspiration was negative for blood, CSF and producing pain. 10mg Dexamethasone mixed with 5 ml of 0.5% Lidocaine was then injected.  The needle was withdrawn by the physician and the nurse applied a sterile dressing. The patient tolerated the procedure well. No complications occurred. Patient transferred to the recovery room in satisfactory condition. Appropriate written discharge instructions given to the patient.     Holly Keyes

## 2021-02-02 ENCOUNTER — OFFICE VISIT (OUTPATIENT)
Dept: ORTHOPEDIC SURGERY | Age: 66
End: 2021-02-02
Payer: COMMERCIAL

## 2021-02-02 VITALS — HEIGHT: 65 IN | WEIGHT: 256.4 LBS | BODY MASS INDEX: 42.72 KG/M2

## 2021-02-02 DIAGNOSIS — M25.562 LEFT KNEE PAIN, UNSPECIFIED CHRONICITY: Primary | ICD-10-CM

## 2021-02-02 PROCEDURE — G8417 CALC BMI ABV UP PARAM F/U: HCPCS | Performed by: ORTHOPAEDIC SURGERY

## 2021-02-02 PROCEDURE — 99211 OFF/OP EST MAY X REQ PHY/QHP: CPT | Performed by: ORTHOPAEDIC SURGERY

## 2021-02-02 PROCEDURE — G8427 DOCREV CUR MEDS BY ELIG CLIN: HCPCS | Performed by: ORTHOPAEDIC SURGERY

## 2021-02-02 NOTE — PROGRESS NOTES
Chief Complaint   Patient presents with    Follow-up     left knee pain      This patient had previously been seen by me for primary osteoarthritis of the right knee. She has also been seen for lumbar spondylosis and is attending pain management for that and getting epidural steroid injection. The patient complained of the left knee pain and this was circumferential.    Before I examined her she she said she wanted x-ray of the left knee as well as of the whole of the left leg. I told her that there was no indication for x-ray in the whole leg. She then complained that she was having some numbness and tingling in the left foot and I told her that this may be related to her spinal pathology. But she insisted on having the x-ray of the whole of the left leg. Before she could had x-rays of the left knee the patient walked out of the clinic.

## 2021-02-04 NOTE — TELEPHONE ENCOUNTER
Patient would like to start Lyrica for her pain . Can you call in medication for her?  Thank you , patient states that Dr Gambino Pean talk about it after her procedure on 1/29/21

## 2021-02-08 NOTE — TELEPHONE ENCOUNTER
She is taking them but her refill is 2/3/21 and she is stretching them but she is only taking them twice a day instead of three times a day.  She states that she has no more refills coming up and would stop taking Gabapentin to start the Lyrica

## 2021-02-11 DIAGNOSIS — M47.817 LUMBOSACRAL SPONDYLOSIS WITHOUT MYELOPATHY: ICD-10-CM

## 2021-02-11 DIAGNOSIS — M54.16 LUMBAR RADICULOPATHY: ICD-10-CM

## 2021-02-11 DIAGNOSIS — M47.817 LUMBOSACRAL SPONDYLOSIS WITHOUT MYELOPATHY: Primary | ICD-10-CM

## 2021-02-11 RX ORDER — PREGABALIN 50 MG/1
50 CAPSULE ORAL 2 TIMES DAILY
Qty: 28 CAPSULE | Refills: 0 | Status: SHIPPED | OUTPATIENT
Start: 2021-02-11 | End: 2021-02-15 | Stop reason: SDUPTHER

## 2021-02-11 RX ORDER — PREGABALIN 50 MG/1
50 CAPSULE ORAL 2 TIMES DAILY
Qty: 28 CAPSULE | Refills: 0 | Status: SHIPPED | OUTPATIENT
Start: 2021-02-11 | End: 2021-02-11 | Stop reason: SDUPTHER

## 2021-02-11 NOTE — TELEPHONE ENCOUNTER
Patient prefers to see Dr Adrienne Simon, she scehduled in his first available slot 03/04. She is wondering does she need to keep 02/15 appt with KEY Chand CNP  Or can she still get the medication if she waits for [facility] appt?     Please call pt with direction   Thank you

## 2021-02-11 NOTE — TELEPHONE ENCOUNTER
Patient called again today for Lyrica ,  She is out of gabapentin has no medication to help her with her pain . Can you order her something? She has appt. VV on 2/15/21 with Saulo Hdz NP. [place] let patient know ?  Thank you

## 2021-02-15 ENCOUNTER — VIRTUAL VISIT (OUTPATIENT)
Dept: PAIN MANAGEMENT | Age: 66
End: 2021-02-15
Payer: COMMERCIAL

## 2021-02-15 DIAGNOSIS — M47.817 LUMBOSACRAL SPONDYLOSIS WITHOUT MYELOPATHY: ICD-10-CM

## 2021-02-15 DIAGNOSIS — M54.16 LUMBAR RADICULOPATHY: ICD-10-CM

## 2021-02-15 PROCEDURE — 99212 OFFICE O/P EST SF 10 MIN: CPT | Performed by: NURSE PRACTITIONER

## 2021-02-15 ASSESSMENT — ENCOUNTER SYMPTOMS
BACK PAIN: 1
CONSTIPATION: 0
SHORTNESS OF BREATH: 0
COUGH: 0

## 2021-02-15 NOTE — PROGRESS NOTES
Patient completed a video visit today. Chief Complaint: back pain    [facility] Patient complains of low back pain that radiates down both legs. MRI with severe stenosis. She did see NS but does not want surgery at this time. She had Caudal epidural 1/29/21 with 50% relief but still has some lingering pain in the left leg. She was taking gabapentin but has requested to switch this to Lyrica. Leg Pain   The incident occurred more than 1 week ago. The pain is present in the left leg, left thigh, left knee, left foot and left ankle. The quality of the pain is described as cramping. The pain is at a severity of 6/10. The pain is moderate. The pain has been constant since onset. Associated symptoms include muscle weakness, numbness and tingling. She has tried non-weight bearing, acetaminophen, rest and ice for the symptoms. The treatment provided mild relief. Patient denies any new neurological symptoms. No bowel or bladder incontinence, no weakness, and no falling. Past Medical History:   Diagnosis Date    Morbid obesity ([facility] [address] 4/17/2015       Past Surgical History:   Procedure Laterality Date    CHOLECYSTECTOMY  1/2000    NERVE BLOCK N/A 01/29/2021    : EPIDURAL STEROID INJECTION - CAUDAL (N/A )    PAIN MANAGEMENT PROCEDURE  12/04/2020    EPIDURAL STEROID INJECTION    PAIN MANAGEMENT PROCEDURE N/A 12/4/2020    EPIDURAL STEROID INJECTION performed by Feliciano Marks MD at [address] [place] [address] N/A 1/29/2021    EPIDURAL STEROID INJECTION - CAUDAL performed by Feliciano Marks MD at [id number] [place] [address].       No Known Allergies      Current Outpatient Medications:     pregabalin (LYRICA) 50 MG capsule, Take 1 capsule by mouth 2 times daily for 14 days. , Disp: 28 capsule, Rfl: 0    vitamin D (ERGOCALCIFEROL) 1.25 MG (81096 UT) CAPS capsule, Take 50,000 Units by mouth once a week, Disp: , Rfl:     acetaminophen (TYLENOL) 325 MG tablet, Take 650 mg by mouth every 6 hours as needed for Pain, Disp: , Rfl:     Misc. Devices MISC, 1 each by Does not apply route once as needed (self directed muscle massage) Please dispense one theracane device. Massage back as tolerated to relieve muscle spasms. , Disp: 1 Device, Rfl: 0    ibuprofen (ADVIL;MOTRIN) 800 MG tablet, Take 1 tablet by mouth every 8 hours as needed for Pain (Patient not taking: Reported on 2021), Disp: 120 tablet, Rfl: 3    Omega-3 Fatty Acids (OMEGA 3 PO), Take by mouth daily , Disp: , Rfl:     Family History   Problem Relation Age of Onset    Stroke Father        Social History     Socioeconomic History    Marital status: Single     Spouse name: Not on file    Number of children: Not on file    Years of education: Not on file    Highest education level: Not on file   Occupational History    Not on file   Social Needs    Financial resource strain: Not on file    Food insecurity     Worry: Not on file     Inability: Not on file    Transportation needs     Medical: Not on file     Non-medical: Not on file   Tobacco Use    Smoking status: Former Smoker     Types: Cigarettes     Start date: 1990     Quit date: 1999     Years since quittin.1    Smokeless tobacco: Never Used   Substance and Sexual Activity    Alcohol use: No    Drug use: No    Sexual activity: Never   Lifestyle    Physical activity     Days per week: Not on file     Minutes per session: Not on file    Stress: Not on file   Relationships    Social connections     Talks on phone: Not on file     Gets together: Not on file     Attends [employer] service: Not on file     Active member of club or organization: Not on file     Attends meetings of clubs or organizations: Not on file     Relationship status: Not on file    Intimate partner violence     Fear of current or ex partner: Not on file     Emotionally abused: Not on file     Physically abused: Not on file     Forced sexual activity: Not on file   Other Topics Concern    Not on file Social History Narrative    Not on file       Review of Systems:  Review of Systems   Constitution: Negative for chills and fever. Cardiovascular: Negative for chest pain and palpitations. Respiratory: Negative for cough and shortness of breath. Musculoskeletal: Positive for back pain. Gastrointestinal: Negative for constipation. Neurological: Positive for numbness and tingling. Negative for disturbances in coordination and loss of balance. Physical Exam:  There were no vitals taken for this visit. Physical Exam  HENT:      Head: Normocephalic. Pulmonary:      Effort: Pulmonary effort is normal.   Neurological:      Mental Status: She is alert. Psychiatric:         Mood and Affect: Mood normal.         Behavior: Behavior normal.         Record/Diagnostics Review:    1. Diffuse congenital spinal canal stenosis.  Superimposed degenerative   changes further narrow the spinal canal, including severe multifactorial   spinal canal stenosis at L1-L2, L2-L3, and L3-L4.  Mass effect on the cauda   equina nerve roots at these levels. 2. Severe left L3-L4 and left L5-S1 neural foraminal stenosis. 3. Moderate right L3-L4 and right L5-S1 neural foraminal stenosis. Additional mild multilevel bilateral neural foraminal stenosis, as described   above. Assessment:  Problem List Items Addressed This Visit     Lumbosacral spondylosis without myelopathy    Relevant Medications    LYRICA 50 MG capsule (Start on 2/25/2021)    Lumbar radiculopathy    Relevant Medications    LYRICA 50 MG capsule (Start on 2/25/2021)             Treatment Plan:  LESI with moderate relief  Continues to have lingering pain in the left leg  D/C gabapentin and start Lyrica 50 mg BID - consider titration as needed  Pt considering chiropractic care  Follow up in 4 weeks    Chelsea Mi is a 60 y.o. female being evaluated by a Virtual Visit (video visit) encounter to address concerns as mentioned above.   A caregiver was present when appropriate. Due to this being a TeleHealth encounter (During SSASN-83 public health emergency), evaluation of the following organ systems was limited: Vitals/Constitutional/EENT/Resp/CV/GI//MS/Neuro/Skin/Heme-Lymph-Imm. Pursuant to the emergency declaration under the [address] [place] [address], [address] [place] [address] and the [employer] Act, this Virtual Visit was conducted with patient's (and/or legal guardian's) consent, to reduce the patient's risk of exposure to COVID-19 and provide necessary medical care. The patient (and/or legal guardian) has also been advised to contact this office for worsening conditions or problems, and seek emergency medical treatment and/or call 911 if deemed necessary. Patient identification was verified at the start of the visit: Yes    Total time spent for this encounter: Not billed by time    Services were provided through a video synchronous discussion virtually to substitute for in-person clinic visit. Patient and provider were located at their individual homes. --KEY Guerrero CNP on 2/15/2021 at 10:19 AM    An electronic signature was used to authenticate this note.

## 2021-03-04 ENCOUNTER — VIRTUAL VISIT (OUTPATIENT)
Dept: PAIN MANAGEMENT | Age: 66
End: 2021-03-04
Payer: COMMERCIAL

## 2021-03-04 DIAGNOSIS — M47.817 LUMBOSACRAL SPONDYLOSIS WITHOUT MYELOPATHY: ICD-10-CM

## 2021-03-04 DIAGNOSIS — M48.061 SPINAL STENOSIS OF LUMBAR REGION, UNSPECIFIED WHETHER NEUROGENIC CLAUDICATION PRESENT: Primary | ICD-10-CM

## 2021-03-04 PROCEDURE — 99214 OFFICE O/P EST MOD 30 MIN: CPT | Performed by: PAIN MEDICINE

## 2021-03-04 PROCEDURE — 3017F COLORECTAL CA SCREEN DOC REV: CPT | Performed by: PAIN MEDICINE

## 2021-03-04 PROCEDURE — G8428 CUR MEDS NOT DOCUMENT: HCPCS | Performed by: PAIN MEDICINE

## 2021-03-04 RX ORDER — GABAPENTIN 300 MG/1
300 CAPSULE ORAL 3 TIMES DAILY
Qty: 90 CAPSULE | Refills: 0 | Status: SHIPPED | OUTPATIENT
Start: 2021-03-04 | End: 2021-04-06

## 2021-03-04 ASSESSMENT — ENCOUNTER SYMPTOMS
BACK PAIN: 1
BOWEL INCONTINENCE: 0
COUGH: 0

## 2021-03-04 NOTE — PROGRESS NOTES
HPI:     Back Pain  This is a chronic problem. The current episode started more than 1 year ago. The problem occurs constantly. The problem is unchanged. The pain is present in the lumbar spine. The quality of the pain is described as aching. The pain is moderate. Stiffness is present all day. Associated symptoms include leg pain. Pertinent negatives include no bowel incontinence, chest pain or fever. Epidural injection x2 with some benefit, but continues to have significant back and leg pain. MRI lumbar spine with significant stenosis. Switched to Lyrica, unsure of benefit has been having some swelling from this. Wishes to go back to gabapentin. Patient denies any new neurological symptoms. Nobowel or bladder incontinence, no weakness, and no falling. Review of OARRS does not show any aberrant prescription behavior. Medication is helping the patient stay active. Patient denies any side effects and reports adequate analgesia. No sign of misuse/abuse. Past Medical History:   Diagnosis Date    Lumbar radiculopathy 2/15/2021    Lumbosacral spondylosis without myelopathy 2/15/2021    Morbid obesity ([address] 4/17/2015       Past Surgical History:   Procedure Laterality Date    CHOLECYSTECTOMY  1/2000    NERVE BLOCK N/A 01/29/2021    : EPIDURAL STEROID INJECTION - CAUDAL (N/A )    PAIN MANAGEMENT PROCEDURE  12/04/2020    EPIDURAL STEROID INJECTION    PAIN MANAGEMENT PROCEDURE N/A 12/4/2020    EPIDURAL STEROID INJECTION performed by Kelly Almendarez MD at [address] [place] [address] N/A 1/29/2021    EPIDURAL STEROID INJECTION - CAUDAL performed by Kelly Almendarez MD at [address] [place] [address].       No Known Allergies      Current Outpatient Medications:     gabapentin (NEURONTIN) 300 MG capsule, Take 1 capsule by mouth 3 times daily for 30 days. , Disp: 90 capsule, Rfl: 0    LYRICA 50 MG capsule, Take 1 capsule by mouth 2 times daily for 30 days. , Disp: 60 capsule, Rfl: 0   vitamin D (ERGOCALCIFEROL) 1.25 MG (59971 UT) CAPS capsule, Take 50,000 Units by mouth once a week, Disp: , Rfl:     acetaminophen (TYLENOL) 325 MG tablet, Take 650 mg by mouth every 6 hours as needed for Pain, Disp: , Rfl:     Misc. Devices MISC, 1 each by Does not apply route once as needed (self directed muscle massage) Please dispense one theracane device. Massage back as tolerated to relieve muscle spasms. , Disp: 1 Device, Rfl: 0    Omega-3 Fatty Acids (OMEGA 3 PO), Take by mouth daily , Disp: , Rfl:     Family History   Problem Relation Age of Onset    Stroke Father        Social History     Socioeconomic History    Marital status: Single     Spouse name: Not on file    Number of children: Not on file    Years of education: Not on file    Highest education level: Not on file   Occupational History    Not on file   Social Needs    Financial resource strain: Not on file    Food insecurity     Worry: Not on file     Inability: Not on file    Transportation needs     Medical: Not on file     Non-medical: Not on file   Tobacco Use    Smoking status: Former Smoker     Types: Cigarettes     Start date: 1990     Quit date: 1999     Years since quittin.1    Smokeless tobacco: Never Used   Substance and Sexual Activity    Alcohol use: No    Drug use: No    Sexual activity: Never   Lifestyle    Physical activity     Days per week: Not on file     Minutes per session: Not on file    Stress: Not on file   Relationships    Social connections     Talks on phone: Not on file     Gets together: Not on file     Attends Restorationist service: Not on file     Active member of club or organization: Not on file     Attends meetings of clubs or organizations: Not on file     Relationship status: Not on file    Intimate partner violence     Fear of current or ex partner: Not on file     Emotionally abused: Not on file     Physically abused: Not on file     Forced sexual activity: Not on file

## 2021-03-18 ENCOUNTER — HOSPITAL ENCOUNTER (OUTPATIENT)
Dept: PHYSICAL THERAPY | Facility: CLINIC | Age: 66
Setting detail: THERAPIES SERIES
Discharge: HOME | End: 2021-03-18
Payer: COMMERCIAL

## 2021-03-18 PROCEDURE — 97162 PT EVAL MOD COMPLEX 30 MIN: CPT

## 2021-03-18 PROCEDURE — 97110 THERAPEUTIC EXERCISES: CPT

## 2021-03-18 NOTE — CONSULTS
[place], including severe multifactorial spinal canal stenosis at L1-L2, L2-L3, and L3-L4. Mass effect on the cauda equina nerve roots at these levels. 2. Severe left L3-L4 and left L5-S1 neural foraminal stenosis. 3. Moderate right L3-L4 and right L5-S1 neural foraminal stenosis. Additional mild multilevel bilateral neural foraminal stenosis, as described above. Medications: [x] Refer to full medical record [] None [] Other:  Allergies:      [x] Refer to full medical record [] None [] Other:    Function:  Hand Dominance  [x] Right  [] Left  Patient lives with: Mother   In what type of home []  One story   [] Two story   [x] Split level   Number of stairs to enter    With handrail on the [x]  Right to enter   [] Left to enter   Bathroom has a []  Tub only  [x] Tub/shower combo   [] Walk in shower    []  Grab bars   Washing machine is on []  Main level   [] Second level   [x] Basement   Employer [employer]   Job Status []  Normal duty   [] Light duty   [] Off due to condition    []  Retired   [] Not employed   [] Disability  [] Other:  []  Return to work:    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       ADL/IADL Previous level of function Current level of function Who currently assists the patient with task   Bathing  [] Independent  [] Assist [x] Independent  [] Assist Shower chair   Dress/grooming [] Independent  [] Assist [x] Independent  [] Assist    Transfer/mobility [] Independent  [] Assist [x] Independent  [] Assist    Feeding [] Independent  [] Assist [x] Independent  [] Assist    Toileting [] Independent  [] Assist [x] Independent  [] Assist    Driving [] Independent  [] Assist [x] Independent  [] Assist    Housekeeping [] Independent  [] Assist [x] Independent  [] Assist Difficult and painful   Grocery shop/meal prep [] Independent  [] Assist [x] Independent  [] Assist Difficult and painful     Gait Prior level of function Current level of function    [] Independent  [] Assist [x] Independent  [] Assist   Device: [] Independent [x] Independent    [] Straight Cane [] Quad cane [x] Straight Cane [] Quad cane    [] Standard walker [] Rolling walker   [] 4 wheeled walker [] Standard walker [] Rolling walker   [] 4 wheeled walker    [] Wheelchair [] Wheelchair     Pain:  [x] Yes  [] No Location: left >right leg pain (thigh, knee and ankle Pain Rating: (0-10 scale) 6-7/10      Symptoms:  [] Improving [] Worsening [x] Same  Better:  [] AM    [] PM    [x] Sit    [] Rise/Sit    []Stand    [] Walk    [x] Lying    [] Other:  Worse: [] AM    [] PM    [] Sit    [x] Rise/Sit    [x]Stand    [x] Walk    [] Lying    [] Bend                      [] Valsalva    [] Other:  Sleep: [] OK    [x] Disturbed    Objective:      STRENGTH  STRENGTH  ROM    Left Right  Left Right Cervical    C5 Shld Abd   L1-2 Hip Flex 3+ 3+ Flexion    Shld Flexion   Hip Abd   Extension    Shld IR   L3-4 Knee Ext 4+ 4+ Rotation L R   Shld ER   L4 Ankle DF 5 5 Sidebend L R   C6 Elb Flex   L5 EHL 5  Retraction    C7 Elb Ext   S1 Plant. Flex 4 4 Lumbar    C8 EPL   Abdominals 3+  Flexion 100%   T1 Fing Abd   Erector Spinae 4-  Extension 50% pn         Rotation L 75% R 75%         Sidebend L 75% R 75%         UE/LE                                                              TESTS (+/-) LEFT RIGHT Not Tested   SLR [] sit [x] supine - - []   Hamstring (SLR)   []   SKTC + + []   DKTC   []   Slump/Dural   []   SI JT   []   Flexion hold 5 sec fatigue []   Bridge hold 10 sec Fatigue/ pn []   Cerv. Comp   []   Cerv. Distraction   []   Cerv. Alar/Transverse   []   Vertebral Artery   []   Adsons   []   Vernard Lipoma   []   Jonah Tests ? Pain ?  Pain No Change Not Tested   RFIS [] [] [x] []   ANNETTE [x] [] [] []   RFIL [] [x] [] []   REIL [] [] [] [x]   Rep Prot [] [] [] []   Rep Retract [] [] [] []       OBSERVATION No Deficit Deficit Not Tested Comments   Posture       Forward Head [] [] []    Rounded Shoulders [] [] []    Kyphosis [] [] []    Lordosis Demonstrates/verbalizes understanding of HEP/Ed previously given.     Treatment Plan:  [x] Therapeutic Exercise   17603  [] Iontophoresis: 4 mg/mL Dexamethasone Sodium Phosphate  mAmin  63869   [x] Therapeutic Activity  68788 [] Vasopneumatic cold with compression  30569    [] Gait Training   57835 [] Ultrasound   24296   [x] Neuromuscular Re-education  43637 [x] Electrical Stimulation Unattended  38730   [x] Manual Therapy  05812 [] Electrical Stimulation Attended  87893   [x] Instruction in HEP  [] Lumbar/Cervical Traction  70850   [] Aquatic Therapy   05053 [x] Cold/hotpack    [] Massage   42819      [x] Dry Needling, 1 or 2 muscles  80154   [] Biofeedback, first 15 minutes   91325  [] Biofeedback, additional 15 minutes   00506 [x] Dry Needling, 3 or more muscles  01405       Frequency:  2 x/week for 16 visits    Todays Treatment:  Modalities:   Precautions: Flexion directional preference  Exercises:  Exercise Reps/ Time Weight/ Level Comments HEP   LTR 2x10   x   PPT 2x10   x   PPT with march 10   x   PPT with single leg bicycle 8      Nu-step 5 min  L4     Other:    Specific Instructions for next treatment: Progress pain free flexion based core program, modalities and MT PRN      Evaluation Complexity:  History (Personal factors, comorbidities) [] 0 [x] 1-2 [] 3+   Exam (limitations, restrictions) [] 1-2 [x] 3 [] 4+   Clinical presentation (progression) [] Stable [x] Evolving  [] Unstable   Decision Making [] Low [x] Moderate [] High    [] Low Complexity [] Moderate Complexity [] High Complexity       Treatment Charges: Mins Units   [x] Evaluation       []  Low       [x]  Moderate       []  High 25 1   []  Modalities     [x]  Ther Exercise 15 1   []  Manual Therapy     []  Ther Activities     []  Aquatics     []  Vasocompression     []  Other       TOTAL TREATMENT TIME: 40    Time in: 2:50pm      Time out: 3:35pm    Electronically signed by: Fredi Badillo PT        Physician Signature:________________________________Date:__________________  By signing above or cosigning this note, I have reviewed this plan of care and certify a need for medically necessary rehabilitation services.      *PLEASE SIGN ABOVE AND FAX BACK ALL PAGES*

## 2021-03-26 ENCOUNTER — HOSPITAL ENCOUNTER (OUTPATIENT)
Dept: PHYSICAL THERAPY | Facility: CLINIC | Age: 66
Setting detail: THERAPIES SERIES
Discharge: HOME | End: 2021-03-26
Payer: COMMERCIAL

## 2021-03-26 PROCEDURE — 97110 THERAPEUTIC EXERCISES: CPT

## 2021-03-26 NOTE — FLOWSHEET NOTE
[] [address]  [address]  [phone]  F: [phone] [] [address]  [address] [address]   [address]  P: [phone]  F: [phone] [] [place]  [address]  P: [phone]  F: [phone] [x] [address]  P: [phone]  F: [phone] [] [address]  [facility]   [address]   [place]: [phone]  F: [phone]      Physical Therapy Daily Treatment Note    Date:  3/26/2021  Patient Name:  Nathaniel Viramontes    :    MRN: [id number]  Physician: Duong Ashby MD                 Insurance: John Paul Hives ($70 co-pay)  Medical Diagnosis: Lumbar spinal stenosis               Rehab Codes: M48.061  Onset Date: 2019                    Next 's appt. : 2021  Visit# / total visits:      Cancels/No Shows: 0/0    Subjective:    Pain:  [x] Yes  [] No Location: L LE    Pain Rating: (0-10 scale) 4-5/10  Pain altered Tx:  [x] No  [] Yes  Action:  Comments: Pt reports L> R LE pain persists with no sig relief from last visit, pt also states no increased pain after last visit also.      Objective:  Modalities:   Precautions: Flexion directional preference  Exercises:  Exercise Reps/ Time Weight/ Level Comments HEP   Nu-step  6' L4  x   LTR 2x10    x   PPT 2x10  5\" hold    x   PPT with march 2x10     x   PPT with single leg bicycle 2x10     x   Curl up  2x10   x   SKC 5x5\" hold   x   DKC 5x5\" hold  PTA min A  x   Lumbar stretch  3x5\" hold Red PB   x   SB gastroc stretch  3x30\"   x   Stool HS stretch  3x30\"   x   Other:     Specific Instructions for next treatment: Progress pain free flexion based core program, modalities and MT PRN    Treatment Charges: Mins Units   []  Modalities     [x] Ther Exercise 40 3   []  Manual Therapy     []  Ther Activities     []  Aquatics     []  Vasocompression     []  Other     Total Treatment time 40 3       Assessment: [x] Progressing toward goals. Initiated tx on Nustep x6' with good usha. Able to progress pt with incr reps with PPT with marches and SL bicycles. Added supine SKC and DKC, pt req min A by PTA for DKC to hold. Also added seated lumbar flexion stretches as well as standing HS and gastroc stretches with good usha. Added HS and gastroc stretch to HEP with verbal understating given by pt. Pt denies incr pain after tx.     [] No change. [] Other:  [x] Patient would continue to benefit from skilled physical therapy services in order to: decr pain, incr ROM, incr strength to improve overall function. STG/LTG  STG: (to be met in 6 treatments)  1. ? Pain: 4/10  2. Patient to be independent with home exercise program as demonstrated by performance with correct form without cues.     LTG: (to be met in 16 treatments)  1. Patient will improve Oswestry by > 10%  2. Patient will ascend and descend stairs step over step. 3. Patient will walk greater than 10 minutes. 4. Patient will be independent in final HEP.       Patient goals:Reduce tension and pain in legs    Pt. Education:  [x] Yes  [] No  [] Reviewed Prior HEP/Ed  Method of Education: [x] Verbal  [] Demo  [x] Written  Comprehension of Education:  [x] Verbalizes understanding. [x] Demonstrates understanding. [x] Needs review. [x] Demonstrates/verbalizes HEP/Ed previously given. Plan: [x] Continue current frequency toward long and short term goals.     [] Specific Instructions for subsequent treatments:     Frequency:  2 x/week for 16 visits      Time In: 3:00pm            Time Out: 3:45pm    Electronically signed by:  Carson Caba PTA

## 2021-03-29 ENCOUNTER — TELEPHONE (OUTPATIENT)
Dept: PAIN MANAGEMENT | Age: 66
End: 2021-03-29

## 2021-03-29 NOTE — TELEPHONE ENCOUNTER
Patient called stating that the she want to stop the gabapentin and switch back to lyrica. She states the gabapentin is not working. She already stop the gabapentin abs used the remaining lyrica she had left.

## 2021-03-30 ENCOUNTER — HOSPITAL ENCOUNTER (OUTPATIENT)
Dept: PHYSICAL THERAPY | Facility: CLINIC | Age: 66
Setting detail: THERAPIES SERIES
Discharge: HOME | End: 2021-03-30
Payer: COMMERCIAL

## 2021-03-30 PROCEDURE — 97110 THERAPEUTIC EXERCISES: CPT

## 2021-03-30 NOTE — FLOWSHEET NOTE
[] [address]  [address]  [phone]  F: [phone] [] [address]  [facility]   [address]  P: [phone]  F: [phone] [] [place]  [address] [facility]  P: [phone]  F: [phone] [x] [address]  P: [phone]  F: [phone] [] [address]  [facility]   [address]   [place]: [phone]  F: [phone]      Physical Therapy Daily Treatment Note    Date:  3/30/2021  Patient Name:  Regulo Rodriguez    :    MRN: [id number]  Physician: Americo Lopez MD                 Insurance: Squid [employer]il ($77 co-pay)  Medical Diagnosis: Lumbar spinal stenosis               Rehab Codes: M48.061  Onset Date: 2019                    Next 's appt. : 2021  Visit# / total visits: 3/16     Cancels/No Shows: 0/0    Subjective:    Pain:  [x] Yes  [] No Location: L LE    Pain Rating: (0-10 scale) 7-8/10  Pain altered Tx:  [x] No  [] Yes  Action:  Comments: Pt reports incr pain and soreness after last visit but that she had a  before PT, and had to get her hair done after and an 8 hour class on Saturday; which was a lot for her that weekend. Pt also states that she is out of Lyrica so she has been taking Gabapentin, which does not work as well, according to pt.      Objective:  Modalities:   Precautions: Flexion directional preference  Exercises:  Exercise Reps/ Time Weight/ Level Comments HEP   Nu-step  8' L4  x   LTR 2x10    x   LTR with PB  2' Green PB  x   PPT 15x  5\" hold    x   PPT with march 15x     x   PPT with single leg bicycle 15x    keep kick high  x   Curl up  15x   x   Seated march  10x ea   x   SKC 5x5\" hold   x   DKC 5x5\" hold  PTA min A  -   Lumbar stretch  3x5\" hold Red PB   x   SB gastroc stretch  3x30\"   x   Stool HS stretch  3x30\"   x   Other:     Specific Instructions for next treatment: Progress pain free flexion based core program, modalities and MT PRN    Treatment Charges: Mins Units   []  Modalities     [x]  Ther Exercise 40 3   []  Manual Therapy     []  Ther Activities     []  Aquatics     []  Vasocompression     []  Other     Total Treatment time 40 3       Assessment: [x] Progressing toward goals. Initiated tx with mat exercises; decreased reps d/t c/o incr pain after last visit. Pt denies pain throughout mat exercises. Added seated hip flexion for strengthening with good usha. Pt then cont with lumbar, B HS and B gastroc stretches with good usha. Ended with Nustep, able to incr duration to 8' with good usha. Pt reports feeling \"good\" after treatment this date. [] No change. [] Other:  [x] Patient would continue to benefit from skilled physical therapy services in order to: decr pain, incr ROM, incr strength to improve overall function. STG/LTG  STG: (to be met in 6 treatments)  1. ? Pain: 4/10  2. Patient to be independent with home exercise program as demonstrated by performance with correct form without cues.     LTG: (to be met in 16 treatments)  1. Patient will improve Oswestry by > 10%  2. Patient will ascend and descend stairs step over step. 3. Patient will walk greater than 10 minutes. 4. Patient will be independent in final HEP.       Patient goals:Reduce tension and pain in legs    Pt. Education:  [x] Yes  [] No  [] Reviewed Prior HEP/Ed  Method of Education: [x] Verbal  [] Demo  [x] Written  Comprehension of Education:  [x] Verbalizes understanding. [x] Demonstrates understanding. [x] Needs review. [x] Demonstrates/verbalizes HEP/Ed previously given. Plan: [x] Continue current frequency toward long and short term goals.     [] Specific Instructions for subsequent treatments:     Frequency:  2 x/week for 16 visits      Time In: 1:30pm            Time Out: 2:15pm    Electronically signed by:  Carson Caba PTA

## 2021-03-31 ASSESSMENT — ENCOUNTER SYMPTOMS
BOWEL INCONTINENCE: 0
BACK PAIN: 1
ABDOMINAL PAIN: 1

## 2021-03-31 NOTE — PROGRESS NOTES
been constant since onset. Associated symptoms include a loss of sensation, muscle weakness, numbness and tingling. Pertinent negatives include no inability to bear weight or loss of motion. She reports no foreign bodies present. The symptoms are aggravated by movement. She has tried heat (PT) for the symptoms. The treatment provided mild relief. Patient denies any new neurological symptoms. No bowel or bladder incontinence, no weakness, and no falling. Review of OARRS does not show any aberrant prescription behavior. Medication is helping the patient stay active. Patient denies any side effects and reports adequate analgesia. No sign of misuse/abuse. Past Medical History:   Diagnosis Date    Lumbar radiculopathy 2/15/2021    Lumbosacral spondylosis without myelopathy 2/15/2021    Morbid obesity ([address] 4/17/2015       Past Surgical History:   Procedure Laterality Date    CHOLECYSTECTOMY  1/2000    NERVE BLOCK N/A 01/29/2021    : EPIDURAL STEROID INJECTION - CAUDAL (N/A )    PAIN MANAGEMENT PROCEDURE  12/04/2020    EPIDURAL STEROID INJECTION    PAIN MANAGEMENT PROCEDURE N/A 12/4/2020    EPIDURAL STEROID INJECTION performed by Roni Mi MD at [address] [place] [address] N/A 1/29/2021    EPIDURAL STEROID INJECTION - CAUDAL performed by Roni Mi MD at [address] [place] [address].       No Known Allergies      Current Outpatient Medications:     vitamin D (ERGOCALCIFEROL) 1.25 MG (38038 UT) CAPS capsule, Take 50,000 Units by mouth once a week, Disp: , Rfl:     acetaminophen (TYLENOL) 325 MG tablet, Take 650 mg by mouth every 6 hours as needed for Pain, Disp: , Rfl:     Omega-3 Fatty Acids (OMEGA 3 PO), Take by mouth daily , Disp: , Rfl:     gabapentin (NEURONTIN) 300 MG capsule, Take 1 capsule by mouth 3 times daily for 30 days. (Patient not taking: Reported on 3/31/2021), Disp: 90 capsule, Rfl: 0    LYRICA 50 MG capsule, Take 1 capsule by mouth 2 times daily for 30 days. present when appropriate. The patient was located in a state where the provider was credentialed to provide care. Total time spent for this encounter: Not billed by time    --KEY Lee CNP on 4/8/2021 at 3:01 PM    An electronic signature was used to authenticate this note.

## 2021-04-06 ENCOUNTER — OFFICE VISIT (OUTPATIENT)
Dept: ORTHOPEDIC SURGERY | Age: 66
End: 2021-04-06
Payer: COMMERCIAL

## 2021-04-06 ENCOUNTER — APPOINTMENT (OUTPATIENT)
Dept: PHYSICAL THERAPY | Facility: CLINIC | Age: 66
End: 2021-04-06
Payer: COMMERCIAL

## 2021-04-06 VITALS — WEIGHT: 256 LBS | BODY MASS INDEX: 42.65 KG/M2 | TEMPERATURE: 97.2 F | HEIGHT: 65 IN

## 2021-04-06 DIAGNOSIS — M47.817 LUMBOSACRAL SPONDYLOSIS WITHOUT MYELOPATHY: Primary | ICD-10-CM

## 2021-04-06 DIAGNOSIS — M17.12 PRIMARY OSTEOARTHRITIS OF LEFT KNEE: ICD-10-CM

## 2021-04-06 DIAGNOSIS — M48.061 SPINAL STENOSIS OF LUMBAR REGION, UNSPECIFIED WHETHER NEUROGENIC CLAUDICATION PRESENT: ICD-10-CM

## 2021-04-06 DIAGNOSIS — M25.552 LEFT HIP PAIN: ICD-10-CM

## 2021-04-06 PROCEDURE — G8417 CALC BMI ABV UP PARAM F/U: HCPCS | Performed by: PHYSICIAN ASSISTANT

## 2021-04-06 PROCEDURE — 3017F COLORECTAL CA SCREEN DOC REV: CPT | Performed by: PHYSICIAN ASSISTANT

## 2021-04-06 PROCEDURE — G8427 DOCREV CUR MEDS BY ELIG CLIN: HCPCS | Performed by: PHYSICIAN ASSISTANT

## 2021-04-06 PROCEDURE — 99204 OFFICE O/P NEW MOD 45 MIN: CPT | Performed by: PHYSICIAN ASSISTANT

## 2021-04-06 PROCEDURE — 1036F TOBACCO NON-USER: CPT | Performed by: PHYSICIAN ASSISTANT

## 2021-04-06 RX ORDER — MELOXICAM 15 MG/1
15 TABLET ORAL DAILY
Qty: 30 TABLET | Refills: 0 | Status: SHIPPED | OUTPATIENT
Start: 2021-04-06 | End: 2021-11-18

## 2021-04-06 NOTE — PROGRESS NOTES
[facility], [address] [place], [address]               [place], [address]           Dept Phone: [phone]           Dept Fax:  [phone] [address] [facility]           [place]ar Billy          Dept Phone: [phone]           Dept Fax:  [phone]      Chief Compliant:  Chief Complaint   Patient presents with    New Patient     LT leg pain from hip to foot        History of Present Illness: This is a 60 y.o. female who presents to the clinic today for evaluation of left-sided low back pain and left leg pain. Pain is chronic has been present for over a year. Patient states pain starts in the posterior left hip and radiates down the lateral aspect of left thigh all the way in the foot. She also has quite a bit of pain to the anterior thigh muscles. She reports she has significant pain in the left hip and left knee however the all the pain seems to be \"connected \". She reports numbness and tingling in the left lower extremity in the same distribution. She denies any new falls or trauma. Patient has known significant lumbar stenosis seen on MRI in September 2020 and has seen a neurosurgeon in the past for this who offered her surgical intervention however patient had no interest in at this time. Patient has tried physical therapy which she recently restarted 2 weeks ago. As well as lumbar epidural steroid injections. Most recent injection was done on 1/29/2021 with Dr. Anusha Zamarripa. Patient believes that the epidural steroid injections did improve her balance and strength with the low back however did not resolve any of her pain unfortunately. Patient presents today for continued pain and further discussion of treatment options.     Past History:    Current Outpatient Medications:     vitamin D (ERGOCALCIFEROL) 1.25 MG (14235 UT) CAPS capsule, Take 50,000 Units by mouth once a week, Disp: , Rfl:     acetaminophen (TYLENOL) 325 MG tablet, Take 650 mg by mouth every 6 hours as needed for Pain, Disp: , Rfl:     Omega-3 Fatty Acids (OMEGA 3 PO), Take by mouth daily , Disp: , Rfl:   No Known Allergies  Social History     Socioeconomic History    Marital status: Single     Spouse name: Not on file    Number of children: Not on file    Years of education: Not on file    Highest education level: Not on file   Occupational History    Not on file   Social Needs    Financial resource strain: Not on file    Food insecurity     Worry: Not on file     Inability: Not on file    Transportation needs     Medical: Not on file     Non-medical: Not on file   Tobacco Use    Smoking status: Former Smoker     Types: Cigarettes     Start date: 1990     Quit date: 1999     Years since quittin.2    Smokeless tobacco: Never Used   Substance and Sexual Activity    Alcohol use: No    Drug use: No    Sexual activity: Never   Lifestyle    Physical activity     Days per week: Not on file     Minutes per session: Not on file    Stress: Not on file   Relationships    Social connections     Talks on phone: Not on file     Gets together: Not on file     Attends Jehovah's witness service: Not on file     Active member of club or organization: Not on file     Attends meetings of clubs or organizations: Not on file     Relationship status: Not on file    Intimate partner violence     Fear of current or ex partner: Not on file     Emotionally abused: Not on file     Physically abused: Not on file     Forced sexual activity: Not on file   Other Topics Concern    Not on file   Social History Narrative    Not on file     Past Medical History:   Diagnosis Date    Lumbar radiculopathy 2/15/2021    Lumbosacral spondylosis without myelopathy 2/15/2021    Morbid obesity ([facility] [address] 2015     Past Surgical History:   Procedure Laterality Date    CHOLECYSTECTOMY  2000    NERVE BLOCK N/A 01/29/2021    : EPIDURAL STEROID INJECTION - CAUDAL (N/A )    PAIN MANAGEMENT PROCEDURE  12/04/2020    EPIDURAL STEROID INJECTION    PAIN MANAGEMENT PROCEDURE N/A 12/4/2020    EPIDURAL STEROID INJECTION performed by Cristobal Parosns MD at [address] [facility] N/A 1/29/2021    EPIDURAL STEROID INJECTION - CAUDAL performed by Cristobal Parsons MD at [address].     Family History   Problem Relation Age of Onset    Stroke Father         Review of Systems   Constitutional: Negative for fever, chills, sweats. Eyes: Negative for changes in vision, or pain. HENT: Negative for ear ache, epistaxis, or sore throat. Respiratory/Cardio: Negative for Chest pain, palpitations, SOB, or cough. Gastrointestinal: Negative for abdominal pain, N/V/D. Genitourinary: Negative for dysuria, frequency, urgency, or hematuria. Neurological: Negative for headache, numbness, or weakness. Integumentary: Negative for rash, itching, laceration, or abrasion. Musculoskeletal: Positive for New Patient (LT leg pain from hip to foot)       Physical Exam:  Constitutional: Patient is oriented to person, place, and time. Patient appears well-developed and well nourished. HENT: Negative otherwise noted  Head: Normocephalic and Atraumatic  Nose: Normal  Eyes: Conjunctivae and EOM are normal  Neck: Normal range of motion Neck supple. Respiratory/Cardio: Effort normal. No respiratory distress. Musculoskeletal:    LUMBAR/SACRAL EXAMINATION:  · Inspection: Local inspection shows no step-off or bruising. Lumbar alignment is normal.  Sagittal and Coronal balance is neutral.      · Palpation:   No evidence of tenderness at the midline. No tenderness bilaterally at the paraspinal or trochanters. There is no step-off or paraspinal spasm. · Range of Motion: Lumbar flexion, extension and rotation are mildly limited due to pain. · Strength:   Strength testing is 5/5 in all muscle groups tested. deficit. Skin: Skin is warm and dry  Psychiatric: Behavior is normal. Thought content normal.  Nursing note and vitals reviewed. Labs and Imaging:     XR taken today:  No results found. Previous Imagin/10/2021 MRI Lumbar spine  Impression   1. Diffuse congenital spinal canal stenosis.  Superimposed degenerative   changes further narrow the spinal canal, including severe multifactorial   spinal canal stenosis at L1-L2, L2-L3, and L3-L4.  Mass effect on the cauda   equina nerve roots at these levels. 2. Severe left L3-L4 and left L5-S1 neural foraminal stenosis. 3. Moderate right L3-L4 and right L5-S1 neural foraminal stenosis. Additional mild multilevel bilateral neural foraminal stenosis, as described   above. X-rays taken in clinic today and preliminarily reviewed by me:  Narrative   X-rays taken in clinic today preliminarily reviewed by me   AP bilateral knees standing demonstrate severe bicompartmental    degenerative changes to both knees significant narrowing of the lateral    compartment bilaterally right greater than left knee. Narrative   X-rays taken in clinic today preliminarily reviewed by me   AP pelvis standing demonstrates mild degenerative changes with some joint    space narrowing right slightly greater than left hip.  No evidence of    acute fracture seen bilaterally. Orders Placed This Encounter   Procedures    XR PELVIS (1-2 VIEWS)     Standing Status:   Future     Standing Expiration Date:   2022    XR KNEE BILATERAL STANDING     Standing Status:   Future     Standing Expiration Date:   2022       Assessment and Plan:  1. Left hip pain    2. Primary osteoarthritis of left knee          PLAN:  This is a 60 y.o. female who presents to the clinic today for evaluation of left-sided low back and left leg pain consistent with lumbar stenosis and lumbar radiculopathy.   Patient does continue of left hip and left knee pain does have some mild degenerative changes however the majority of patient's pain generator seems to be stemming from her lumbar stenosis. Patient is educated that she has severe arthritis in her low back as well as significant stenosis which is likely contributing to the radicular symptoms down the left leg. I do recommend continuing with physical therapy as well as possible repeat epidural steroid injections. For further evaluation of this radicular symptoms patient may benefit from an EMG which is ordered today. Patient is educated that she may be a candidate for lumbar surgical intervention however patient has no interest in pursuing any surgery for the back at this time. With that being said we will continue with conservative management the nature of physical therapy and lumbar epidural steroid injections. Patient also may benefit from starting on a daily NSAID i.e. meloxicam which electronic sent to patient pharmacy. Recommend follow-up after EMG is obtained        Please note that this chart was generated using voice recognition Dragon dictation software. Although every effort was made to ensure the accuracy of this automated transcription, some errors in transcription may have occurred.

## 2021-04-07 ENCOUNTER — HOSPITAL ENCOUNTER (OUTPATIENT)
Dept: PHYSICAL THERAPY | Facility: CLINIC | Age: 66
Setting detail: THERAPIES SERIES
Discharge: HOME | End: 2021-04-07
Payer: COMMERCIAL

## 2021-04-07 NOTE — FLOWSHEET NOTE
[] [facility]) - [facility]  [facility]  [address]    [phone]  F: [phone]   [] [address]  [facility]   [address]  P: [phone]  F: [phone]  [] [address]  Therapy  [address] [facility]  P: [phone]  F: [phone] [] [address]  P: [phone]  F: [phone]  [] [address]  [facility]   [address]   [place]: [phone]  F: [phone]   [] [address]  [place]: [phone]   F: [phone]     Physical Therapy Cancel/No Show note    Date: 2021  Patient: Kellen Leger  : 1955  MRN: [id number]    Cancels/No Shows to date:     For today's appointment patient:    [x]  Cancelled    [] Rescheduled appointment    [] No-show     Reason given by patient:    []  Patient ill    []  Conflicting appointment    [] No transportation      [] Conflict with work    [x] No reason given    [] Weather related    [] [id number]    [] Other:      Comments:        [x] Next appointment was confirmed for     Electronically signed by: Lisy Pickering PT

## 2021-04-08 ENCOUNTER — VIRTUAL VISIT (OUTPATIENT)
Dept: PAIN MANAGEMENT | Age: 66
End: 2021-04-08
Payer: COMMERCIAL

## 2021-04-08 DIAGNOSIS — M47.817 LUMBOSACRAL SPONDYLOSIS WITHOUT MYELOPATHY: ICD-10-CM

## 2021-04-08 DIAGNOSIS — M54.16 LUMBAR RADICULOPATHY: ICD-10-CM

## 2021-04-08 PROCEDURE — 99422 OL DIG E/M SVC 11-20 MIN: CPT | Performed by: NURSE PRACTITIONER

## 2021-04-09 ENCOUNTER — HOSPITAL ENCOUNTER (OUTPATIENT)
Dept: PHYSICAL THERAPY | Facility: CLINIC | Age: 66
Setting detail: THERAPIES SERIES
Discharge: HOME | End: 2021-04-09
Payer: COMMERCIAL

## 2021-04-09 PROCEDURE — 97110 THERAPEUTIC EXERCISES: CPT

## 2021-04-09 NOTE — FLOWSHEET NOTE
[] [address]  [address]  [phone]  F: [phone] [] [address]  [address]   [address]  P: [phone]  F: [phone] [] [place]  [address]  P: [phone]  F: [phone] [x] [address]  P: [phone]  F: [phone] [] [address]  [facility]   [address]   [place]: [phone]  F: [phone]      Physical Therapy Daily Treatment Note    Date:  2021  Patient Name:  Dylan Lemus    :  [birth year]  MRN: [id number]  Physician: Chris Alonso MD                 Insurance: Lilli Lightning ($61 co-pay)  Medical Diagnosis: Lumbar spinal stenosis               Rehab Codes: M48.061  Onset Date: 2019                    Next 's appt. : 2021  Visit# / total visits:      Cancels/No Shows: 0/0    Subjective:    Pain:  [x] Yes  [] No Location: L LE    Pain Rating: (0-10 scale) 5-6/10  Pain altered Tx:  [x] No  [] Yes  Action:  Comments: Pt reports continued low back and hip pain with standing and walking. She has continued bilateral Achilles pain with the right being worse than the left.     Objective:  Modalities:   Precautions: Flexion directional preference  Exercises:  Exercise Reps/ Time Weight/ Level Comments HEP   Nu-step  8' L4  x   LTR 2x10    x   LTR with PB   Green PB  x   PPT 2x15  5\" hold    x   PPT with march 2x10     x   PPT with single leg bicycle 15x    knee pain x   PPT with SLR 2x10      Curl up  2x10   x   Seated march  10x ea   x   SKC    x   DKC   PTA min A  -   Lumbar stretch  5x5\" hold Red PB   x   SB gastroc stretch  3x30\"   x   Seated HR 20      Standing HR 10                    Stool HS stretch     x   Other:     Specific Instructions for next treatment: Progress pain free flexion based core program, modalities and MT PRN    Treatment Charges: Mins Units   []  Modalities     [x]  Ther Exercise 40 3   []  Manual Therapy     []  Ther Activities     []  Aquatics     []  Vasocompression     []  Other     Total Treatment time 40 3       Assessment: [x] Progressing toward goals. Initiated tx with mat exercises followed by Nu-step, lumbar flexion stretching, calf stretching and seated HR. Pt continues to get relief with flexion based motion. [] No change. [] Other:  [x] Patient would continue to benefit from skilled physical therapy services in order to: decr pain, incr ROM, incr strength to improve overall function. STG/LTG  STG: (to be met in 6 treatments)  1. ? Pain: 4/10  2. Patient to be independent with home exercise program as demonstrated by performance with correct form without cues.     LTG: (to be met in 16 treatments)  1. Patient will improve Oswestry by > 10%  2. Patient will ascend and descend stairs step over step. 3. Patient will walk greater than 10 minutes. 4. Patient will be independent in final HEP.       Patient goals:Reduce tension and pain in legs    Pt. Education:  [x] Yes  [] No  [] Reviewed Prior HEP/Ed  Method of Education: [x] Verbal  [] Demo  [x] Written  Comprehension of Education:  [x] Verbalizes understanding. [x] Demonstrates understanding. [x] Needs review. [x] Demonstrates/verbalizes HEP/Ed previously given. Plan: [x] Continue current frequency toward long and short term goals.     [] Specific Instructions for subsequent treatments:     Frequency:  2 x/week for 16 visits      Time In: 2:15pm            Time Out: 3:00pm    Electronically signed by:  Magdalena Carmichael, PT

## 2021-04-13 ENCOUNTER — HOSPITAL ENCOUNTER (OUTPATIENT)
Dept: PHYSICAL THERAPY | Facility: CLINIC | Age: 66
Setting detail: THERAPIES SERIES
Discharge: HOME | End: 2021-04-13
Payer: COMMERCIAL

## 2021-04-13 ENCOUNTER — TELEPHONE (OUTPATIENT)
Dept: PAIN MANAGEMENT | Age: 66
End: 2021-04-13

## 2021-04-13 DIAGNOSIS — M54.16 LUMBAR RADICULOPATHY: Primary | ICD-10-CM

## 2021-04-13 DIAGNOSIS — M47.817 LUMBOSACRAL SPONDYLOSIS WITHOUT MYELOPATHY: ICD-10-CM

## 2021-04-13 PROCEDURE — 97110 THERAPEUTIC EXERCISES: CPT

## 2021-04-13 RX ORDER — PREGABALIN 50 MG/1
50 CAPSULE ORAL 2 TIMES DAILY
Qty: 60 CAPSULE | Refills: 0 | Status: SHIPPED | OUTPATIENT
Start: 2021-04-13 | End: 2021-05-06 | Stop reason: SDUPTHER

## 2021-04-13 NOTE — FLOWSHEET NOTE
[] [address]  [address]  [phone]  F: [phone] [] [address]  [address]  P: [phone]  F: [phone] [] [place]  [address]  P: [phone]  F: [phone] [x] [address]  P: [phone]  F: [phone] [] [address]  [facility]   [address]   [place]: [phone]  F: [phone]      Physical Therapy Daily Treatment Note    Date:  2021  Patient Name:  Benja Esquivel    :  [birth year]  MRN: [id number]  Physician: Sal Palacios MD                 Insurance: [employer] ($58 co-pay)  Medical Diagnosis: Lumbar spinal stenosis               Rehab Codes: M48.061  Onset Date: 2019                    Next 's appt. : 2021  Visit# / total visits:      Cancels/No Shows: 0/0    Subjective:    Pain:  [x] Yes  [] No Location: L LE    Pain Rating: (0-10 scale) 5-6/10  Pain altered Tx:  [x] No  [] Yes  Action:  Comments: Pt continues to complain of LBP, hip, and B ankle (R >L) pain upon arrival.     Objective:  Modalities:   Precautions: Flexion directional preference  Exercises:  Exercise Reps/ Time Weight/ Level Comments HEP   Nu-step  8' L4  x   LTR 20x    x   LTR with PB   Green PB  -   PPT 2x15  5\" hold    x   PPT with march 2x10     x   PPT with single leg bicycle 2x10    L knee pain; higher kick  x   PPT with SLR 2x10   x   Curl up  2x10   x   Seated march  10x ea   x   SKC 5\"x5  With towel  x   DKC   PTA min A  -   Lumbar stretch  5x5\" hold Red PB   x   SB gastroc stretch  3x30\"   x   Seated HR 20x   x   Standing HR 2x10                    Stool HS stretch     x   Other:     Specific Instructions for next treatment: Progress pain free flexion based core program, modalities and MT PRN    Treatment Charges: Mins Units   []  Modalities     [x]  Ther Exercise 40 3   []  Manual Therapy     []  Ther Activities     []  Aquatics     []  Vasocompression     []  Other     Total Treatment time 40 3       Assessment: [x] Progressing toward goals. Initiated tx with Nustep x8' pt reports L LE is a little more irritated after. Pt then cont with flexion based core strengthening exercises on mat. Pt cont with lumbar flex stretch with good usha. Pt c/o difficulty with standing heel raises d/t weakness. Pt reports overall no significant relief since coming to physical therapy, pt requests next visit to be her last visit d/t no changes in pain. [] No change. [] Other:  [x] Patient would continue to benefit from skilled physical therapy services in order to: decr pain, incr ROM, incr strength to improve overall function. STG/LTG  STG: (to be met in 6 treatments)  1. ? Pain: 4/10  2. Patient to be independent with home exercise program as demonstrated by performance with correct form without cues.     LTG: (to be met in 16 treatments)  1. Patient will improve Oswestry by > 10%  2. Patient will ascend and descend stairs step over step. 3. Patient will walk greater than 10 minutes. 4. Patient will be independent in final HEP.       Patient goals:Reduce tension and pain in legs    Pt. Education:  [x] Yes  [] No  [] Reviewed Prior HEP/Ed  Method of Education: [x] Verbal  [] Demo  [x] Written  Comprehension of Education:  [x] Verbalizes understanding. [x] Demonstrates understanding. [x] Needs review. [x] Demonstrates/verbalizes HEP/Ed previously given. Plan: [x] Continue current frequency toward long and short term goals. Possible D/C next visit.   [] Specific Instructions for subsequent treatments:     Frequency:  2 x/week for 16 visits      Time In: 3:00pm            Time Out: 3:45pm    Electronically signed by:  Teresa Oviedo PTA

## 2021-04-13 NOTE — TELEPHONE ENCOUNTER
Patient had called in stating that you wrote her a script for Lyrica but she cannot afford, asking if you could please send in a script for the generic. Pharmacy is [place] as seen in chart. Patients she is out of medications.

## 2021-04-15 ENCOUNTER — HOSPITAL ENCOUNTER (OUTPATIENT)
Dept: PHYSICAL THERAPY | Facility: CLINIC | Age: 66
Setting detail: THERAPIES SERIES
Discharge: HOME | End: 2021-04-15
Payer: COMMERCIAL

## 2021-04-15 PROCEDURE — 97110 THERAPEUTIC EXERCISES: CPT

## 2021-04-15 NOTE — FLOWSHEET NOTE
[] [address]  [address]  [phone]  F: [phone] [] [address]  [facility]   [address]  P: [phone]  F: [phone] [] [place]  [address]  P: [phone]  F: [phone] [x] [address]  P: [phone]  F: [phone] [] [address]  [facility]   [address]   [place]: [phone]  F: [phone]      Physical Therapy Daily Treatment Note    Date:  4/15/2021  Patient Name:  Jennifer Bell    :    MRN: [id number]  Physician: Fiordaliza Damico MD                 Insurance: [employer] ($32 co-pay)  Medical Diagnosis: Lumbar spinal stenosis               Rehab Codes: M48.061  Onset Date: 2019                    Next 's appt. : 2021  Visit# / total visits:      Cancels/No Shows: 0/0    Subjective:    Pain:  [x] Yes  [] No Location: L LE    Pain Rating: (0-10 scale) 4/10  Pain altered Tx:  [x] No  [] Yes  Action:  Comments: Pt reports taking Tylenol on top of her other medication today which may have decreased her pain levels today.  Pt reports performing HEP 2x/wk, PTA advised to attempt to perform 3-4x/wk    Objective:  Modalities:   Precautions: Flexion directional preference  Exercises:  Exercise Reps/ Time Weight/ Level Comments HEP   Nu-step  8' L4  x   LTR 20x    x   LTR with PB   Green PB  -   PPT 10x 10\"   x   PPT with march 2x10     x   PPT with single leg bicycle 2x10    L knee pain; higher kick  x   PPT with SLR 2x10   x   Curl up  2x10   x   Seated march  10x ea   x   SKC 5\"x5  With towel  x   DKC   PTA min A  -   Lumbar stretch  5x5\" hold Red PB   x   SB gastroc stretch  3x30\"   x   Seated HR 20x   x   Standing HR 2x10   x

## 2021-05-06 ENCOUNTER — OFFICE VISIT (OUTPATIENT)
Dept: PAIN MANAGEMENT | Age: 66
End: 2021-05-06
Payer: COMMERCIAL

## 2021-05-06 VITALS
HEIGHT: 65 IN | DIASTOLIC BLOOD PRESSURE: 72 MMHG | RESPIRATION RATE: 16 BRPM | HEART RATE: 77 BPM | BODY MASS INDEX: 42.85 KG/M2 | SYSTOLIC BLOOD PRESSURE: 128 MMHG | WEIGHT: 257.2 LBS

## 2021-05-06 DIAGNOSIS — M54.16 LUMBAR RADICULOPATHY: ICD-10-CM

## 2021-05-06 DIAGNOSIS — M47.817 LUMBOSACRAL SPONDYLOSIS WITHOUT MYELOPATHY: ICD-10-CM

## 2021-05-06 PROCEDURE — 99213 OFFICE O/P EST LOW 20 MIN: CPT | Performed by: NURSE PRACTITIONER

## 2021-05-06 RX ORDER — PREGABALIN 50 MG/1
50 CAPSULE ORAL 3 TIMES DAILY
Qty: 90 CAPSULE | Refills: 0 | Status: SHIPPED | OUTPATIENT
Start: 2021-05-06 | End: 2021-08-02 | Stop reason: SDUPTHER

## 2021-05-06 ASSESSMENT — ENCOUNTER SYMPTOMS: BACK PAIN: 1

## 2021-05-06 NOTE — PROGRESS NOTES
Patient is here today to review medication contract. Chief Complaint   Patient presents with    Follow-up    Medication Refill    Back Pain         [facility]   Patient complains of low back pain that radiates down both legs. MRI with severe stenosis. She did see NS but does not want surgery at this time. She had Caudal epidural 1/29/21 with 50% relief but still has some lingering pain in the left leg. She was taking Lyrica but asked to be switched to gabapentin due to some swelling in her left ankle. She states it was mild swelling but she is now unsure if it was the Lyrica. She is requesting to switch back to Lyrica because she does not feel gabapentin is helping. She has stopped taking the gabapentin. She completed PT for her back with some benefit. She is also following with orthopedics for her left hip - will see them in June. Hip injections were offered but she is still thinking about this. She has stopped meloxicam because it made her feel stiff. She has tried muscle relaxants but does not like the way they make her feel. Back Pain  This is a chronic problem. The current episode started more than 1 year ago. The problem occurs constantly. The problem is unchanged. The pain is present in the lumbar spine and gluteal. The pain radiates to the left knee, left thigh and left foot. The pain is at a severity of 5/10. The pain is mild. The pain is worse during the day. The symptoms are aggravated by position (with activity or excersise). Associated symptoms include numbness, tingling and weakness. Pertinent negatives include no chest pain or fever. Risk factors include obesity. She has tried heat, ice, home exercises and NSAIDs (physical therapy) for the symptoms. Patient denies any new neurological symptoms. No bowel or bladder incontinence, no weakness, and no falling.       Past Medical History:   Diagnosis Date    Lumbar radiculopathy 2/15/2021    Lumbosacral spondylosis without myelopathy 2/15/2021    Morbid obesity ([address] 2015       Past Surgical History:   Procedure Laterality Date    CHOLECYSTECTOMY  2000    NERVE BLOCK N/A 2021    : EPIDURAL STEROID INJECTION - CAUDAL (N/A )    PAIN MANAGEMENT PROCEDURE  2020    EPIDURAL STEROID INJECTION    PAIN MANAGEMENT PROCEDURE N/A 2020    EPIDURAL STEROID INJECTION performed by Merlinda Brandt, MD at [address] [facility] N/A 2021    EPIDURAL STEROID INJECTION - CAUDAL performed by Merlinda Brandt, MD at [address] [place] [address].       No Known Allergies      Current Outpatient Medications:     pregabalin (LYRICA) 50 MG capsule, Take 1 capsule by mouth 2 times daily for 30 days. , Disp: 60 capsule, Rfl: 0    meloxicam (MOBIC) 15 MG tablet, Take 1 tablet by mouth daily, Disp: 30 tablet, Rfl: 0    vitamin D (ERGOCALCIFEROL) 1.25 MG (50901 UT) CAPS capsule, Take 50,000 Units by mouth once a week, Disp: , Rfl:     acetaminophen (TYLENOL) 325 MG tablet, Take 650 mg by mouth every 6 hours as needed for Pain, Disp: , Rfl:     Omega-3 Fatty Acids (OMEGA 3 PO), Take by mouth daily , Disp: , Rfl:     Family History   Problem Relation Age of Onset    Stroke Father        Social History     Socioeconomic History    Marital status: Single     Spouse name: Not on file    Number of children: Not on file    Years of education: Not on file    Highest education level: Not on file   Occupational History    Not on file   Social Needs    Financial resource strain: Not on file    Food insecurity     Worry: Not on file     Inability: Not on file    Transportation needs     Medical: Not on file     Non-medical: Not on file   Tobacco Use    Smoking status: Former Smoker     Types: Cigarettes     Start date: 1990     Quit date: 1999     Years since quittin.3    Smokeless tobacco: Never Used   Substance and Sexual Activity    Alcohol use: No    Drug use: No    Sexual activity: Never   Lifestyle    Physical activity     Days per week: Not on file     Minutes per session: Not on file    Stress: Not on file   Relationships    Social connections     Talks on phone: Not on file     Gets together: Not on file     Attends Christianity service: Not on file     Active member of club or organization: Not on file     Attends meetings of clubs or organizations: Not on file     Relationship status: Not on file    Intimate partner violence     Fear of current or ex partner: Not on file     Emotionally abused: Not on file     Physically abused: Not on file     Forced sexual activity: Not on file   Other Topics Concern    Not on file   Social History Narrative    Not on file       Review of Systems:  Review of Systems   Constitution: Negative for chills and fever. Cardiovascular: Negative for chest pain and palpitations. Respiratory: Negative for cough and shortness of breath. Musculoskeletal: Positive for back pain and neck pain. Gastrointestinal: Negative for constipation. Neurological: Positive for numbness, tingling and weakness. Negative for disturbances in coordination and loss of balance. Physical Exam:  There were no vitals taken for this visit. Physical Exam  HENT:      Head: Normocephalic. Neck:      Musculoskeletal: Normal range of motion. Pulmonary:      Effort: Pulmonary effort is normal.   Musculoskeletal: Normal range of motion. Lumbar back: She exhibits tenderness and pain. Skin:     General: Skin is warm and dry. Neurological:      Mental Status: She is alert and oriented to person, place, and time.          Record/Diagnostics Review:    As reviewed above    Assessment:  Problem List Items Addressed This Visit     Lumbosacral spondylosis without myelopathy    Relevant Medications    pregabalin (LYRICA) 50 MG capsule    Lumbar radiculopathy    Relevant Medications    pregabalin (LYRICA) 50 MG capsule             Treatment Plan:  Increase Lyrica to 50 mg TID  Consider LESI -

## 2021-05-07 ASSESSMENT — ENCOUNTER SYMPTOMS
CONSTIPATION: 0
SHORTNESS OF BREATH: 0
COUGH: 0

## 2021-05-19 ENCOUNTER — HOSPITAL ENCOUNTER (OUTPATIENT)
Dept: NEUROLOGY | Age: 66
Discharge: HOME | End: 2021-05-19
Payer: COMMERCIAL

## 2021-05-19 DIAGNOSIS — M48.061 SPINAL STENOSIS OF LUMBAR REGION, UNSPECIFIED WHETHER NEUROGENIC CLAUDICATION PRESENT: ICD-10-CM

## 2021-05-19 PROCEDURE — 95885 MUSC TST DONE W/NERV TST LIM: CPT | Performed by: PHYSICAL MEDICINE & REHABILITATION

## 2021-05-19 PROCEDURE — 95909 NRV CNDJ TST 5-6 STUDIES: CPT | Performed by: PHYSICAL MEDICINE & REHABILITATION

## 2021-06-01 ENCOUNTER — TELEPHONE (OUTPATIENT)
Dept: ORTHOPEDIC SURGERY | Age: 66
End: 2021-06-01

## 2021-06-01 NOTE — TELEPHONE ENCOUNTER
Spoke with patient on EMG results, patient voiced understanding. Scheduled patient with Dr Loli Rogers 6/17/21    ----- Message from Angelita Starkey sent at 5/31/2021  1:17 PM EDT -----  Chronic left L4-5 Radiculopathy. Possible Early left S1 radiculopathy.  Please have patient follow up with Dr. Loli Rogers

## 2021-06-17 ENCOUNTER — OFFICE VISIT (OUTPATIENT)
Dept: ORTHOPEDIC SURGERY | Age: 66
End: 2021-06-17
Payer: COMMERCIAL

## 2021-06-17 VITALS — HEIGHT: 65 IN | BODY MASS INDEX: 42.82 KG/M2 | RESPIRATION RATE: 12 BRPM | WEIGHT: 257 LBS

## 2021-06-17 DIAGNOSIS — M48.061 SPINAL STENOSIS OF LUMBAR REGION, UNSPECIFIED WHETHER NEUROGENIC CLAUDICATION PRESENT: ICD-10-CM

## 2021-06-17 DIAGNOSIS — M54.16 LUMBAR RADICULAR PAIN: ICD-10-CM

## 2021-06-17 DIAGNOSIS — M25.552 LEFT HIP PAIN: ICD-10-CM

## 2021-06-17 DIAGNOSIS — M47.817 LUMBOSACRAL SPONDYLOSIS WITHOUT MYELOPATHY: Primary | ICD-10-CM

## 2021-06-17 DIAGNOSIS — M48.062 SPINAL STENOSIS OF LUMBAR REGION WITH NEUROGENIC CLAUDICATION: ICD-10-CM

## 2021-06-17 PROCEDURE — G8427 DOCREV CUR MEDS BY ELIG CLIN: HCPCS | Performed by: ORTHOPAEDIC SURGERY

## 2021-06-17 PROCEDURE — 3017F COLORECTAL CA SCREEN DOC REV: CPT | Performed by: ORTHOPAEDIC SURGERY

## 2021-06-17 PROCEDURE — 1036F TOBACCO NON-USER: CPT | Performed by: ORTHOPAEDIC SURGERY

## 2021-06-17 PROCEDURE — G8417 CALC BMI ABV UP PARAM F/U: HCPCS | Performed by: ORTHOPAEDIC SURGERY

## 2021-06-17 PROCEDURE — 99213 OFFICE O/P EST LOW 20 MIN: CPT | Performed by: ORTHOPAEDIC SURGERY

## 2021-06-17 NOTE — PROGRESS NOTES
Patient ID: Shawn Blackman is a 60 y.o. female    Chief Compliant:  Chief Complaint   Patient presents with    New Patient     L hip        HPI:  This is a 60 y.o. female who presents to the clinic today as a new patient for lower back pain. Patient presents with chronic left buttock pain to the left lateral thigh, anterior knee, ankle and foot with dysesthesias in the same distribution    She received lumbar epidural steroid injections in the past that improved her balance and bladder issues but did not relieve her leg pain     Patient has seen neurosurgery in the past per prior notes and declined surgical intervention. On my visit today patient expresses that she does not wish to have surgical intervention. Review of Systems   All other systems reviewed and are negative. Past History:    Current Outpatient Medications:     pregabalin (LYRICA) 50 MG capsule, Take 1 capsule by mouth 3 times daily for 30 days. , Disp: 90 capsule, Rfl: 0    meloxicam (MOBIC) 15 MG tablet, Take 1 tablet by mouth daily (Patient not taking: Reported on 2021), Disp: 30 tablet, Rfl: 0    vitamin D (ERGOCALCIFEROL) 1.25 MG (89085 UT) CAPS capsule, Take 50,000 Units by mouth once a week, Disp: , Rfl:     acetaminophen (TYLENOL) 325 MG tablet, Take 650 mg by mouth every 6 hours as needed for Pain, Disp: , Rfl:     Omega-3 Fatty Acids (OMEGA 3 PO), Take by mouth daily , Disp: , Rfl:   No Known Allergies  Social History     Socioeconomic History    Marital status: Single     Spouse name: Not on file    Number of children: Not on file    Years of education: Not on file    Highest education level: Not on file   Occupational History    Not on file   Tobacco Use    Smoking status: Former Smoker     Types: Cigarettes     Start date: 1990     Quit date: 1999     Years since quittin.4    Smokeless tobacco: Never Used   Substance and Sexual Activity    Alcohol use: No    Drug use: No    Sexual activity: Never   Other Topics Concern    Not on file   Social History Narrative    Not on file     Social Determinants of Health     Financial Resource Strain:     Difficulty of Paying Living Expenses:    Food Insecurity:     Worried About Running Out of Food in the Last Year:     [address] in the Last Year:    Transportation Needs:     Lack of Transportation (Medical):  Lack of Transportation (Non-Medical):    Physical Activity:     Days of Exercise per Week:     Minutes of Exercise per Session:    Stress:     Feeling of Stress :    Social Connections:     Frequency of Communication with Friends and Family:     Frequency of Social Gatherings with Friends and Family:     Attends Taoist Services:     Active Member of Clubs or Organizations:     Attends Club or Organization Meetings:     Marital Status:    Intimate Partner Violence:     Fear of Current or Ex-Partner:     Emotionally Abused:     Physically Abused:     Sexually Abused:      Past Medical History:   Diagnosis Date    Lumbar radiculopathy 2/15/2021    Lumbosacral spondylosis without myelopathy 2/15/2021    Morbid obesity ([address] 4/17/2015     Past Surgical History:   Procedure Laterality Date    CHOLECYSTECTOMY  1/2000    NERVE BLOCK N/A 01/29/2021    : EPIDURAL STEROID INJECTION - CAUDAL (N/A )    PAIN MANAGEMENT PROCEDURE  12/04/2020    EPIDURAL STEROID INJECTION    PAIN MANAGEMENT PROCEDURE N/A 12/4/2020    EPIDURAL STEROID INJECTION performed by Tramaine Webb MD at [address] [place] [address] N/A 1/29/2021    EPIDURAL STEROID INJECTION - CAUDAL performed by Tramaine Webb MD at [address] [place] [address].     Family History   Problem Relation Age of Onset    Stroke Father         Physical Exam:  Vitals signs and nursing note reviewed. Constitutional:       Appearance: She is well-developed. HENT:      Head: Normocephalic and atraumatic.       Nose: Nose normal.   Eyes: Conjunctiva/sclera: Conjunctivae normal.   Neck:      Musculoskeletal: Normal range of motion and neck supple. Pulmonary:      Effort: Pulmonary effort is normal. No respiratory distress. Musculoskeletal:      Comments: Normal gait     Skin:     General: Skin is warm and dry. Neurological:      Mental Status: She is alert and oriented to person, place, and time. Sensory: No sensory deficit. Psychiatric:         Behavior: Behavior normal.         Thought Content: Thought content normal.        Diagnostic imaging:  Diagnostic x-rays AP lateral lumbar spine patient with ankylosis L4 to sacrum severe multilevel lumbar degenerative disc disease    MRI lumbar spine is reviewed patient with lumbar spinal stenosis greatest at L2-3 L3-4 significant foraminal stenosis at all levels    EMGs reviewed patient with some chronic left L4-5 and some S1 radiculopathy      Assessment and Plan:  1. Lumbosacral spondylosis without myelopathy    2. Spinal stenosis of lumbar region, unspecified whether neurogenic claudication present    3. Left hip pain    4. Spinal stenosis of lumbar region with neurogenic claudication    5. Lumbar radicular pain      Patient is reluctant to proceed with surgery and would like to try chiropractic intervention first    Follow up prn    If follows up likely will require CT myelogram for surgical planning prior to surgical intervention    Provider Attestation:  Bard Barbara Chavarria, personally performed the services described in this documentation. All medical record entries made by the scribe were at my direction and in my presence. I have reviewed the chart and discharge instructions and agree that the records reflect my personal performance and is accurate and complete. Jeff Crowe MD 6/17/21     Scribe Attestation:  By signing my name below, Lorraine Noonan attest that this documentation has been prepared under the direction and in the presence of Dr. Jaclyn Kumar.  Electronically signed: Bonifacio Pickering, 6/17/21     Please note that this chart was generated using voice recognition Dragon dictation software. Although every effort was made to ensure the accuracy of this automated transcription, some errors in transcription may have occurred.

## 2021-07-28 ENCOUNTER — TELEPHONE (OUTPATIENT)
Dept: PAIN MANAGEMENT | Age: 66
End: 2021-07-28

## 2021-07-28 DIAGNOSIS — M47.817 LUMBOSACRAL SPONDYLOSIS WITHOUT MYELOPATHY: ICD-10-CM

## 2021-07-28 DIAGNOSIS — M54.16 LUMBAR RADICULOPATHY: ICD-10-CM

## 2021-07-28 RX ORDER — PREGABALIN 50 MG/1
50 CAPSULE ORAL 3 TIMES DAILY
Qty: 90 CAPSULE | Refills: 0 | Status: CANCELLED | OUTPATIENT
Start: 2021-07-28 | End: 2021-08-27

## 2021-07-28 NOTE — TELEPHONE ENCOUNTER
Lashaun Ramirez is requesting a refill on the following medications:   Requested Prescriptions     Pending Prescriptions Disp Refills    pregabalin (LYRICA) 50 MG capsule 90 capsule 0     Sig: Take 1 capsule by mouth 3 times daily for 30 days.        Last OV 5/6/21, Pt next appt is not until 8/5/21     Future Appointments   Date Time Provider Sarah Steve   8/5/2021  3:00 PM KEY Land - ROHIT Catalan

## 2021-08-02 ENCOUNTER — TELEPHONE (OUTPATIENT)
Dept: PAIN MANAGEMENT | Age: 66
End: 2021-08-02

## 2021-08-02 DIAGNOSIS — M47.817 LUMBOSACRAL SPONDYLOSIS WITHOUT MYELOPATHY: ICD-10-CM

## 2021-08-02 DIAGNOSIS — M54.16 LUMBAR RADICULOPATHY: ICD-10-CM

## 2021-08-02 RX ORDER — PREGABALIN 50 MG/1
50 CAPSULE ORAL 3 TIMES DAILY
Qty: 90 CAPSULE | Refills: 0 | Status: SHIPPED | OUTPATIENT
Start: 2021-08-02 | End: 2021-08-05 | Stop reason: DRUGHIGH

## 2021-08-05 ENCOUNTER — OFFICE VISIT (OUTPATIENT)
Dept: PAIN MANAGEMENT | Age: 66
End: 2021-08-05
Payer: COMMERCIAL

## 2021-08-05 VITALS
DIASTOLIC BLOOD PRESSURE: 77 MMHG | SYSTOLIC BLOOD PRESSURE: 136 MMHG | BODY MASS INDEX: 42.82 KG/M2 | WEIGHT: 257 LBS | HEIGHT: 65 IN | HEART RATE: 98 BPM

## 2021-08-05 DIAGNOSIS — M48.061 SPINAL STENOSIS OF LUMBAR REGION, UNSPECIFIED WHETHER NEUROGENIC CLAUDICATION PRESENT: Primary | ICD-10-CM

## 2021-08-05 DIAGNOSIS — M47.817 LUMBOSACRAL SPONDYLOSIS WITHOUT MYELOPATHY: ICD-10-CM

## 2021-08-05 DIAGNOSIS — M54.16 LUMBAR RADICULOPATHY: ICD-10-CM

## 2021-08-05 PROCEDURE — 99213 OFFICE O/P EST LOW 20 MIN: CPT | Performed by: NURSE PRACTITIONER

## 2021-08-05 RX ORDER — PREGABALIN 50 MG/1
50 CAPSULE ORAL 3 TIMES DAILY
Qty: 90 CAPSULE | Refills: 0 | Status: CANCELLED | OUTPATIENT
Start: 2021-08-05 | End: 2021-09-04

## 2021-08-05 RX ORDER — PREGABALIN 100 MG/1
100 CAPSULE ORAL 2 TIMES DAILY
Qty: 60 CAPSULE | Refills: 1 | Status: SHIPPED | OUTPATIENT
Start: 2021-08-23 | End: 2021-11-22 | Stop reason: SDUPTHER

## 2021-08-05 ASSESSMENT — ENCOUNTER SYMPTOMS
RESPIRATORY NEGATIVE: 1
COUGH: 0
SHORTNESS OF BREATH: 0
CONSTIPATION: 0
BACK PAIN: 1

## 2021-08-05 NOTE — PROGRESS NOTES
Chief Complaint   Patient presents with    Leg Pain    Medication Refill    Hip Pain         [facility]  Patient complains of low back pain that radiates down both legs. MRI with severe stenosis. She did see NS but does not want surgery at this time. She had Caudal epidural 1/29/21 with 50% relief but still has some lingering pain in the left leg. She completed PT for her back with some benefit. She is also following with orthopedics for her left hip. Hip injections were offered but she is still thinking about this. She has stopped meloxicam because it made her feel stiff. She has tried muscle relaxants but does not like the way they make her feel. She is currently taking Lyrica 50 mg TID with benefit. She states she has been taking 2 capsules together -  100 mg dose and this has been managing her pain well. She denies side effects at this dose. Leg Pain   The incident occurred more than 1 week ago. The injury mechanism is unknown. The pain is present in the left hip, left thigh, left leg, left ankle, left foot, left heel, left knee and left toes. The quality of the pain is described as aching. Pain scale: sitting 0/10 walking 5/10. The pain is moderate. The pain has been intermittent since onset. Associated symptoms include muscle weakness, numbness and tingling. Pertinent negatives include no inability to bear weight. The symptoms are aggravated by movement and weight bearing. She has tried non-weight bearing and rest (Lyrica) for the symptoms. The treatment provided mild relief. Patient denies any new neurological symptoms. No bowel or bladder incontinence, no weakness, and no falling.       Past Medical History:   Diagnosis Date    Lumbar radiculopathy 2/15/2021    Lumbosacral spondylosis without myelopathy 2/15/2021    Morbid obesity ([facility] [address] 4/17/2015       Past Surgical History:   Procedure Laterality Date    CHOLECYSTECTOMY  1/2000    NERVE BLOCK N/A 01/29/2021    : EPIDURAL STEROID INJECTION - CAUDAL (N/A )    PAIN MANAGEMENT PROCEDURE  2020    EPIDURAL STEROID INJECTION    PAIN MANAGEMENT PROCEDURE N/A 2020    EPIDURAL STEROID INJECTION performed by Andres Rothman MD at [address] [facility] N/A 2021    EPIDURAL STEROID INJECTION - CAUDAL performed by Andres Rothman MD at [address] [facility].       No Known Allergies      Current Outpatient Medications:     pregabalin (LYRICA) 50 MG capsule, Take 1 capsule by mouth 3 times daily for 30 days. , Disp: 90 capsule, Rfl: 0    vitamin D (ERGOCALCIFEROL) 1.25 MG (76812 UT) CAPS capsule, Take 50,000 Units by mouth once a week, Disp: , Rfl:     acetaminophen (TYLENOL) 325 MG tablet, Take 650 mg by mouth every 6 hours as needed for Pain, Disp: , Rfl:     Omega-3 Fatty Acids (OMEGA 3 PO), Take by mouth daily , Disp: , Rfl:     meloxicam (MOBIC) 15 MG tablet, Take 1 tablet by mouth daily (Patient not taking: Reported on 2021), Disp: 30 tablet, Rfl: 0    Family History   Problem Relation Age of Onset    Stroke Father        Social History     Socioeconomic History    Marital status: Single     Spouse name: Not on file    Number of children: Not on file    Years of education: Not on file    Highest education level: Not on file   Occupational History    Not on file   Tobacco Use    Smoking status: Former Smoker     Types: Cigarettes     Start date: 1990     Quit date: 1999     Years since quittin.6    Smokeless tobacco: Never Used   Substance and Sexual Activity    Alcohol use: No    Drug use: No    Sexual activity: Never   Other Topics Concern    Not on file   Social History Narrative    Not on file     Social Determinants of Health     Financial Resource Strain:     Difficulty of Paying Living Expenses:    Food Insecurity:     Worried About [address] in the Last Year:     [address] in the Last Year:    Transportation Needs:     Lack of Transportation (Medical):    Kari Lack of Transportation (Non-Medical):    Physical Activity:     Days of Exercise per Week:     Minutes of Exercise per Session:    Stress:     Feeling of Stress :    Social Connections:     Frequency of Communication with Friends and Family:     Frequency of Social Gatherings with Friends and Family:     Attends [employer] Services:     Active Member of Clubs or Organizations:     Attends Club or Organization Meetings:     Marital Status:    Intimate Partner Violence:     Fear of Current or Ex-Partner:     Emotionally Abused:     Physically Abused:     Sexually Abused:        Review of Systems:  Review of Systems   Constitutional: Negative. Negative for chills and fever. Cardiovascular: Negative. Negative for chest pain and palpitations. Respiratory: Negative. Negative for cough and shortness of breath. Musculoskeletal: Positive for back pain and myalgias. Hip and leg pain    Gastrointestinal: Negative for constipation. Neurological: Positive for numbness and tingling. Negative for disturbances in coordination and loss of balance. Physical Exam:  /77   Pulse 98   Ht 5' 4.5\" (1.638 m)   Wt 257 lb (116.6 kg)   BMI 43.43 kg/m²     Physical Exam  Constitutional:        HENT:      Head: Normocephalic. Pulmonary:      Effort: Pulmonary effort is normal.   Musculoskeletal:         General: Normal range of motion. Cervical back: Normal range of motion. Lumbar back: Tenderness present. Skin:     General: Skin is warm and dry. Neurological:      Mental Status: She is alert and oriented to person, place, and time.          Record/Diagnostics Review:    As reviewed above    Assessment:  Problem List Items Addressed This Visit     Lumbosacral spondylosis without myelopathy    Relevant Medications    pregabalin (LYRICA) 100 MG capsule (Start on 8/23/2021)    Lumbar radiculopathy    Relevant Medications    pregabalin (LYRICA) 100 MG capsule (Start on 8/23/2021) Other Visit Diagnoses     Spinal stenosis of lumbar region, unspecified whether neurogenic claudication present    -  Primary    Relevant Medications    pregabalin (LYRICA) 100 MG capsule (Start on 8/23/2021)             Treatment Plan:  Discussed different treatment options including continued conservative care such as physical therapy, chiropractic care, acupuncture. Discussed different interventional options such as epidural steroids or medial branch blocks. Not interested in injections - tried in the past with no relief. Also discussed surgical evaluation. She is not interested in surgery.    Increase Lyrica to 100 mg BID  Consider PT in the future  Follow up in three months

## 2021-10-07 ENCOUNTER — HOSPITAL ENCOUNTER (EMERGENCY)
Age: 66
Discharge: HOME | End: 2021-10-07
Attending: EMERGENCY MEDICINE
Payer: MEDICARE

## 2021-10-07 ENCOUNTER — APPOINTMENT (OUTPATIENT)
Dept: GENERAL RADIOLOGY | Age: 66
End: 2021-10-07
Payer: MEDICARE

## 2021-10-07 VITALS
BODY MASS INDEX: 41.65 KG/M2 | RESPIRATION RATE: 18 BRPM | OXYGEN SATURATION: 98 % | TEMPERATURE: 98 F | HEART RATE: 77 BPM | HEIGHT: 65 IN | DIASTOLIC BLOOD PRESSURE: 72 MMHG | WEIGHT: 250 LBS | SYSTOLIC BLOOD PRESSURE: 135 MMHG

## 2021-10-07 DIAGNOSIS — M25.512 ACUTE PAIN OF LEFT SHOULDER: Primary | ICD-10-CM

## 2021-10-07 PROCEDURE — 93005 ELECTROCARDIOGRAM TRACING: CPT | Performed by: PHYSICIAN ASSISTANT

## 2021-10-07 PROCEDURE — 99283 EMERGENCY DEPT VISIT LOW MDM: CPT

## 2021-10-07 PROCEDURE — 12001 RPR S/N/AX/GEN/TRNK 2.5CM/<: CPT

## 2021-10-07 PROCEDURE — 73030 X-RAY EXAM OF SHOULDER: CPT

## 2021-10-07 RX ORDER — ACETAMINOPHEN AND CODEINE PHOSPHATE 300; 30 MG/1; MG/1
1 TABLET ORAL EVERY 6 HOURS PRN
Qty: 20 TABLET | Refills: 0 | Status: SHIPPED | OUTPATIENT
Start: 2021-10-07 | End: 2021-10-12

## 2021-10-07 ASSESSMENT — ENCOUNTER SYMPTOMS
VOMITING: 0
WHEEZING: 0
ABDOMINAL PAIN: 0
ANAL BLEEDING: 0
COUGH: 0
CHEST TIGHTNESS: 0
ABDOMINAL DISTENTION: 0
NAUSEA: 0
RECTAL PAIN: 0
SHORTNESS OF BREATH: 0
CONSTIPATION: 0
DIARRHEA: 0
BLOOD IN STOOL: 0
BACK PAIN: 0

## 2021-10-07 ASSESSMENT — PAIN DESCRIPTION - FREQUENCY: FREQUENCY: INTERMITTENT

## 2021-10-07 ASSESSMENT — PAIN SCALES - GENERAL: PAINLEVEL_OUTOF10: 7

## 2021-10-07 ASSESSMENT — PAIN DESCRIPTION - PAIN TYPE: TYPE: ACUTE PAIN

## 2021-10-07 ASSESSMENT — PAIN DESCRIPTION - LOCATION: LOCATION: SHOULDER

## 2021-10-07 ASSESSMENT — PAIN DESCRIPTION - DESCRIPTORS: DESCRIPTORS: CRAMPING;SHARP

## 2021-10-07 NOTE — ED PROVIDER NOTES
The patient was seen and examined by me in conjunction with the mid-level provider. I agree with his/her assessment and treatment plan. Medical Decision Making: X-rays are negative and she will be treated symptomatically. EKG on my interpretation shows sinus rhythm without acute change. CONSULTS:  None    PROCEDURES:  None    FINAL IMPRESSION      1. Acute pain of left shoulder         DISPOSITION/PLAN   DISPOSITION Decision To Discharge 10/07/2021 07:31:47 PM       PATIENT REFERRED TO:   Anaya Marrero MD  [address] [place] [address]  [id number] [phone]      As needed    [facility] ED  [address] [facility]  [phone]    If symptoms worsen      DISCHARGE MEDICATIONS:     New Prescriptions    ACETAMINOPHEN-CODEINE (TYLENOL/CODEINE #3) 300-30 MG PER TABLET    Take 1 tablet by mouth every 6 hours as needed for Pain for up to 5 days.          (Please note that portions of this note were completed with a voice recognition program.  Efforts were made to edit the dictations but occasionally words are mis-transcribed.)    Felipe Crooks MD  Attending Emergency Physician  Nicolas Goodman MD  10/07/21 Marsha Oliveira

## 2021-10-07 NOTE — ED PROVIDER NOTES
HISTORY         Diagnosis Date    Lumbar radiculopathy 2/15/2021    Lumbosacral spondylosis without myelopathy 2/15/2021    Morbid obesity ([facility] [address] 4/17/2015       SURGICAL HISTORY           Procedure Laterality Date    CHOLECYSTECTOMY  1/2000    NERVE BLOCK N/A 01/29/2021    : EPIDURAL STEROID INJECTION - CAUDAL (N/A )    PAIN MANAGEMENT PROCEDURE  12/04/2020    EPIDURAL STEROID INJECTION    PAIN MANAGEMENT PROCEDURE N/A 12/4/2020    EPIDURAL STEROID INJECTION performed by Senait Harden MD at [address] [facility] N/A 1/29/2021    EPIDURAL STEROID INJECTION - CAUDAL performed by Senait Harden MD at [address] [facility]           Problem Relation Age of Onset    Stroke Father      Family Status   Relation Name Status    Father  (Not Specified)        SOCIAL HISTORY      reports that she quit smoking about 22 years ago. Her smoking use included cigarettes. She started smoking about 31 years ago. She has never used smokeless tobacco. She reports that she does not drink alcohol and does not use drugs. REVIEW OF SYSTEMS    (2-9 systems for level 4, 10 or more for level 5)     Review of Systems   Constitutional: Negative for appetite change, chills, diaphoresis, fatigue, fever and unexpected weight change. Respiratory: Negative for cough, chest tightness, shortness of breath and wheezing. Cardiovascular: Negative for chest pain, palpitations and leg swelling. Gastrointestinal: Negative for abdominal distention, abdominal pain, anal bleeding, blood in stool, constipation, diarrhea, nausea, rectal pain and vomiting. Genitourinary: Negative for dysuria, frequency and urgency. Musculoskeletal: Positive for arthralgias ( Left shoulder). Negative for back pain, gait problem, joint swelling, myalgias, neck pain and neck stiffness. Skin: Negative.     Neurological: Negative for dizziness, tremors, seizures, syncope, facial asymmetry, speech difficulty, weakness, light-headedness, numbness and headaches. Except as noted above the remainder of the review of systems was reviewed and negative. PHYSICAL EXAM    (up to 7 for level 4, 8 or more for level 5)     ED Triage Vitals [10/07/21 1748]   BP Temp Temp Source Pulse Resp SpO2 Height Weight   135/72 98 °F (36.7 °C) Oral 77 18 98 % 5' 4.5\" (1.638 m) 250 lb (113.4 kg)       Physical Exam  Vitals and nursing note reviewed. Constitutional:       General: She is not in acute distress. Appearance: Normal appearance. She is well-developed. She is not ill-appearing, toxic-appearing or diaphoretic. HENT:      Head: Normocephalic and atraumatic. Eyes:      General: No scleral icterus. Right eye: No discharge. Left eye: No discharge. Conjunctiva/sclera: Conjunctivae normal.   Neck:      Thyroid: No thyroid mass, thyromegaly or thyroid tenderness. Cardiovascular:      Rate and Rhythm: Normal rate and regular rhythm. Heart sounds: Normal heart sounds. No murmur heard. No friction rub. No gallop. Pulmonary:      Effort: Pulmonary effort is normal. No respiratory distress. Breath sounds: Normal breath sounds. No stridor. No wheezing, rhonchi or rales. Chest:      Chest wall: No tenderness. Abdominal:      General: Bowel sounds are normal.      Palpations: Abdomen is soft. Tenderness: There is no abdominal tenderness. Musculoskeletal:         General: No tenderness. Normal range of motion. Cervical back: Normal range of motion and neck supple. Comments: No midline bony tenderness over the cervical thoracic or lumbar sacral spine. She is mildly tender over the left trapezius region with some muscle spasms noted. She has full range of motion and good strength in the left shoulder 5 out of 5 against resistance abduction abduction external rotation. Neurovascularly intact with 2+ radial pulses and normal  strength 5 out of 5 bilaterally.    Skin: General: Skin is warm and dry. Neurological:      General: No focal deficit present. Mental Status: She is alert and oriented to person, place, and time. Cranial Nerves: No cranial nerve deficit. Sensory: No sensory deficit. Motor: No weakness. Coordination: Coordination normal.   Psychiatric:         Attention and Perception: Attention and perception normal.         Mood and Affect: Mood and affect normal.         Speech: Speech normal.         Behavior: Behavior normal. Behavior is cooperative. Thought Content: Thought content normal.         Cognition and Memory: Cognition and memory normal.         Judgment: Judgment normal.             DIAGNOSTIC RESULTS     EKG: All EKG's are interpreted by the Emergency Department Physician who either signs or Co-signs this chart in the absence of a cardiologist.    Normal sinus rhythm without ST segment elevation or T wave inversion    RADIOLOGY:   Non-plain film images such as CT, Ultrasound and MRI are read by the radiologist. [facility] radiographic images are visualized and preliminarily interpreted by the emergency physician with the below findings:    Left shoulder x-ray with no acute findings    Interpretation per the Radiologist below, if available at the time of this note:        ED BEDSIDE ULTRASOUND:   Performed by ED Physician - none    LABS:  Results for orders placed or performed during the hospital encounter of 10/07/21   EKG 12 Lead   Result Value Ref Range    Ventricular Rate 80 BPM    Atrial Rate 80 BPM    P-R Interval 154 ms    QRS Duration 72 ms    Q-T Interval 368 ms    QTc Calculation (Bazett) 424 ms    P Axis 56 degrees    R Axis 14 degrees    T Axis 28 degrees       All other labs were within normal range or not returned as of this dictation.     EMERGENCY DEPARTMENT COURSE and DIFFERENTIAL DIAGNOSIS/MDM:   Vitals:    Vitals:    10/07/21 1748   BP: 135/72   Pulse: 77   Resp: 18   Temp: 98 °F (36.7 °C)   TempSrc: Oral

## 2021-10-08 LAB
EKG ATRIAL RATE: 80 BPM
EKG P AXIS: 56 DEGREES
EKG P-R INTERVAL: 154 MS
EKG Q-T INTERVAL: 368 MS
EKG QRS DURATION: 72 MS
EKG QTC CALCULATION (BAZETT): 424 MS
EKG R AXIS: 14 DEGREES
EKG T AXIS: 28 DEGREES
EKG VENTRICULAR RATE: 80 BPM

## 2021-10-08 PROCEDURE — 93010 ELECTROCARDIOGRAM REPORT: CPT | Performed by: INTERNAL MEDICINE

## 2021-10-14 ENCOUNTER — APPOINTMENT (OUTPATIENT)
Dept: GENERAL RADIOLOGY | Age: 66
End: 2021-10-14
Payer: MEDICARE

## 2021-10-14 ENCOUNTER — HOSPITAL ENCOUNTER (EMERGENCY)
Age: 66
Discharge: HOME | End: 2021-10-14
Attending: EMERGENCY MEDICINE
Payer: MEDICARE

## 2021-10-14 ENCOUNTER — APPOINTMENT (OUTPATIENT)
Dept: CT IMAGING | Age: 66
End: 2021-10-14
Payer: MEDICARE

## 2021-10-14 VITALS
RESPIRATION RATE: 16 BRPM | SYSTOLIC BLOOD PRESSURE: 139 MMHG | HEIGHT: 64 IN | DIASTOLIC BLOOD PRESSURE: 78 MMHG | BODY MASS INDEX: 42.68 KG/M2 | TEMPERATURE: 98.1 F | HEART RATE: 86 BPM | WEIGHT: 250 LBS | OXYGEN SATURATION: 97 %
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
## 2021-10-14 DIAGNOSIS — S09.90XA CLOSED HEAD INJURY, INITIAL ENCOUNTER: ICD-10-CM

## 2021-10-14 DIAGNOSIS — S80.00XA CONTUSION OF KNEE, UNSPECIFIED LATERALITY, INITIAL ENCOUNTER: ICD-10-CM

## 2021-10-14 PROCEDURE — 73562 X-RAY EXAM OF KNEE 3: CPT

## 2021-10-14 PROCEDURE — 73610 X-RAY EXAM OF ANKLE: CPT

## 2021-10-14 PROCEDURE — 99283 EMERGENCY DEPT VISIT LOW MDM: CPT

## 2021-10-14 PROCEDURE — 70450 CT HEAD/BRAIN W/O DYE: CPT
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
## 2021-10-15 ASSESSMENT — ENCOUNTER SYMPTOMS
RHINORRHEA: 0
WHEEZING: 0
COLOR CHANGE: 0
NAUSEA: 0
ABDOMINAL PAIN: 0
VOMITING: 0
SORE THROAT: 0
SHORTNESS OF BREATH: 0
DIARRHEA: 0
SINUS PRESSURE: 0
CONSTIPATION: 0
COUGH: 0
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
## 2021-10-22 ENCOUNTER — HOSPITAL ENCOUNTER (OUTPATIENT)
Age: 66
Setting detail: SPECIMEN
Discharge: HOME | End: 2021-10-22
Payer: MEDICARE

## 2021-10-22 ENCOUNTER — OFFICE VISIT (OUTPATIENT)
Dept: PAIN MANAGEMENT | Age: 66
End: 2021-10-22
Payer: MEDICARE

## 2021-10-22 VITALS
SYSTOLIC BLOOD PRESSURE: 137 MMHG | BODY MASS INDEX: 42.09 KG/M2 | WEIGHT: 252.6 LBS | HEIGHT: 65 IN | DIASTOLIC BLOOD PRESSURE: 85 MMHG

## 2021-10-22 DIAGNOSIS — G89.29 OTHER CHRONIC PAIN: ICD-10-CM

## 2021-10-22 DIAGNOSIS — M48.061 SPINAL STENOSIS OF LUMBAR REGION, UNSPECIFIED WHETHER NEUROGENIC CLAUDICATION PRESENT: ICD-10-CM

## 2021-10-22 DIAGNOSIS — M54.16 LUMBAR RADICULOPATHY: Primary | ICD-10-CM

## 2021-10-22 PROCEDURE — 99214 OFFICE O/P EST MOD 30 MIN: CPT | Performed by: PAIN MEDICINE

## 2021-10-22 RX ORDER — TRAMADOL HYDROCHLORIDE 50 MG/1
50 TABLET ORAL 2 TIMES DAILY
Qty: 14 TABLET | Refills: 0 | Status: SHIPPED | OUTPATIENT
Start: 2021-10-22 | End: 2021-11-19 | Stop reason: SDUPTHER

## 2021-10-22 RX ORDER — PREGABALIN 100 MG/1
100 CAPSULE ORAL 2 TIMES DAILY
Qty: 60 CAPSULE | Refills: 1 | Status: SHIPPED | OUTPATIENT
Start: 2021-10-22 | End: 2021-11-22 | Stop reason: SDUPTHER

## 2021-10-22 ASSESSMENT — ENCOUNTER SYMPTOMS
BACK PAIN: 1
BOWEL INCONTINENCE: 0
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
## 2021-10-26 LAB
6-ACETYLMORPHINE, UR: NOT DETECTED
7-AMINOCLONAZEPAM, URINE: NOT DETECTED
ALPHA-OH-ALPRAZ, URINE: NOT DETECTED
ALPHA-OH-MIDAZOLAM, URINE: NOT DETECTED
ALPRAZOLAM, URINE: NOT DETECTED
AMPHETAMINES, URINE: NOT DETECTED
BARBITURATES, URINE: NOT DETECTED
BENZOYLECGONINE, UR: NOT DETECTED
BUPRENORPHINE URINE: NOT DETECTED
CARISOPRODOL, UR: NOT DETECTED
CLONAZEPAM, URINE: NOT DETECTED
CODEINE, URINE: PRESENT
CREATININE URINE: 69.4 MG/DL (ref 20–400)
DIAZEPAM, URINE: NOT DETECTED
DRUGS EXPECTED, UR: NORMAL
EER HI RES INTERP UR: NORMAL
ETHYL GLUCURONIDE UR: NOT DETECTED
FENTANYL URINE: NOT DETECTED
GABAPENTIN: NOT DETECTED
HYDROCODONE, URINE: NOT DETECTED
HYDROMORPHONE, URINE: PRESENT
LORAZEPAM, URINE: NOT DETECTED
MARIJUANA METAB, UR: NOT DETECTED
MDA, UR: NOT DETECTED
MDEA, EVE, UR: NOT DETECTED
MDMA URINE: NOT DETECTED
MEPERIDINE METAB, UR: NOT DETECTED
METHADONE, URINE: NOT DETECTED
METHAMPHETAMINE, URINE: NOT DETECTED
METHYLPHENIDATE: NOT DETECTED
MIDAZOLAM, URINE: NOT DETECTED
MORPHINE URINE: PRESENT
NALOXONE URINE: NOT DETECTED
NORBUPRENORPHINE, URINE: NOT DETECTED
NORDIAZEPAM, URINE: NOT DETECTED
NORFENTANYL, URINE: NOT DETECTED
NORHYDROCODONE, URINE: NOT DETECTED
NOROXYCODONE, URINE: NOT DETECTED
NOROXYMORPHONE, URINE: NOT DETECTED
OXAZEPAM, URINE: NOT DETECTED
OXYCODONE URINE: NOT DETECTED
OXYMORPHONE, URINE: NOT DETECTED
PAIN MANAGEMENT DRUG PANEL INTERP, URINE: NORMAL
PAIN MGT DRUG PANEL, HI RES, UR: NORMAL
PCP,URINE: NOT DETECTED
PHENTERMINE, UR: NOT DETECTED
PREGABALIN: PRESENT
TAPENTADOL, URINE: NOT DETECTED
TAPENTADOL-O-SULFATE, URINE: NOT DETECTED
TEMAZEPAM, URINE: NOT DETECTED
TRAMADOL, URINE: NOT DETECTED
ZOLPIDEM METABOLITE (ZCA), URINE: NOT DETECTED
ZOLPIDEM, URINE: NOT DETECTED

## 2021-11-01 DIAGNOSIS — M48.061 SPINAL STENOSIS OF LUMBAR REGION, UNSPECIFIED WHETHER NEUROGENIC CLAUDICATION PRESENT: ICD-10-CM

## 2021-11-01 DIAGNOSIS — M54.16 LUMBAR RADICULOPATHY: ICD-10-CM

## 2021-11-04 DIAGNOSIS — M48.061 SPINAL STENOSIS OF LUMBAR REGION, UNSPECIFIED WHETHER NEUROGENIC CLAUDICATION PRESENT: ICD-10-CM

## 2021-11-04 DIAGNOSIS — M54.16 LUMBAR RADICULOPATHY: ICD-10-CM

## 2021-11-04 RX ORDER — TRAMADOL HYDROCHLORIDE 50 MG/1
50 TABLET ORAL 2 TIMES DAILY
Qty: 14 TABLET | Refills: 0 | Status: CANCELLED | OUTPATIENT
Start: 2021-11-04 | End: 2021-11-11

## 2021-11-04 NOTE — TELEPHONE ENCOUNTER
Patient called in again regarding a refill of her Tramadol. Patient states she only received a week supply, is currently out and requesting a refill. Please advise. Johanna Solano is requesting a refill on the following medications:   Requested Prescriptions     Pending Prescriptions Disp Refills    traMADol (ULTRAM) 50 MG tablet 14 tablet 0     Sig: Take 1 tablet by mouth 2 times daily for 7 days.  Take lowest dose possible to manage pain       Last OV 10/22/21    Future Appointments   Date Time Provider Sarah Steve   11/18/2021  3:20 PM KEY Bolivar - CNP [address]

## 2021-11-04 NOTE — TELEPHONE ENCOUNTER
Patient called in again regarding a refill of her Tramadol. Patient states she only received a week supply, is currently out and requesting a refill. Please advise. Luke Bonilla is requesting a refill on the following medications:   Requested Prescriptions     Pending Prescriptions Disp Refills    traMADol (ULTRAM) 50 MG tablet 14 tablet 0     Sig: Take 1 tablet by mouth 2 times daily for 7 days.  Take lowest dose possible to manage pain       Last OV 10/22/21    Future Appointments   Date Time Provider Sarah Steve   11/18/2021  3:20 PM Will KEY Zamora CNP [address]

## 2021-11-04 NOTE — TELEPHONE ENCOUNTER
Patient called in again regarding a refill of her Tramadol. Patient states she only received a week supply, is currently out and requesting a refill. Please advise. Juventino Zee is requesting a refill on the following medications:   Requested Prescriptions     Pending Prescriptions Disp Refills    traMADol (ULTRAM) 50 MG tablet 14 tablet 0     Sig: Take 1 tablet by mouth 2 times daily for 7 days.  Take lowest dose possible to manage pain       Last OV 10/22/21    Future Appointments   Date Time Provider Sarah Steve   11/18/2021  3:20 PM Mark Santoyo, APRN - CNP [address]

## 2021-11-18 ENCOUNTER — OFFICE VISIT (OUTPATIENT)
Dept: PAIN MANAGEMENT | Age: 66
End: 2021-11-18
Payer: MEDICARE

## 2021-11-18 VITALS
BODY MASS INDEX: 44.22 KG/M2 | HEART RATE: 95 BPM | HEIGHT: 64 IN | WEIGHT: 259 LBS | DIASTOLIC BLOOD PRESSURE: 77 MMHG | SYSTOLIC BLOOD PRESSURE: 129 MMHG

## 2021-11-18 DIAGNOSIS — M54.16 LUMBAR RADICULOPATHY: ICD-10-CM

## 2021-11-18 DIAGNOSIS — M48.061 SPINAL STENOSIS OF LUMBAR REGION, UNSPECIFIED WHETHER NEUROGENIC CLAUDICATION PRESENT: ICD-10-CM

## 2021-11-18 DIAGNOSIS — M47.817 LUMBOSACRAL SPONDYLOSIS WITHOUT MYELOPATHY: Primary | ICD-10-CM

## 2021-11-18 PROCEDURE — 99213 OFFICE O/P EST LOW 20 MIN: CPT | Performed by: NURSE PRACTITIONER

## 2021-11-18 ASSESSMENT — ENCOUNTER SYMPTOMS: BACK PAIN: 1

## 2021-11-18 NOTE — PROGRESS NOTES
Patient is here today to review medication contract. Chief Complaint: back pain    [facility] Patient complains of low back pain that radiates down both legs. MRI with severe stenosis. She did see NS but does not want surgery at this time. She had Caudal epidural 1/29/21 with 50% relief but still has some lingering pain in the left leg. She completed PT for her back with some benefit. She is also following with orthopedics for her left hip. Hip injections were offered but she is still thinking about this. She has stopped meloxicam because it made her feel stiff. She has tried muscle relaxants but does not like the way they make her feel.  She is currently taking Lyrica 50 mg TID with benefit. She states she has been taking 2 capsules together -  100 mg dose and this has been managing her pain well. She denies side effects at this dose. She was given a prescription for tramadol - 7 day supply for severe breakthrough pain. She is requesting a refill for this as she does occasionally have severe pain. Back Pain  This is a chronic problem. The current episode started more than 1 year ago. The problem occurs constantly. The problem is unchanged. The pain is present in the lumbar spine. The quality of the pain is described as aching. Radiates to: down left leg to the foot. The pain is at a severity of 6/10. The pain is moderate. The symptoms are aggravated by lying down and position (walking). Associated symptoms include numbness. Pertinent negatives include no chest pain, fever, paresthesias or tingling. Patient denies any new neurological symptoms. No bowel or bladder incontinence, no weakness, and no falling.       Past Medical History:   Diagnosis Date    Lumbar radiculopathy 2/15/2021    Lumbosacral spondylosis without myelopathy 2/15/2021    Morbid obesity ([facility] [address] 4/17/2015       Past Surgical History:   Procedure Laterality Date    CHOLECYSTECTOMY  1/2000    NERVE BLOCK N/A 01/29/2021    : Melita Jon About Running Out of Food in the Last Year: Not on file    Ran Out of Food in the Last Year: Not on file   Transportation Needs:     Lack of Transportation (Medical): Not on file    Lack of Transportation (Non-Medical): Not on file   Physical Activity:     Days of Exercise per Week: Not on file    Minutes of Exercise per Session: Not on file   Stress:     Feeling of Stress : Not on file   Social Connections:     Frequency of Communication with Friends and Family: Not on file    Frequency of Social Gatherings with Friends and Family: Not on file    Attends Mandaeism Services: Not on file    Active Member of [address] [place] [address] [employer] or Organizations: Not on file    Attends Club or Organization Meetings: Not on file    Marital Status: Not on file   Intimate Partner Violence:     Fear of Current or Ex-Partner: Not on file    Emotionally Abused: Not on file    Physically Abused: Not on file    Sexually Abused: Not on file   Housing Stability:     Unable to Pay for Housing in the Last Year: Not on file    Number of [place] in the Last Year: Not on file    Unstable Housing in the Last Year: Not on file       Review of Systems:  Review of Systems   Constitutional: Negative for chills and fever. Cardiovascular: Negative for chest pain and palpitations. Respiratory: Negative for cough and shortness of breath. Musculoskeletal: Positive for back pain. Gastrointestinal: Negative for constipation. Neurological: Positive for numbness. Negative for disturbances in coordination, loss of balance, paresthesias and tingling. Physical Exam:  /77   Pulse 95   Ht 5' 4\" (1.626 m)   Wt 259 lb (117.5 kg)   BMI 44.46 kg/m²     Physical Exam  HENT:      Head: Normocephalic. Pulmonary:      Effort: Pulmonary effort is normal.   Musculoskeletal:         General: Normal range of motion. Cervical back: Normal range of motion. Lumbar back: Tenderness present. Skin:     General: Skin is warm and dry. Neurological:      Mental Status: She is alert and oriented to person, place, and time. Record/Diagnostics Review:    Last neil 10/22 and was +Codeine - did get prescription from ER    Assessment:  Problem List Items Addressed This Visit     Lumbosacral spondylosis without myelopathy - Primary    Relevant Medications    traMADol (ULTRAM) 50 MG tablet    Lumbar radiculopathy    Relevant Medications    traMADol (ULTRAM) 50 MG tablet    Spinal stenosis of lumbar region    Relevant Medications    traMADol (ULTRAM) 50 MG tablet             Treatment Plan:  Plans to start PT  She did have a prescription for tramadol for severe break through pain - 7 day supply - would like a refill    UDS + codeine, morphine and hydromorphone - did have a script for Tylenol # 3 from ER visit 10/7  She feels the Lyrica is causing weight gain - she has gained 10 pounds   She cannot tolerate gabapentin  She is considering weight watchers  She does not want surgery.   She is not interested in injections  Discussed the importance of PT or exercise program -she has a referral from PCP and plans to schedule  Follow up in four weeks

## 2021-11-19 PROBLEM — M48.061 SPINAL STENOSIS OF LUMBAR REGION: Status: ACTIVE | Noted: 2021-11-19

## 2021-11-19 RX ORDER — TRAMADOL HYDROCHLORIDE 50 MG/1
50 TABLET ORAL 2 TIMES DAILY
Qty: 14 TABLET | Refills: 0 | Status: SHIPPED | OUTPATIENT
Start: 2021-11-19 | End: 2021-12-16 | Stop reason: SDUPTHER

## 2021-11-19 ASSESSMENT — ENCOUNTER SYMPTOMS
SHORTNESS OF BREATH: 0
CONSTIPATION: 0
COUGH: 0

## 2021-11-22 ENCOUNTER — TELEPHONE (OUTPATIENT)
Dept: PAIN MANAGEMENT | Age: 66
End: 2021-11-22

## 2021-11-22 DIAGNOSIS — M54.16 LUMBAR RADICULOPATHY: ICD-10-CM

## 2021-11-22 DIAGNOSIS — M48.061 SPINAL STENOSIS OF LUMBAR REGION, UNSPECIFIED WHETHER NEUROGENIC CLAUDICATION PRESENT: ICD-10-CM

## 2021-11-22 RX ORDER — PREGABALIN 100 MG/1
100 CAPSULE ORAL 2 TIMES DAILY
Qty: 60 CAPSULE | Refills: 1 | Status: SHIPPED | OUTPATIENT
Start: 2021-11-22 | End: 2021-12-16 | Stop reason: SDUPTHER

## 2021-12-14 ENCOUNTER — HOSPITAL ENCOUNTER (OUTPATIENT)
Age: 66
Setting detail: SPECIMEN
Discharge: HOME | End: 2021-12-14

## 2021-12-14 LAB
C-PEPTIDE: 4.2 NG/ML (ref 1.1–4.4)
CHOLESTEROL, FASTING: 127 MG/DL
CHOLESTEROL/HDL RATIO: 2.3
ESTIMATED AVERAGE GLUCOSE: 143 MG/DL
HBA1C MFR BLD: 6.6 % (ref 4–6)
HDLC SERPL-MCNC: 55 MG/DL
INSULIN COMMENT: NORMAL
INSULIN REFERENCE RANGE:: NORMAL
INSULIN: 24.6 MU/L
LDL CHOLESTEROL: 53 MG/DL (ref 0–130)
PTH INTACT: 119 PG/ML (ref 15–65)
TRIGLYCERIDE, FASTING: 96 MG/DL
VITAMIN D 25-HYDROXY: 19.4 NG/ML (ref 30–100)
VLDLC SERPL CALC-MCNC: NORMAL MG/DL (ref 1–30)

## 2021-12-15 LAB
ALBUMIN SERPL-MCNC: 4.1 G/DL (ref 3.5–5.2)
ALBUMIN/GLOBULIN RATIO: 1.2 (ref 1–2.5)
ALP BLD-CCNC: 86 U/L (ref 35–104)
ALT SERPL-CCNC: 15 U/L (ref 5–33)
ANION GAP SERPL CALCULATED.3IONS-SCNC: 13 MMOL/L (ref 9–17)
AST SERPL-CCNC: 20 U/L
BILIRUB SERPL-MCNC: 0.26 MG/DL (ref 0.3–1.2)
BUN BLDV-MCNC: 9 MG/DL (ref 8–23)
BUN/CREAT BLD: ABNORMAL (ref 9–20)
CALCIUM SERPL-MCNC: 10.9 MG/DL (ref 8.6–10.4)
CHLORIDE BLD-SCNC: 104 MMOL/L (ref 98–107)
CO2: 23 MMOL/L (ref 20–31)
CREAT SERPL-MCNC: 0.87 MG/DL (ref 0.5–0.9)
GFR AFRICAN AMERICAN: >60 ML/MIN
GFR NON-AFRICAN AMERICAN: >60 ML/MIN
GFR SERPL CREATININE-BSD FRML MDRD: ABNORMAL ML/MIN/{1.73_M2}
GFR SERPL CREATININE-BSD FRML MDRD: ABNORMAL ML/MIN/{1.73_M2}
GLUCOSE BLD-MCNC: 97 MG/DL (ref 70–99)
POTASSIUM SERPL-SCNC: 4.2 MMOL/L (ref 3.7–5.3)
SODIUM BLD-SCNC: 140 MMOL/L (ref 135–144)
TOTAL PROTEIN: 7.5 G/DL (ref 6.4–8.3)

## 2021-12-16 ENCOUNTER — VIRTUAL VISIT (OUTPATIENT)
Dept: PAIN MANAGEMENT | Age: 66
End: 2021-12-16
Payer: MEDICARE

## 2021-12-16 DIAGNOSIS — M48.061 SPINAL STENOSIS OF LUMBAR REGION, UNSPECIFIED WHETHER NEUROGENIC CLAUDICATION PRESENT: ICD-10-CM

## 2021-12-16 DIAGNOSIS — M47.817 LUMBOSACRAL SPONDYLOSIS WITHOUT MYELOPATHY: Primary | ICD-10-CM

## 2021-12-16 DIAGNOSIS — Z51.81 MEDICATION MONITORING ENCOUNTER: Chronic | ICD-10-CM

## 2021-12-16 DIAGNOSIS — M54.16 LUMBAR RADICULOPATHY: ICD-10-CM

## 2021-12-16 PROCEDURE — 99442 PR PHYS/QHP TELEPHONE EVALUATION 11-20 MIN: CPT | Performed by: NURSE PRACTITIONER

## 2021-12-16 RX ORDER — TRAMADOL HYDROCHLORIDE 50 MG/1
50 TABLET ORAL 2 TIMES DAILY
Qty: 14 TABLET | Refills: 0 | Status: SHIPPED | OUTPATIENT
Start: 2021-12-16 | End: 2021-12-23

## 2021-12-16 RX ORDER — PREGABALIN 100 MG/1
100 CAPSULE ORAL DAILY
Qty: 30 CAPSULE | Refills: 0 | Status: SHIPPED | OUTPATIENT
Start: 2021-12-16 | End: 2022-02-14 | Stop reason: SDUPTHER

## 2021-12-16 ASSESSMENT — ENCOUNTER SYMPTOMS
CONSTIPATION: 0
RESPIRATORY NEGATIVE: 1
BACK PAIN: 1

## 2021-12-16 NOTE — PROGRESS NOTES
Patient completed a telephone visit today to review medication contract. Chief Complaint   Patient presents with    Back Pain         [facility]   Patient complains of low back pain that radiates down both legs. MRI with severe stenosis. She did see NS but does not want surgery at this time. She had Caudal epidural 1/29/21 with 50% relief but still has some lingering pain in the left leg. She completed PT for her back with some benefit. She is also following with orthopedics for her left hip. Hip injections were offered but she declined. She has stopped meloxicam because it made her feel stiff. She has tried muscle relaxants but does not like the way they make her feel.  She is currently taking Lyrica 100 mg QD with benefit. Feels 100 mg dose has been managing her pain well. She denies side effects at this dose. She was given a prescription for tramadol - 7 day supply for severe breakthrough pain. She is requesting an increase in tramadol dose. Back Pain  This is a chronic problem. The current episode started more than 1 year ago. The problem occurs constantly. The problem is unchanged. The pain is present in the lumbar spine. The quality of the pain is described as aching. The pain does not radiate. The pain is at a severity of 4/10. The pain is mild. The symptoms are aggravated by position and standing (walking). Pertinent negatives include no chest pain, numbness, paresthesias or tingling. She has tried analgesics and bed rest (Lyrica) for the symptoms. The treatment provided mild relief. Patient denies any new neurological symptoms. No bowel or bladder incontinence, no weakness, and no falling. Pill count: appropriate due 12/19 #14 tabs    Controlled Substance Monitoring:    Acute and Chronic Pain Monitoring:   RX Monitoring 12/16/2021   Attestation -   Periodic Controlled Substance Monitoring No signs of potential drug abuse or diversion identified.            Past Medical History:   Diagnosis Date  Lumbar radiculopathy 2/15/2021    Lumbosacral spondylosis without myelopathy 2/15/2021    Morbid obesity ([address] 2015       Past Surgical History:   Procedure Laterality Date    CHOLECYSTECTOMY  2000    NERVE BLOCK N/A 2021    : EPIDURAL STEROID INJECTION - CAUDAL (N/A )    PAIN MANAGEMENT PROCEDURE  2020    EPIDURAL STEROID INJECTION    PAIN MANAGEMENT PROCEDURE N/A 2020    EPIDURAL STEROID INJECTION performed by Julisa Coello MD at [address] [place] [address] N/A 2021    EPIDURAL STEROID INJECTION - CAUDAL performed by Julisa Coello MD at [id number] [place] [address].       No Known Allergies      Current Outpatient Medications:     pregabalin (LYRICA) 100 MG capsule, Take 1 capsule by mouth 2 times daily for 30 days. , Disp: 60 capsule, Rfl: 1    vitamin D (ERGOCALCIFEROL) 1.25 MG (81626 UT) CAPS capsule, Take 50,000 Units by mouth once a week , Disp: , Rfl:     acetaminophen (TYLENOL) 325 MG tablet, Take 650 mg by mouth every 6 hours as needed for Pain, Disp: , Rfl:     Omega-3 Fatty Acids (OMEGA 3 PO), Take by mouth daily , Disp: , Rfl:     Family History   Problem Relation Age of Onset    Stroke Father        Social History     Socioeconomic History    Marital status: Single     Spouse name: Not on file    Number of children: Not on file    Years of education: Not on file    Highest education level: Not on file   Occupational History    Not on file   Tobacco Use    Smoking status: Former Smoker     Types: Cigarettes     Start date: 1990     Quit date: 1999     Years since quittin.9    Smokeless tobacco: Never Used   Substance and Sexual Activity    Alcohol use: No    Drug use: No    Sexual activity: Never   Other Topics Concern    Not on file   Social History Narrative    Not on file     Social Determinants of Health     Financial Resource Strain:     Difficulty of Paying Living Expenses: Not on file   Food Insecurity:     Worried About [facility] in the Last Year: Not on file    Jovanny of Food in the Last Year: Not on file   Transportation Needs:     Lack of Transportation (Medical): Not on file    Lack of Transportation (Non-Medical): Not on file   Physical Activity:     Days of Exercise per Week: Not on file    Minutes of Exercise per Session: Not on file   Stress:     Feeling of Stress : Not on file   Social Connections:     Frequency of Communication with Friends and Family: Not on file    Frequency of Social Gatherings with Friends and Family: Not on file    Attends Restorationist Services: Not on file    Active Member of [address] [place] [address] or Organizations: Not on file    Attends Club or Organization Meetings: Not on file    Marital Status: Not on file   Intimate Partner Violence:     Fear of Current or Ex-Partner: Not on file    Emotionally Abused: Not on file    Physically Abused: Not on file    Sexually Abused: Not on file   Housing Stability:     Unable to Pay for Housing in the Last Year: Not on file    Number of [place] in the Last Year: Not on file    Unstable Housing in the Last Year: Not on file       Review of Systems:  Review of Systems   Constitutional: Negative. Cardiovascular: Negative for chest pain and palpitations. Respiratory: Negative. Musculoskeletal: Positive for back pain. Gastrointestinal: Negative for constipation. Neurological: Negative. Negative for disturbances in coordination, loss of balance, numbness, paresthesias and tingling. Physical Exam:  There were no vitals taken for this visit. Physical Exam  Neurological:      Mental Status: She is alert.    Psychiatric:         Mood and Affect: Mood normal.         Record/Diagnostics Review:    Last neil 10/221 and was + codeine, hydromorphone, morphine - she did get codeine from ER visit    Assessment:  Problem List Items Addressed This Visit     Medication monitoring encounter (Chronic)    Lumbosacral spondylosis without myelopathy - Primary    Relevant Medications    traMADol (ULTRAM) 50 MG tablet    Lumbar radiculopathy    Relevant Medications    traMADol (ULTRAM) 50 MG tablet    pregabalin (LYRICA) 100 MG capsule    Spinal stenosis of lumbar region    Relevant Medications    traMADol (ULTRAM) 50 MG tablet    pregabalin (LYRICA) 100 MG capsule             Treatment Plan:  Patient relates current medications are helping the pain. Patient reports taking pain medications as prescribed, denies obtaining medications from different sources and denies use of illegal drugs. Patient denies side effects from medications like nausea, vomiting, constipation or drowsiness. Patient reports current activities of daily living are possible due to medications and would like to continue them. As always, we encourage daily stretching and strengthening exercises, and recommend minimizing use of pain medications unless patient cannot get through daily activities due to pain. Contract requirements met. Continue opioid therapy. Script written for tramadol  She is taking Lyrica 100 mg only QD now - feels it is causing weight gain so she reduced the dose from BID  Follow up appointment made for 4 weeks    Gianni Molina, was evaluated through a synchronous (real-time) audio-video encounter. The patient (or guardian if applicable) is aware that this is a billable service. Verbal consent to proceed has been obtained within the past 12 months. The visit was conducted pursuant to the emergency declaration under the [address] [place] [address], [address] [place] [address] authority and the [employer] and [facility] General Act. Patient identification was verified, and a caregiver was present when appropriate. The patient was located in a state where the provider was credentialed to provide care.     Total time spent for this encounter: 20 minutes      --KEY Ramey CNP on 12/16/2021 at 4:08

## 2022-01-13 ENCOUNTER — OFFICE VISIT (OUTPATIENT)
Dept: ORTHOPEDIC SURGERY | Age: 67
End: 2022-01-13
Payer: MEDICARE

## 2022-01-13 ENCOUNTER — TELEPHONE (OUTPATIENT)
Dept: INTERVENTIONAL RADIOLOGY/VASCULAR | Age: 67
End: 2022-01-13

## 2022-01-13 VITALS — WEIGHT: 259 LBS | HEIGHT: 64 IN | BODY MASS INDEX: 44.22 KG/M2

## 2022-01-13 DIAGNOSIS — M48.061 SPINAL STENOSIS OF LUMBAR REGION, UNSPECIFIED WHETHER NEUROGENIC CLAUDICATION PRESENT: ICD-10-CM

## 2022-01-13 DIAGNOSIS — M70.61 GREATER TROCHANTERIC BURSITIS OF RIGHT HIP: ICD-10-CM

## 2022-01-13 DIAGNOSIS — M54.16 LUMBAR RADICULAR PAIN: ICD-10-CM

## 2022-01-13 DIAGNOSIS — M16.11 PRIMARY OSTEOARTHRITIS OF RIGHT HIP: Primary | ICD-10-CM

## 2022-01-13 DIAGNOSIS — M47.817 LUMBOSACRAL SPONDYLOSIS WITHOUT MYELOPATHY: ICD-10-CM

## 2022-01-13 DIAGNOSIS — M25.551 RIGHT HIP PAIN: ICD-10-CM

## 2022-01-13 PROCEDURE — 20610 DRAIN/INJ JOINT/BURSA W/O US: CPT | Performed by: PHYSICIAN ASSISTANT

## 2022-01-13 PROCEDURE — 99214 OFFICE O/P EST MOD 30 MIN: CPT | Performed by: PHYSICIAN ASSISTANT

## 2022-01-13 RX ORDER — BETAMETHASONE SODIUM PHOSPHATE AND BETAMETHASONE ACETATE 3; 3 MG/ML; MG/ML
12 INJECTION, SUSPENSION INTRA-ARTICULAR; INTRALESIONAL; INTRAMUSCULAR; SOFT TISSUE ONCE
Status: COMPLETED | OUTPATIENT
Start: 2022-01-13 | End: 2022-01-13

## 2022-01-13 RX ORDER — LIDOCAINE HYDROCHLORIDE 10 MG/ML
4 INJECTION, SOLUTION INFILTRATION; PERINEURAL ONCE
Status: COMPLETED | OUTPATIENT
Start: 2022-01-13 | End: 2022-01-13

## 2022-01-13 RX ADMIN — BETAMETHASONE SODIUM PHOSPHATE AND BETAMETHASONE ACETATE 12 MG: 3; 3 INJECTION, SUSPENSION INTRA-ARTICULAR; INTRALESIONAL; INTRAMUSCULAR; SOFT TISSUE at 11:50

## 2022-01-13 RX ADMIN — LIDOCAINE HYDROCHLORIDE 4 ML: 10 INJECTION, SOLUTION INFILTRATION; PERINEURAL at 11:50

## 2022-01-13 NOTE — PROGRESS NOTES
[address] [facility], [address] [place], [address] [place] [address], [address] [facility]           Dept Phone: [phone]           Dept Fax:  [phone]           Valdemar Billy          Dept Phone: [phone]           Dept Fax:  [phone]      Chief Compliant:  Chief Complaint   Patient presents with    Hip Pain     right        History of Present Illness: This is a 73 y.o. female who presents to the clinic today for evaluation of right hip. Patient reports that this pain started out of the blue on Sunday denies any preceding injury or trauma. She does admit to a fall landing on her right side back in October 2021 but states she actually did not have much pain at all for this right hip. Of note patient with history of severe stenosis of the lumbar spine however declined surgical intervention with neurosurgery and Dr. Thania Grijalva in the past.  She is currently seeing pain management as well for this issue and has undergone epidural steroid injections most recently in January 2021. She is taking Lyrica 100 mg daily per pain management as well. Patient reports pain in the right hip is most severe in the right groin/lateral hip area. She states pain is aggravated by laying on either the left or the right side. She also has a great deal of pain when transitioning from a lying to a seated position and from a seated to a standing position. Pain is also aggravated by any prolonged period of standing or walking. Patient is currently taking Tylenol which does provide mild relief of pain. She denies any new swelling, warmth, fever or chills of this right hip. Patient continues to have chronic low back pain and radicular symptoms into bilateral lower extremities including bilateral numbness and tingling.     Past History:    Current Outpatient Medications:    pregabalin (LYRICA) 100 MG capsule, Take 1 capsule by mouth daily for 30 days. , Disp: 30 capsule, Rfl: 0    vitamin D (ERGOCALCIFEROL) 1.25 MG (57656 UT) CAPS capsule, Take 50,000 Units by mouth once a week , Disp: , Rfl:     acetaminophen (TYLENOL) 325 MG tablet, Take 650 mg by mouth every 6 hours as needed for Pain, Disp: , Rfl:     Omega-3 Fatty Acids (OMEGA 3 PO), Take by mouth daily , Disp: , Rfl:   No Known Allergies  Social History     Socioeconomic History    Marital status: Single     Spouse name: Not on file    Number of children: Not on file    Years of education: Not on file    Highest education level: Not on file   Occupational History    Not on file   Tobacco Use    Smoking status: Former Smoker     Types: Cigarettes     Start date: 1990     Quit date: 1999     Years since quittin.0    Smokeless tobacco: Never Used   Substance and Sexual Activity    Alcohol use: No    Drug use: No    Sexual activity: Never   Other Topics Concern    Not on file   Social History Narrative    Not on file     Social Determinants of Health     Financial Resource Strain:     Difficulty of Paying Living Expenses: Not on file   Food Insecurity:     Worried About Running Out of Food in the Last Year: Not on file    Jovanny of Food in the Last Year: Not on file   Transportation Needs:     Lack of Transportation (Medical): Not on file    Lack of Transportation (Non-Medical):  Not on file   Physical Activity:     Days of Exercise per Week: Not on file    Minutes of Exercise per Session: Not on file   Stress:     Feeling of Stress : Not on file   Social Connections:     Frequency of Communication with Friends and Family: Not on file    Frequency of Social Gatherings with Friends and Family: Not on file    Attends Rastafarian Services: Not on file    Active Member of Clubs or Organizations: Not on file    Attends Club or Organization Meetings: Not on file    Marital Status: Not on file Intimate Partner Violence:     Fear of Current or Ex-Partner: Not on file    Emotionally Abused: Not on file    Physically Abused: Not on file    Sexually Abused: Not on file   Housing Stability:     Unable to Pay for Housing in the Last Year: Not on file    Number of [place] in the Last Year: Not on file    Unstable Housing in the Last Year: Not on file     Past Medical History:   Diagnosis Date    Lumbar radiculopathy 2/15/2021    Lumbosacral spondylosis without myelopathy 2/15/2021    Morbid obesity ([facility] [address] 4/17/2015     Past Surgical History:   Procedure Laterality Date    CHOLECYSTECTOMY  1/2000    NERVE BLOCK N/A 01/29/2021    : EPIDURAL STEROID INJECTION - CAUDAL (N/A )    PAIN MANAGEMENT PROCEDURE  12/04/2020    EPIDURAL STEROID INJECTION    PAIN MANAGEMENT PROCEDURE N/A 12/4/2020    EPIDURAL STEROID INJECTION performed by Ayush Guillen MD at [address] [place] [address] N/A 1/29/2021    EPIDURAL STEROID INJECTION - CAUDAL performed by Ayush Guillen MD at [id number] [place] [address].     Family History   Problem Relation Age of Onset    Stroke Father         Review of Systems   Constitutional: Negative for fever, chills, sweats. Eyes: Negative for changes in vision, or pain. HENT: Negative for ear ache, epistaxis, or sore throat. Respiratory/Cardio: Negative for Chest pain, palpitations, SOB, or cough. Gastrointestinal: Negative for abdominal pain, N/V/D. Genitourinary: Negative for dysuria, frequency, urgency, or hematuria. Neurological: Negative for headache, numbness, or weakness. Integumentary: Negative for rash, itching, laceration, or abrasion. Musculoskeletal: Positive for Hip Pain (right)       Physical Exam:  Constitutional: Patient is oriented to person, place, and time. Patient appears well-developed and well nourished.    HENT: Negative otherwise noted  Head: Normocephalic and Atraumatic  Nose: Normal  Eyes: Conjunctivae and EOM are normal  Neck: Normal range of motion Neck supple. Respiratory/Cardio: Effort normal. No respiratory distress. Musculoskeletal:    LUMBAR/SACRAL EXAMINATION:  · Inspection: Local inspection shows no step-off or bruising. Lumbar alignment is normal.  Sagittal and Coronal balance is neutral.      · Palpation:   Diffuse lumbar paraspinal muscular tenderness. No midline tenderness. Moderate tenderness over the right greater troches. Minimal tenderness to the SI joint. · Range of Motion: Lumbar flexion, extension and rotation are mildly limited due to pain. · Strength:   Strength is diffusely decreased but similar on bilateral lower extremities. · Special Tests:   Straight leg raise and crossed SLR positive bilateral lower extremities. Leg length and pelvis level.  0 out of 5 Nishi's signs. · Skin: There are no rashes, ulcerations or lesions. · Reflexes: Reflexes are symmetrically 2+ at the patellar and ankle tendons. Clonus absent bilaterally at the feet. · Gait & station: Antalgic, patient ambulates without assistive devices  · Additional Examinations:   · RIGHT LOWER EXTREMITY: Inspection/examination of the right lower extremity does not show any tenderness, deformity or injury. Range of motion is unremarkable. There is no gross instability. There are no rashes, ulcerations or lesions. Strength and tone are normal.  · LEFT LOWER EXTREMITY:  Inspection/examination of the left lower extremity does not show any tenderness, deformity or injury. Range of motion is unremarkable. There is no gross instability. There are no rashes, ulcerations or lesions. Strength and tone are normal.     right Hip    Tenderness: Severe tenderness over the right greater trochanter. Mild tenderness over the proximal IT band.   No tenderness to the anterior posterior hip       Range of Motion:      Extension: 10     Flexion: 120     Internal Rotation: 30     External Rotation: 40     Abduction: 40     Adduction:  30    Patient maintains fairly good range of motion including full internal and external rotation but has severe pain with internal rotation and AB duction. Muscle Strength      Abduction: 5/5     Adduction: 5/5     Flexion: 4/5         Gait: Antalgic   Clayton Test: Negative   Bradley Test: Positive         Neurological: Patient is alert and oriented to person, place, and time. Normal strenght. No sensory deficit. Skin: Skin is warm and dry  Psychiatric: Behavior is normal. Thought content normal.  Nursing note and vitals reviewed. Labs and Imaging:     XR taken today:  No results found. X-rays taken in clinic today and preliminarily reviewed by me 1/13/22:  AP pelvis lateral view of the right hip demonstrates mild to moderate femoral acetabular degenerative changes right slightly greater than left. Small cam and pincer osteophytes present. No evidence of subluxation, dislocation or acute fracture. Visualized portions of the lumbosacral spine demonstrate severe degenerative changes and disc base narrowing. No orders of the defined types were placed in this encounter. Assessment and Plan:  1. Right hip pain    2. Primary osteoarthritis of right hip    3. Greater trochanteric bursitis of right hip    4. Lumbosacral spondylosis without myelopathy    5. Spinal stenosis of lumbar region, unspecified whether neurogenic claudication present    6. Lumbar radicular pain          PLAN:  This is a 73 y.o. female who presents to the clinic today for evaluation of 5-day history of right hip pain which began on Sunday 1/9/2022 without any preceding injury or trauma. Patient's pain is most severe to the right groin making a strong case for osteoarthritis however she does have some evidence of trochanteric bursitis as well. There is no evidence of septic or gouty arthropathy in this right hip. No evidence of of cauda equina or epidural abscess based on history and examination.   Complicating patient's area of pain as she does have a history of severe stenosis in the lumbar degenerative changes which could be contributing to some of her pain as well she is currently seeing pain management for her chronic low back issues. 1.  Radiographs today compared with x-rays in April, 2021 demonstrated osteoarthritis of his right hip without any significant progression, AVN or femoral head collapse over that 9-month time frame. 2.  Had lengthy discussion with patient on treatment options available to her. She is educated that I believe she would benefit from a trochanteric bursal injection today given her significant tenderness over the right greater trochanter and she was agreeable this injection was done as outlined below. 3. Patient is educated that she does have some osteoarthritis and I do wonder if this is contributing to some of her pain as well given the groin pain and the pain with range of motion. She is educated that should this bursitis injection not give her adequate relief would recommend follow-up with interventional radiology for an intra-articular right hip injection she was agreeable to this plan as well. 4.  Referral to interventional radiology for right hip injection is given and patient is informed that should the injection we gave her today not provide adequate relief she should continue with plan follow-up for intra-articular injection which is different from the bursal injection we did today she was agreeable. 5. Continued follow up with Pain management is recommend for her chronic low back issues. 6. RTC on as-needed basis however patient may call or return sooner for any questions or concerns.        Procedure: right trochanteric bursal celestone injection  An informed verbal consent for the procedure was obtained and risks including, but not limited to: allergy to medications, injection, bleeding, stiffness of joint, recurrence of symptoms, loss of function, swelling, drainage, irrigation, need for surgery and pseudo-septic inflammation, were explained to the patient. Also, discussed was the potential for further injections, irrigation and debridement and surgery. Alternate means of treatment have also been discussed with the patient. Administrations This Visit     betamethasone acetate-betamethasone sodium phosphate (CELESTONE) injection 12 mg     Admin Date  01/13/2022  11:50 Action  Given Dose  12 mg Route  Intra-artICUlar Site  Hip Right Administered By  Rebecca Randolph LPN    Ordering Provider: SHILO Valdovinos. [address]: [id number]    Lot#: [id number]    : [employer]    Patient Supplied?: No          lidocaine 1 % injection 4 mL     Admin Date  01/13/2022  11:50 Action  Given Dose  4 mL Route  Intra-artICUlar Site  Hip Right Administered By  Rebecca Randolph LPN    Ordering Provider: Carrillo Valdovinos[address] [place] [address]: [id number]    Lot#: [id number]    : [facility]    Patient Supplied?: No                    Following an appropriate discussion with the patient regarding the risks and benefits of the procedure she consented to proceed. her right trochanteric bursa was prepped using betadine solution. Using aseptic technique and through a lateral approach, her right trochanteric bursa was injected superficially with 4 cc of 1% lidocaine without epinephrine and subsequently with 2 cc of 6 mg/mL Celestone into the bursal space. A band aid was applied to the injection site. she tolerated the injection with no immediate adverse reactions. Please note that this chart was generated using voice recognition Dragon dictation software. Although every effort was made to ensure the accuracy of this automated transcription, some errors in transcription may have occurred.

## 2022-01-20 ENCOUNTER — TELEPHONE (OUTPATIENT)
Dept: PAIN MANAGEMENT | Age: 67
End: 2022-01-20

## 2022-01-20 NOTE — TELEPHONE ENCOUNTER
Patient left a msg stating that she would like another refill from Dr. Kelsey Morales on the Tramadol, at least a week supply, pt has VV on 01/28/2022 with Dr. Kelsey Morales. Please advise.

## 2022-01-28 ENCOUNTER — TELEPHONE (OUTPATIENT)
Dept: ADMINISTRATIVE | Age: 67
End: 2022-01-28

## 2022-02-08 ENCOUNTER — TELEPHONE (OUTPATIENT)
Dept: INTERVENTIONAL RADIOLOGY/VASCULAR | Age: 67
End: 2022-02-08

## 2022-02-09 ENCOUNTER — TELEPHONE (OUTPATIENT)
Dept: INTERVENTIONAL RADIOLOGY/VASCULAR | Age: 67
End: 2022-02-09

## 2022-02-09 NOTE — TELEPHONE ENCOUNTER
The patient returned our call about scheduling a hip injection. She would like to hold off on this procedure. If she decides she would like the injection, she will call us back.

## 2022-02-09 NOTE — TELEPHONE ENCOUNTER
Called pt to see if she wanted to  her appt to 02/10/2022 instead of 02/14/2022. **Patient did not answer, left msg.

## 2022-02-11 ENCOUNTER — HOSPITAL ENCOUNTER (OUTPATIENT)
Age: 67
Setting detail: SPECIMEN
Discharge: HOME | End: 2022-02-11

## 2022-02-11 LAB
ALBUMIN SERPL-MCNC: 4.2 G/DL (ref 3.5–5.2)
ALBUMIN/GLOBULIN RATIO: 1.4 (ref 1–2.5)
ALP BLD-CCNC: 88 U/L (ref 35–104)
ALT SERPL-CCNC: 19 U/L (ref 5–33)
ANION GAP SERPL CALCULATED.3IONS-SCNC: 13 MMOL/L (ref 9–17)
AST SERPL-CCNC: 21 U/L
BILIRUB SERPL-MCNC: 0.25 MG/DL (ref 0.3–1.2)
BUN BLDV-MCNC: 12 MG/DL (ref 8–23)
BUN/CREAT BLD: ABNORMAL (ref 9–20)
CALCIUM IONIZED: 1.5 MMOL/L (ref 1.13–1.33)
CALCIUM SERPL-MCNC: 11.1 MG/DL (ref 8.6–10.4)
CHLORIDE BLD-SCNC: 108 MMOL/L (ref 98–107)
CHOLESTEROL, FASTING: 119 MG/DL
CHOLESTEROL/HDL RATIO: 2.1
CO2: 24 MMOL/L (ref 20–31)
CREAT SERPL-MCNC: 0.88 MG/DL (ref 0.5–0.9)
ESTIMATED AVERAGE GLUCOSE: 137 MG/DL
GFR AFRICAN AMERICAN: >60 ML/MIN
GFR NON-AFRICAN AMERICAN: >60 ML/MIN
GFR SERPL CREATININE-BSD FRML MDRD: ABNORMAL ML/MIN/{1.73_M2}
GFR SERPL CREATININE-BSD FRML MDRD: ABNORMAL ML/MIN/{1.73_M2}
GLUCOSE BLD-MCNC: 101 MG/DL (ref 70–99)
HBA1C MFR BLD: 6.4 % (ref 4–6)
HDLC SERPL-MCNC: 56 MG/DL
LDL CHOLESTEROL: 44 MG/DL (ref 0–130)
POTASSIUM SERPL-SCNC: 4.8 MMOL/L (ref 3.7–5.3)
PTH INTACT: 113.1 PG/ML (ref 15–65)
SODIUM BLD-SCNC: 145 MMOL/L (ref 135–144)
T3 FREE: 3.58 PG/ML (ref 2.02–4.43)
THYROXINE, FREE: 1.31 NG/DL (ref 0.93–1.7)
TOTAL PROTEIN: 7.2 G/DL (ref 6.4–8.3)
TRIGLYCERIDE, FASTING: 95 MG/DL
TSH SERPL DL<=0.05 MIU/L-ACNC: 1.8 MIU/L (ref 0.3–5)
VITAMIN D 25-HYDROXY: 23.9 NG/ML (ref 30–100)
VLDLC SERPL CALC-MCNC: NORMAL MG/DL (ref 1–30)

## 2022-02-12 LAB — C-PEPTIDE: 3.9 NG/ML (ref 1.1–4.4)

## 2022-02-14 ENCOUNTER — OFFICE VISIT (OUTPATIENT)
Dept: PAIN MANAGEMENT | Age: 67
End: 2022-02-14
Payer: MEDICARE

## 2022-02-14 VITALS
DIASTOLIC BLOOD PRESSURE: 71 MMHG | HEART RATE: 87 BPM | BODY MASS INDEX: 42.65 KG/M2 | SYSTOLIC BLOOD PRESSURE: 133 MMHG | HEIGHT: 65 IN | WEIGHT: 256 LBS

## 2022-02-14 DIAGNOSIS — Z79.891 ENCOUNTER FOR LONG-TERM OPIATE ANALGESIC USE: ICD-10-CM

## 2022-02-14 DIAGNOSIS — M48.061 SPINAL STENOSIS OF LUMBAR REGION, UNSPECIFIED WHETHER NEUROGENIC CLAUDICATION PRESENT: ICD-10-CM

## 2022-02-14 DIAGNOSIS — G89.29 OTHER CHRONIC PAIN: Primary | ICD-10-CM

## 2022-02-14 DIAGNOSIS — M54.16 LUMBAR RADICULOPATHY: ICD-10-CM

## 2022-02-14 PROCEDURE — 99213 OFFICE O/P EST LOW 20 MIN: CPT | Performed by: PAIN MEDICINE

## 2022-02-14 RX ORDER — PREGABALIN 100 MG/1
100 CAPSULE ORAL DAILY
Qty: 30 CAPSULE | Refills: 2 | Status: SHIPPED | OUTPATIENT
Start: 2022-02-15 | End: 2022-03-17 | Stop reason: DRUGHIGH

## 2022-02-14 RX ORDER — TRAMADOL HYDROCHLORIDE 50 MG/1
50 TABLET ORAL 2 TIMES DAILY PRN
Qty: 18 TABLET | Refills: 0 | Status: CANCELLED | OUTPATIENT
Start: 2022-02-14 | End: 2022-02-17

## 2022-02-14 ASSESSMENT — ENCOUNTER SYMPTOMS
BACK PAIN: 1
BOWEL INCONTINENCE: 0

## 2022-02-14 NOTE — PROGRESS NOTES
HPI:     Back Pain  This is a chronic problem. The current episode started more than 1 year ago. The problem occurs constantly. The problem has been gradually improving since onset. The pain is present in the lumbar spine and gluteal. The quality of the pain is described as aching. The pain radiates to the right thigh, left thigh, right knee, right foot, left knee and left foot. The pain is at a severity of 3/10. The pain is mild. The pain is worse during the day. The symptoms are aggravated by position, standing, twisting and bending. Stiffness is present at night. Associated symptoms include leg pain, numbness and weakness. Pertinent negatives include no bladder incontinence, bowel incontinence, dysuria, pelvic pain or tingling. She has tried muscle relaxant, NSAIDs, analgesics, heat and home exercises (physical therapy) for the symptoms. The treatment provided moderate (exercises) relief. MRI lumbar spine with multilevel stenosis. She has seen surgeon in the past, nothing surgical planned. Did have epidural in the past with some benefit. Takes Lyrica with some benefit. Tramadol in the past with some benefit. X-ray of the hip with some degenerative changes. Recently had a hip injection with minimal benefit. She has been taking over-the-counter NSAIDs with some benefit. Patient denies any new neurological symptoms. Nobowel or bladder incontinence, no weakness, and no falling. Review of OARRS does not show any aberrant prescription behavior.      Past Medical History:   Diagnosis Date    Lumbar radiculopathy 2/15/2021    Lumbosacral spondylosis without myelopathy 2/15/2021    Morbid obesity ([facility] [address] 4/17/2015       Past Surgical History:   Procedure Laterality Date    CHOLECYSTECTOMY  1/2000    NERVE BLOCK N/A 01/29/2021    : EPIDURAL STEROID INJECTION - CAUDAL (N/A )    PAIN MANAGEMENT PROCEDURE  12/04/2020    EPIDURAL STEROID INJECTION    PAIN MANAGEMENT PROCEDURE N/A 12/4/2020    EPIDURAL STEROID INJECTION performed by Luis Fernando Vázquez MD at [address] [facility] N/A 2021    EPIDURAL STEROID INJECTION - CAUDAL performed by Luis Fernando Vázquez MD at [id number] [place] [address].       No Known Allergies      Current Outpatient Medications:     Misc Natural Products (Roanna Martin MSM FORMULA) TABS, Take by mouth, Disp: , Rfl:     ALFALFA PO, Take by mouth, Disp: , Rfl:     pregabalin (LYRICA) 100 MG capsule, Take 1 capsule by mouth daily for 30 days. , Disp: 30 capsule, Rfl: 0    vitamin D (ERGOCALCIFEROL) 1.25 MG (42435 UT) CAPS capsule, Take 50,000 Units by mouth once a week , Disp: , Rfl:     Omega-3 Fatty Acids (OMEGA 3 PO), Take by mouth daily , Disp: , Rfl:     acetaminophen (TYLENOL) 325 MG tablet, Take 650 mg by mouth every 6 hours as needed for Pain (Patient not taking: Reported on 2022), Disp: , Rfl:     Family History   Problem Relation Age of Onset    Stroke Father        Social History     Socioeconomic History    Marital status: Single     Spouse name: Not on file    Number of children: Not on file    Years of education: Not on file    Highest education level: Not on file   Occupational History    Not on file   Tobacco Use    Smoking status: Former Smoker     Types: Cigarettes     Start date: 1990     Quit date: 1999     Years since quittin.1    Smokeless tobacco: Never Used   Substance and Sexual Activity    Alcohol use: No    Drug use: No    Sexual activity: Never   Other Topics Concern    Not on file   Social History Narrative    Not on file     Social Determinants of Health     Financial Resource Strain:     Difficulty of Paying Living Expenses: Not on file   Food Insecurity:     Worried About Running Out of Food in the Last Year: Not on file    Jovanny of Food in the Last Year: Not on file   Transportation Needs:     Lack of Transportation (Medical): Not on file    Lack of Transportation (Non-Medical):  Not on file Physical Activity:     Days of Exercise per Week: Not on file    Minutes of Exercise per Session: Not on file   Stress:     Feeling of Stress : Not on file   Social Connections:     Frequency of Communication with Friends and Family: Not on file    Frequency of Social Gatherings with Friends and Family: Not on file    Attends Druze Services: Not on file    Active Member of Clubs or Organizations: Not on file    Attends Club or Organization Meetings: Not on file    Marital Status: Not on file   Intimate Partner Violence:     Fear of Current or Ex-Partner: Not on file    Emotionally Abused: Not on file    Physically Abused: Not on file    Sexually Abused: Not on file   Housing Stability:     Unable to Pay for Housing in the Last Year: Not on file    Number of [place] in the Last Year: Not on file    Unstable Housing in the Last Year: Not on file       Review of Systems:  Review of Systems   Musculoskeletal: Positive for back pain. Gastrointestinal: Negative for bowel incontinence. Genitourinary: Negative for bladder incontinence, dysuria and pelvic pain. Neurological: Positive for numbness and weakness. Negative for tingling. Physical Exam:  /71   Pulse 87   Ht 5' 4.5\" (1.638 m)   Wt 256 lb (116.1 kg)   BMI 43.26 kg/m²     Physical Exam  Constitutional:       Appearance: Normal appearance. Pulmonary:      Effort: Pulmonary effort is normal.   Neurological:      Mental Status: She is alert. Psychiatric:         Attention and Perception: Attention and perception normal.         Mood and Affect: Mood and affect normal.         Record/Diagnostics Review:    As above, I did review the imaging    Orders:  No orders of the defined types were placed in this encounter. No orders of the defined types were placed in this encounter. Assessment:  1. Other chronic pain    2. Lumbar radiculopathy    3.  Spinal stenosis of lumbar region, unspecified whether neurogenic claudication present    4. Encounter for long-term opiate analgesic use        Treatment Plan:  DISCUSSION: Treatment options discussed with patient and all questions answered to patient's satisfaction. OARRS Review: Reviewed and acceptable for medications prescribed. TREATMENT OPTIONS:     Discussed different treatment options including continued conservative care such as physical therapy, chiropractic care, acupuncture. Discussed different interventional options such as epidural steroids or medial branch blocks. Also discussed neuromodulation in the form of spinal cord stimulation. Also discussed surgical evaluation. Continue current medication management, has been stable and compliant. 3 month f/u Rosa Hilario M.D. I have reviewed the chief complaint and history of present illness (including ROS and PFSH) and vital documentation by my staff and I agree with their documentation and have added where applicable.

## 2022-03-10 ENCOUNTER — TELEPHONE (OUTPATIENT)
Dept: PAIN MANAGEMENT | Age: 67
End: 2022-03-10

## 2022-03-17 ENCOUNTER — TELEMEDICINE (OUTPATIENT)
Dept: PAIN MANAGEMENT | Age: 67
End: 2022-03-17
Payer: OTHER MISCELLANEOUS

## 2022-03-17 DIAGNOSIS — M54.16 LUMBAR RADICULOPATHY: ICD-10-CM

## 2022-03-17 DIAGNOSIS — M48.061 SPINAL STENOSIS OF LUMBAR REGION, UNSPECIFIED WHETHER NEUROGENIC CLAUDICATION PRESENT: ICD-10-CM

## 2022-03-17 PROCEDURE — 99213 OFFICE O/P EST LOW 20 MIN: CPT | Performed by: NURSE PRACTITIONER

## 2022-03-17 RX ORDER — PREGABALIN 100 MG/1
100 CAPSULE ORAL 2 TIMES DAILY
Qty: 60 CAPSULE | Refills: 0 | Status: SHIPPED | OUTPATIENT
Start: 2022-03-28 | End: 2022-03-31 | Stop reason: SDUPTHER

## 2022-03-17 ASSESSMENT — ENCOUNTER SYMPTOMS
SHORTNESS OF BREATH: 0
BACK PAIN: 1
CONSTIPATION: 0
COUGH: 0

## 2022-03-17 NOTE — PROGRESS NOTES
Chief Complaint: back pain    [facility] Patient complains of low back pain that radiates down both legs. MRI with severe stenosis. She did see NS but does not want surgery at this time. She had Caudal epidural 1/29/21 with 50% relief but still has some lingering pain in the left leg. She completed PT for her back with some benefit. She is also following with orthopedics for her left hip. She did have a hip injection 1/2022 with some relief. She has stopped meloxicam because it made her feel stiff. She has tried muscle relaxants but does not like the way they make her feel.  She is currently taking Lyrica 100 mg QD with some benefit. Feels 100 mg dose has been managing her pain well most days but there are some days she feels she needs a second dose. She denies side effects with Lyrica. Back Pain  This is a chronic problem. The current episode started more than 1 year ago. The problem occurs constantly. The problem is unchanged. The pain is present in the lumbar spine. The quality of the pain is described as aching. Radiates to: down left leg to the foot. The pain is at a severity of 3/10. The pain is mild. The symptoms are aggravated by position and standing. Associated symptoms include numbness and tingling. Pertinent negatives include no chest pain or fever. She has tried bed rest (Lyrica) for the symptoms. The treatment provided mild relief. Patient denies any new neurological symptoms. No bowel or bladder incontinence, no weakness, and no falling.     Past Medical History:   Diagnosis Date    Lumbar radiculopathy 2/15/2021    Lumbosacral spondylosis without myelopathy 2/15/2021    Morbid obesity ([address] 4/17/2015       Past Surgical History:   Procedure Laterality Date    CHOLECYSTECTOMY  1/2000    NERVE BLOCK N/A 01/29/2021    : EPIDURAL STEROID INJECTION - CAUDAL (N/A )    PAIN MANAGEMENT PROCEDURE  12/04/2020    EPIDURAL STEROID INJECTION    PAIN MANAGEMENT PROCEDURE N/A 12/4/2020    EPIDURAL STEROID INJECTION performed by Leti Ervin MD at [address] [facility] N/A 2021    EPIDURAL STEROID INJECTION - CAUDAL performed by Leti Ervin MD at [id number] [place] [address].       No Known Allergies      Current Outpatient Medications:     pregabalin (LYRICA) 100 MG capsule, Take 1 capsule by mouth daily for 30 days. , Disp: 30 capsule, Rfl: 2    Misc Natural Products (GLUCOSAMINE CHOND MSM FORMULA) TABS, Take by mouth, Disp: , Rfl:     ALFALFA PO, Take by mouth, Disp: , Rfl:     vitamin D (ERGOCALCIFEROL) 1.25 MG (01283 UT) CAPS capsule, Take 50,000 Units by mouth once a week , Disp: , Rfl:     acetaminophen (TYLENOL) 325 MG tablet, Take 650 mg by mouth every 6 hours as needed for Pain (Patient not taking: Reported on 2022), Disp: , Rfl:     Omega-3 Fatty Acids (OMEGA 3 PO), Take by mouth daily , Disp: , Rfl:     Family History   Problem Relation Age of Onset    Stroke Father        Social History     Socioeconomic History    Marital status: Single     Spouse name: Not on file    Number of children: Not on file    Years of education: Not on file    Highest education level: Not on file   Occupational History    Not on file   Tobacco Use    Smoking status: Former Smoker     Types: Cigarettes     Start date: 1990     Quit date: 1999     Years since quittin.2    Smokeless tobacco: Never Used   Substance and Sexual Activity    Alcohol use: No    Drug use: No    Sexual activity: Never   Other Topics Concern    Not on file   Social History Narrative    Not on file     Social Determinants of Health     Financial Resource Strain:     Difficulty of Paying Living Expenses: Not on file   Food Insecurity:     Worried About Running Out of Food in the Last Year: Not on file    Jovanny of Food in the Last Year: Not on file   Transportation Needs:     Lack of Transportation (Medical): Not on file    Lack of Transportation (Non-Medical):  Not on file Physical Activity:     Days of Exercise per Week: Not on file    Minutes of Exercise per Session: Not on file   Stress:     Feeling of Stress : Not on file   Social Connections:     Frequency of Communication with Friends and Family: Not on file    Frequency of Social Gatherings with Friends and Family: Not on file    Attends Catholic Services: Not on file    Active Member of Clubs or Organizations: Not on file    Attends Club or Organization Meetings: Not on file    Marital Status: Not on file   Intimate Partner Violence:     Fear of Current or Ex-Partner: Not on file    Emotionally Abused: Not on file    Physically Abused: Not on file    Sexually Abused: Not on file   Housing Stability:     Unable to Pay for Housing in the Last Year: Not on file    Number of [place] in the Last Year: Not on file    Unstable Housing in the Last Year: Not on file       Review of Systems:  Review of Systems   Constitutional: Negative for chills and fever. Cardiovascular: Negative for chest pain and palpitations. Respiratory: Negative for cough and shortness of breath. Musculoskeletal: Positive for back pain. Gastrointestinal: Negative for constipation. Neurological: Positive for numbness and tingling. Negative for disturbances in coordination and loss of balance. Physical Exam:  There were no vitals taken for this visit. Physical Exam  HENT:      Head: Normocephalic. Pulmonary:      Effort: Pulmonary effort is normal.   Neurological:      Mental Status: She is alert. Psychiatric:         Mood and Affect: Mood normal.         Behavior: Behavior normal.         Record/Diagnostics Review: . Diffuse congenital spinal canal stenosis.  Superimposed degenerative   changes further narrow the spinal canal, including severe multifactorial   spinal canal stenosis at L1-L2, L2-L3, and L3-L4.  Mass effect on the cauda   equina nerve roots at these levels.    2. Severe left L3-L4 and left L5-S1

## 2022-03-28 ENCOUNTER — HOSPITAL ENCOUNTER (OUTPATIENT)
Age: 67
Setting detail: SPECIMEN
Discharge: HOME | End: 2022-03-28

## 2022-03-28 LAB
CREATININE URINE: 103 MG/DL (ref 28–217)
MICROALBUMIN/CREAT 24H UR: <12 MG/L
MICROALBUMIN/CREAT UR-RTO: NORMAL MCG/MG CREAT

## 2022-03-30 ENCOUNTER — HOSPITAL ENCOUNTER (OUTPATIENT)
Age: 67
Setting detail: SPECIMEN
Discharge: HOME | End: 2022-03-30

## 2022-03-31 ENCOUNTER — TELEPHONE (OUTPATIENT)
Dept: PAIN MANAGEMENT | Age: 67
End: 2022-03-31

## 2022-03-31 DIAGNOSIS — M48.061 SPINAL STENOSIS OF LUMBAR REGION, UNSPECIFIED WHETHER NEUROGENIC CLAUDICATION PRESENT: ICD-10-CM

## 2022-03-31 DIAGNOSIS — M54.16 LUMBAR RADICULOPATHY: ICD-10-CM

## 2022-03-31 LAB
CALCIUM URINE: 23.6 MG/DL
CALCIUM, URINE: 424 MG/24 H (ref 20–275)
CREATININE URINE: 98.1 MG/DL
CREATININE, 24H UR: 1761 MG/24 H (ref 740–1570)
HOURS COLLECTED: 24 H
HOURS COLLECTED: 24 H
VOLUME: 1795 ML
VOLUME: 1795 ML

## 2022-03-31 RX ORDER — PREGABALIN 100 MG/1
100 CAPSULE ORAL 2 TIMES DAILY
Qty: 60 CAPSULE | Refills: 0 | Status: SHIPPED | OUTPATIENT
Start: 2022-03-31 | End: 2022-05-12 | Stop reason: DRUGHIGH

## 2022-03-31 NOTE — TELEPHONE ENCOUNTER
Patient called in stating [phone] never received her Lyrica prescription and she's in pain. Advised prescription was written 03/17/22 to start on 03/28/22. [facility] and spoke to Jonathan who states they never received the Lyrica to be started on 03/28/22, only the one on 03/14/22. Patient states ROHIT Ward wanted her to increase to 2 pills and needed a second prescription to do that. Please advise.

## 2022-04-05 ENCOUNTER — HOSPITAL ENCOUNTER (OUTPATIENT)
Age: 67
Setting detail: SPECIMEN
Discharge: HOME | End: 2022-04-05

## 2022-04-05 LAB
ALBUMIN SERPL-MCNC: 4.3 G/DL (ref 3.5–5.2)
ALBUMIN/GLOBULIN RATIO: 1.3 (ref 1–2.5)
ALP BLD-CCNC: 91 U/L (ref 35–104)
ALT SERPL-CCNC: 15 U/L (ref 5–33)
ANION GAP SERPL CALCULATED.3IONS-SCNC: 12 MMOL/L (ref 9–17)
AST SERPL-CCNC: 18 U/L
BILIRUB SERPL-MCNC: 0.32 MG/DL (ref 0.3–1.2)
BUN BLDV-MCNC: 9 MG/DL (ref 8–23)
C-PEPTIDE: 4 NG/ML (ref 1.1–4.4)
CALCIUM SERPL-MCNC: 11.1 MG/DL (ref 8.6–10.4)
CHLORIDE BLD-SCNC: 106 MMOL/L (ref 98–107)
CHOLESTEROL, FASTING: 122 MG/DL
CHOLESTEROL/HDL RATIO: 2.1
CO2: 25 MMOL/L (ref 20–31)
CREAT SERPL-MCNC: 0.9 MG/DL (ref 0.5–0.9)
GFR AFRICAN AMERICAN: >60 ML/MIN
GFR NON-AFRICAN AMERICAN: >60 ML/MIN
GFR SERPL CREATININE-BSD FRML MDRD: ABNORMAL ML/MIN/{1.73_M2}
GLUCOSE FASTING: 99 MG/DL (ref 70–99)
HDLC SERPL-MCNC: 57 MG/DL
INSULIN COMMENT: NORMAL
INSULIN REFERENCE RANGE:: NORMAL
INSULIN: 26.4 MU/L
LDL CHOLESTEROL: 48 MG/DL (ref 0–130)
POTASSIUM SERPL-SCNC: 4.4 MMOL/L (ref 3.7–5.3)
PTH INTACT: 116.6 PG/ML (ref 15–65)
SODIUM BLD-SCNC: 143 MMOL/L (ref 135–144)
TOTAL PROTEIN: 7.7 G/DL (ref 6.4–8.3)
TRIGLYCERIDE, FASTING: 85 MG/DL
VITAMIN D 25-HYDROXY: 34.2 NG/ML

## 2022-04-12 ENCOUNTER — TELEPHONE (OUTPATIENT)
Dept: PAIN MANAGEMENT | Age: 67
End: 2022-04-12

## 2022-04-12 NOTE — TELEPHONE ENCOUNTER
----- Message from Lori Willis sent at 4/8/2022  3:02 PM EDT -----  Regarding: Patient called for Medication Refills  The Pt called requesting her Medication refill of Pegadlin be called into a New Pharmacy location. She would like her medications refills be switched from [facility] to the (New) location of the [facility] on [address]. The Pt is requesting a Call Back today (If at all possible) letting her know that it was called in, so she can go & pick it up. The Pt can be reached at [phone].

## 2022-04-14 NOTE — PROGRESS NOTES
Pt aware of new procedure and arrival time, NPO after midnight. Need for /stay, no visitors in building and location.  Denies any blood thinners or antibxs
bed rails

## 2022-04-20 ENCOUNTER — OFFICE VISIT (OUTPATIENT)
Dept: ORTHOPEDIC SURGERY | Age: 67
End: 2022-04-20
Payer: MEDICARE

## 2022-04-20 VITALS — WEIGHT: 256 LBS | HEIGHT: 65 IN | BODY MASS INDEX: 42.65 KG/M2

## 2022-04-20 DIAGNOSIS — M25.562 CHRONIC PAIN OF LEFT KNEE: Primary | ICD-10-CM

## 2022-04-20 DIAGNOSIS — M47.817 LUMBOSACRAL SPONDYLOSIS WITHOUT MYELOPATHY: ICD-10-CM

## 2022-04-20 DIAGNOSIS — G89.29 CHRONIC PAIN OF LEFT KNEE: Primary | ICD-10-CM

## 2022-04-20 DIAGNOSIS — M54.16 LUMBAR RADICULAR PAIN: ICD-10-CM

## 2022-04-20 DIAGNOSIS — M48.062 SPINAL STENOSIS OF LUMBAR REGION WITH NEUROGENIC CLAUDICATION: ICD-10-CM

## 2022-04-20 PROCEDURE — 99214 OFFICE O/P EST MOD 30 MIN: CPT | Performed by: PHYSICIAN ASSISTANT

## 2022-04-20 NOTE — PROGRESS NOTES
[address], [address] [facility] [address] Stephanie McDonough, [address] [place] [address], [address] [facility]           Dept Phone: [phone]           Dept Fax:  [facility]           Valdemar Billy          Dept Phone: [phone]           Dept Fax:  [phone]      Chief Compliant:  Chief Complaint   Patient presents with    Hip Pain     Right        History of Present Illness: This is a 73 y.o. female who presents to the clinic today for evaluation of had concerns including Hip Pain (Right). Ms. Nathan Vargas is a 41-year-old female who presents for evaluation of chronic left knee pain. She is also here for reevaluation of her right hip after undergoing a trochanteric bursal injection in January 2022 however reports her right hip is doing excellent today. Patient reports that this left knee pain is chronic however the last several weeks she has had intermittent swelling and tightness. She denies any injury or trauma no knee joint warmth, redness, fever or chills. Patient reports of note that she does have a known \"pinched nerve\" and history of spinal stenosis which causes chronic left leg pain and numbness but she reports that this left knee pain feels different than her chronic radicular symptoms. Past History:    Current Outpatient Medications:     pregabalin (LYRICA) 100 MG capsule, Take 1 capsule by mouth 2 times daily for 30 days. , Disp: 60 capsule, Rfl: 0    Misc Natural Products (GLUCOSAMINE CHOND MSM FORMULA) TABS, Take by mouth, Disp: , Rfl:     ALFALFA PO, Take by mouth, Disp: , Rfl:     vitamin D (ERGOCALCIFEROL) 1.25 MG (80773 UT) CAPS capsule, Take 50,000 Units by mouth once a week , Disp: , Rfl:     acetaminophen (TYLENOL) 325 MG tablet, Take 650 mg by mouth every 6 hours as needed for Pain , Disp: , Rfl:     Omega-3 Fatty Acids (OMEGA 3 PO), Take by History:   Diagnosis Date    Lumbar radiculopathy 2/15/2021    Lumbosacral spondylosis without myelopathy 2/15/2021    Morbid obesity ([address] 4/17/2015     Past Surgical History:   Procedure Laterality Date    CHOLECYSTECTOMY  1/2000    NERVE BLOCK N/A 01/29/2021    : EPIDURAL STEROID INJECTION - CAUDAL (N/A )    PAIN MANAGEMENT PROCEDURE  12/04/2020    EPIDURAL STEROID INJECTION    PAIN MANAGEMENT PROCEDURE N/A 12/4/2020    EPIDURAL STEROID INJECTION performed by Breann Soto MD at [facility] N/A 1/29/2021    EPIDURAL STEROID INJECTION - CAUDAL performed by Breann Soto MD at [id number] [place] [address].     Family History   Problem Relation Age of Onset    Stroke Father         Review of Systems   Constitutional: Negative for fever, chills, sweats. Eyes: Negative for changes in vision, or pain. HENT: Negative for ear ache, epistaxis, or sore throat. Respiratory/Cardio: Negative for Chest pain, palpitations, SOB, or cough. Gastrointestinal: Negative for abdominal pain, N/V/D. Genitourinary: Negative for dysuria, frequency, urgency, or hematuria. Neurological: Negative for headache, numbness, or weakness. Integumentary: Negative for rash, itching, laceration, or abrasion. Musculoskeletal: Positive for Hip Pain (Right)       Physical Exam:  Constitutional: Patient is oriented to person, place, and time. Patient appears well-developed and well nourished. HENT: Negative otherwise noted  Head: Normocephalic and Atraumatic  Nose: Normal  Eyes: Conjunctivae and EOM are normal  Neck: Normal range of motion Neck supple. Respiratory/Cardio: Effort normal. No respiratory distress. Musculoskeletal:    left Knee:     Skin: warm and dry, no rash or erythema  Vasculature: 2+ pedal pulses bilaterally  Neuro: Sensation grossly intact to light touch diffusely  Alignment: Genu valgum  Tenderness: medial joint line.  No tenderness to quad/patellar tendon, pes anserine reviewed by me 4/20/22:  AP bilateral knees standing lateral view of the left knee demonstrates tricompartmental DJD of the left knee Most severe in the lateral and patellofemoral compartments. Tricompartmental spurring present. Right knee with similar severe lateral compartmental narrowing on AP view. No evidence of acute fracture. Orders Placed This Encounter   Procedures    XR KNEE LEFT (1-2 VIEWS)     Standing Status:   Future     Number of Occurrences:   1     Standing Expiration Date:   4/20/2023    External Referral To Physical Therapy     Referral Priority:   Routine     Referral Type:   Eval and Treat     Referral Reason:   Specialty Services Required     Referred to Provider:   Behzad José     Requested Specialty:   Physical Therapy     Number of Visits Requested:   1       Assessment and Plan:  1. Chronic pain of left knee    2. Lumbosacral spondylosis without myelopathy    3. Lumbar radicular pain    4. Spinal stenosis of lumbar region with neurogenic claudication          PLAN:  Lesley Castaneda is a 73 y.o. old female who presents for evaluation of acute exacerbation of chronic left knee pain due to left knee osteoarthritis. Patient also here for evaluation of right hip pain consistent with trochanteric bursitis which has been significant proved since undergoing an injection in January 2022  1. I had a discussion with the patient with regards to the nature and extent of her problem. We also discussed treatment options available to her including non-operative and operative intervention. 2. To this end we discussed use of NSAIDs, cortisone and viscosupplementation injections, weight loss, activity modification, physical therapy, bracing, and use of assistive walking devices. We also had discussions about total knee arthroplasties. 3.  Patient with known osteoarthritis of her right knee for which she has seen Dr. Leroy Quiñonez 4 years ago.   She reports she underwent a Kenalog injection which provided significant relief of pain. She would like to hold off on injection on the left knee at this time but if she does pursue an injection in the future is requesting Kenalog. 4. In light of this patient elected to proceed with referral to physical therapy prior to proceeding with an injection. This is provided at her request  5. Follow-up on as needed basis should patient's pain return would be happy to do an injection for her at that time. .          Electronically signed by SHILO Lara on 4/20/22 at 3:33 PM EDT        Please note that this chart was generated using voice recognition Dragon dictation software. Although every effort was made to ensure the accuracy of this automated transcription, some errors in transcription may have occurred.

## 2022-05-12 ENCOUNTER — OFFICE VISIT (OUTPATIENT)
Dept: PAIN MANAGEMENT | Age: 67
End: 2022-05-12
Payer: MEDICARE

## 2022-05-12 VITALS — WEIGHT: 255 LBS | HEIGHT: 65 IN | BODY MASS INDEX: 42.49 KG/M2

## 2022-05-12 DIAGNOSIS — M47.817 LUMBOSACRAL SPONDYLOSIS WITHOUT MYELOPATHY: ICD-10-CM

## 2022-05-12 DIAGNOSIS — M54.16 LUMBAR RADICULOPATHY: Primary | ICD-10-CM

## 2022-05-12 PROCEDURE — 99213 OFFICE O/P EST LOW 20 MIN: CPT | Performed by: NURSE PRACTITIONER

## 2022-05-12 RX ORDER — PREGABALIN 150 MG/1
150 CAPSULE ORAL 2 TIMES DAILY
Qty: 60 CAPSULE | Refills: 0 | Status: SHIPPED | OUTPATIENT
Start: 2022-05-12 | End: 2022-06-09 | Stop reason: SDUPTHER

## 2022-05-12 ASSESSMENT — ENCOUNTER SYMPTOMS
ABDOMINAL PAIN: 0
COUGH: 0
CONSTIPATION: 0
BOWEL INCONTINENCE: 0
BACK PAIN: 1
SHORTNESS OF BREATH: 0

## 2022-05-12 NOTE — PROGRESS NOTES
Chief Complaint   Patient presents with    Back Pain    Medication Refill     lyrica         [facility]   Patient complains of low back pain that radiates down both legs. MRI with severe stenosis. She did see NS but does not want surgery at this time. She had Caudal epidural 1/29/21 with 50% relief but still has some lingering pain in the left leg. She completed PT for her back with some benefit. She is also following with orthopedics for her left hip. She did have a hip injection 1/2022 with some relief. She has stopped meloxicam because it made her feel stiff. She has tried muscle relaxants but does not like the way they make her feel.  She is currently taking Lyrica 100 mg QD with some benefit. Feels 100 mg dose has been managing her pain well most days but there are some days she feels this does not help. She denies side effects with Lyrica. Back Pain  This is a chronic problem. The current episode started more than 1 year ago. The problem occurs intermittently. The problem has been gradually worsening since onset. The pain is present in the lumbar spine. Quality: tight, pressure  The pain radiates to the left foot and right knee. The pain is at a severity of 4/10. The pain is the same all the time. The symptoms are aggravated by standing, bending and twisting (stairs, lifting). Associated symptoms include leg pain, numbness, tingling and weakness. Pertinent negatives include no abdominal pain, bladder incontinence, bowel incontinence, chest pain, dysuria, fever, headaches or pelvic pain. She has tried heat, ice, muscle relaxant, NSAIDs and analgesics (physical therapist) for the symptoms. The treatment provided moderate relief. Patient denies any new neurological symptoms. No bowel or bladder incontinence, no weakness, and no falling.       Past Medical History:   Diagnosis Date    Lumbar radiculopathy 2/15/2021    Lumbosacral spondylosis without myelopathy 2/15/2021    Morbid obesity ([address] 2015       Past Surgical History:   Procedure Laterality Date    CHOLECYSTECTOMY  2000    NERVE BLOCK N/A 2021    : EPIDURAL STEROID INJECTION - CAUDAL (N/A )    PAIN MANAGEMENT PROCEDURE  2020    EPIDURAL STEROID INJECTION    PAIN MANAGEMENT PROCEDURE N/A 2020    EPIDURAL STEROID INJECTION performed by Meryle Avena, MD at [facility] N/A 2021    EPIDURAL STEROID INJECTION - CAUDAL performed by Meryle Avena, MD at [address] [place] [address].       No Known Allergies      Current Outpatient Medications:     ALFALFA PO, Take by mouth, Disp: , Rfl:     vitamin D (ERGOCALCIFEROL) 1.25 MG (60195 UT) CAPS capsule, Take 50,000 Units by mouth once a week , Disp: , Rfl:     acetaminophen (TYLENOL) 325 MG tablet, Take 650 mg by mouth every 6 hours as needed for Pain , Disp: , Rfl:     Omega-3 Fatty Acids (OMEGA 3 PO), Take by mouth daily , Disp: , Rfl:     pregabalin (LYRICA) 100 MG capsule, Take 1 capsule by mouth 2 times daily for 30 days. , Disp: 60 capsule, Rfl: 0    Misc Natural Products ([employer] MSM FORMULA) TABS, Take by mouth, Disp: , Rfl:     Family History   Problem Relation Age of Onset    Stroke Father        Social History     Socioeconomic History    Marital status: Single     Spouse name: Not on file    Number of children: Not on file    Years of education: Not on file    Highest education level: Not on file   Occupational History    Not on file   Tobacco Use    Smoking status: Former Smoker     Types: Cigarettes     Start date: 1990     Quit date: 1999     Years since quittin.3    Smokeless tobacco: Never Used   Substance and Sexual Activity    Alcohol use: No    Drug use: No    Sexual activity: Never   Other Topics Concern    Not on file   Social History Narrative    Not on file     Social Determinants of Health     Financial Resource Strain:     Difficulty of Paying Living Expenses: Not on file Food Insecurity:     Worried About Running Out of Food in the Last Year: Not on file    Jovanny of Food in the Last Year: Not on file   Transportation Needs:     Lack of Transportation (Medical): Not on file    Lack of Transportation (Non-Medical): Not on file   Physical Activity:     Days of Exercise per Week: Not on file    Minutes of Exercise per Session: Not on file   Stress:     Feeling of Stress : Not on file   Social Connections:     Frequency of Communication with Friends and Family: Not on file    Frequency of Social Gatherings with Friends and Family: Not on file    Attends Scientologist Services: Not on file    Active Member of [address] [place] [address] [employer] or Organizations: Not on file    Attends Club or Organization Meetings: Not on file    Marital Status: Not on file   Intimate Partner Violence:     Fear of Current or Ex-Partner: Not on file    Emotionally Abused: Not on file    Physically Abused: Not on file    Sexually Abused: Not on file   Housing Stability:     Unable to Pay for Housing in the Last Year: Not on file    Number of [place] in the Last Year: Not on file    Unstable Housing in the Last Year: Not on file       Review of Systems:  Review of Systems   Constitutional: Negative for chills and fever. Cardiovascular: Negative for chest pain and palpitations. Respiratory: Negative for cough and shortness of breath. Musculoskeletal: Positive for back pain. Gastrointestinal: Negative for abdominal pain, bowel incontinence and constipation. Genitourinary: Negative for bladder incontinence, dysuria and pelvic pain. Neurological: Positive for numbness, tingling and weakness. Negative for disturbances in coordination, headaches and loss of balance. Physical Exam:  Ht 5' 4.5\" (1.638 m)   Wt 255 lb (115.7 kg)   BMI 43.09 kg/m²     Physical Exam  HENT:      Head: Normocephalic.    Pulmonary:      Effort: Pulmonary effort is normal.   Musculoskeletal:         General: Normal range of motion. Cervical back: Normal range of motion. Lumbar back: Tenderness present. Skin:     General: Skin is warm and dry. Neurological:      Mental Status: She is alert and oriented to person, place, and time. Record/Diagnostics Review:      MRI 2020  FINDINGS:   BONES/ALIGNMENT: Sagittal alignment of the lumbar spine is within normal   limits.  Lumbar vertebral bodies are normal in height.  No acute lumbar spine   fracture.  Degenerative fatty marrow changes are seen about L3-L4.  Remaining   marrow signal pattern is within normal limits.       Diffuse congenital spinal canal stenosis.       SPINAL CORD: The conus terminates normally. The cauda equina nerve roots are   thickened and redundant secondary to spinal canal stenosis (see below).       SOFT TISSUES: No paraspinal mass identified.  Partial visualization of right   renal cyst.       L1-L2: Mild DDD.  Diffuse annular bulging.  Dorsal epidural lipomatosis. Bilateral facet joint DJD.  Severe spinal canal stenosis with mass effect on   the cauda equina nerve roots.  Moderate right and mild left neural foraminal   stenosis.       L2-L3: Mild DDD.  Diffuse disc bulge eccentric to the right measures 3 mm. Bilateral facet joint DJD and ligamentum flavum thickening.  Severe spinal   canal stenosis with mass effect on the cauda equina nerve roots.  Mild   bilateral neural foraminal stenosis.       L3-L4: Moderate DDD.  Diffuse disc bulge measures 5 mm.  Bilateral facet   joint DJD and ligamentum flavum thickening.  Dorsal epidural lipomatosis. Severe spinal canal stenosis with mass effect on the cauda equina nerve   roots.  Severe left and moderate right neural foraminal stenosis.       L4-L5: Severe DDD.  No focal disc herniation or significant spinal canal   stenosis.  Bilateral facet joint DJD.  Bilateral lateral recess stenosis. Mild bilateral neural foraminal stenosis.       L5-S1: Moderate DDD.   Diffuse disc bulge measures 4 mm.  No significant   thecal sac stenosis.  Bilateral lateral recess stenosis.  Severe left and   moderate right neural foraminal stenosis.         Impression   1. Diffuse congenital spinal canal stenosis.  Superimposed degenerative   changes further narrow the spinal canal, including severe multifactorial   spinal canal stenosis at L1-L2, L2-L3, and L3-L4.  Mass effect on the cauda   equina nerve roots at these levels. 2. Severe left L3-L4 and left L5-S1 neural foraminal stenosis. 3. Moderate right L3-L4 and right L5-S1 neural foraminal stenosis. Additional mild multilevel bilateral neural foraminal stenosis, as described   above. Assessment:  Problem List Items Addressed This Visit     Lumbosacral spondylosis without myelopathy    Relevant Medications    pregabalin (LYRICA) 150 MG capsule    Other Relevant Orders    MRI LUMBAR SPINE WO CONTRAST    Lumbar radiculopathy - Primary    Relevant Medications    pregabalin (LYRICA) 150 MG capsule    Other Relevant Orders    MRI LUMBAR SPINE WO CONTRAST             Treatment Plan:  Patient states her back pain is not well managed with the Lyrica at current dose  Will try 150 mg BID   She will start PT next week  Has failed conservative measures, including physical therapy and the pain is worsening, I do believe an MRI of the lumbar spine is indicated to further evaluate pathology and guide treatment plan. Consider injection therapies versus surgical evaluation based on imaging results. Follow up appointment made for 4 weeks    I have reviewed the chief complaint and history of present illness (including ROS and PFSH) and vital documentation by my staff and I agree with their documentation and have added where applicable.

## 2022-06-09 ENCOUNTER — OFFICE VISIT (OUTPATIENT)
Dept: PAIN MANAGEMENT | Age: 67
End: 2022-06-09
Payer: MEDICARE

## 2022-06-09 VITALS — WEIGHT: 255 LBS | BODY MASS INDEX: 43.09 KG/M2

## 2022-06-09 DIAGNOSIS — G89.29 CHRONIC PAIN OF LEFT ANKLE: Primary | ICD-10-CM

## 2022-06-09 DIAGNOSIS — M47.817 LUMBOSACRAL SPONDYLOSIS WITHOUT MYELOPATHY: ICD-10-CM

## 2022-06-09 DIAGNOSIS — M54.16 LUMBAR RADICULOPATHY: ICD-10-CM

## 2022-06-09 DIAGNOSIS — M25.572 CHRONIC PAIN OF LEFT ANKLE: Primary | ICD-10-CM

## 2022-06-09 PROCEDURE — 1123F ACP DISCUSS/DSCN MKR DOCD: CPT | Performed by: NURSE PRACTITIONER

## 2022-06-09 PROCEDURE — 99213 OFFICE O/P EST LOW 20 MIN: CPT | Performed by: NURSE PRACTITIONER

## 2022-06-09 RX ORDER — PREGABALIN 100 MG/1
100 CAPSULE ORAL NIGHTLY
Qty: 30 CAPSULE | Refills: 0 | Status: SHIPPED | OUTPATIENT
Start: 2022-06-09 | End: 2022-07-07 | Stop reason: SDUPTHER

## 2022-06-09 RX ORDER — PREGABALIN 150 MG/1
150 CAPSULE ORAL DAILY
Qty: 30 CAPSULE | Refills: 0 | Status: SHIPPED | OUTPATIENT
Start: 2022-06-09 | End: 2022-07-07 | Stop reason: SDUPTHER

## 2022-06-09 ASSESSMENT — ENCOUNTER SYMPTOMS
ABDOMINAL PAIN: 0
BACK PAIN: 1
BOWEL INCONTINENCE: 0

## 2022-06-09 NOTE — PROGRESS NOTES
Chief Complaint   Patient presents with    Back Pain    Medication Refill         [facility]   Patient complains of low back pain that radiates down both legs. MRI with severe stenosis. She did see NS but does not want surgery at this time. She had Caudal epidural 1/29/21 with 50% relief but still has some lingering pain in the left leg. She completed PT for her back with some benefit. She is also following with orthopedics for her left hip. She did have a hip injection 1/2022 with some relief. She has stopped meloxicam because it made her feel stiff. She has tried muscle relaxants but does not like the way they make her feel.  She is currently taking Lyrica 150 mg BID. She states this is managing the pain but nighttime dose is making her too sleepy. MRI lumbar ordered last month but not completed - she has not received a call to schedule this. Pt ordered last month but not started - the out of pocket cost is not affordable. Back Pain  This is a chronic problem. The current episode started more than 1 year ago. The problem occurs constantly. The problem is unchanged. The pain is present in the lumbar spine and gluteal. The quality of the pain is described as burning and aching. The pain radiates to the left thigh, left knee and left foot. The pain is at a severity of 5/10. The pain is moderate. The pain is the same all the time. The symptoms are aggravated by bending, standing, sitting, stress and twisting. Associated symptoms include leg pain, numbness, tingling and weakness. Pertinent negatives include no abdominal pain, bladder incontinence, bowel incontinence, chest pain, dysuria, fever, headaches, paresis, paresthesias, pelvic pain, perianal numbness or weight loss. She has tried analgesics, bed rest, home exercises, heat, chiropractic manipulation, ice, muscle relaxant, NSAIDs and walking for the symptoms. The treatment provided moderate relief.    Ankle Pain   The incident occurred more than 1 week ago. There was no injury mechanism. The pain is present in the left ankle. The quality of the pain is described as aching. The pain is at a severity of 7/10. The pain is moderate. The pain has been fluctuating since onset. Associated symptoms include numbness and tingling. The symptoms are aggravated by movement and weight bearing. She has tried non-weight bearing and rest for the symptoms. The treatment provided mild relief. Patient denies any new neurological symptoms. No bowel or bladder incontinence, no weakness, and no falling. Past Medical History:   Diagnosis Date    Lumbar radiculopathy 2/15/2021    Lumbosacral spondylosis without myelopathy 2/15/2021    Morbid obesity ([facility] [address] 4/17/2015       Past Surgical History:   Procedure Laterality Date    CHOLECYSTECTOMY  1/2000    NERVE BLOCK N/A 01/29/2021    : EPIDURAL STEROID INJECTION - CAUDAL (N/A )    PAIN MANAGEMENT PROCEDURE  12/04/2020    EPIDURAL STEROID INJECTION    PAIN MANAGEMENT PROCEDURE N/A 12/4/2020    EPIDURAL STEROID INJECTION performed by Jass Buchanan MD at [address] [place] [address] N/A 1/29/2021    EPIDURAL STEROID INJECTION - CAUDAL performed by Jass Buchanan MD at [id number] [place] [address].       No Known Allergies      Current Outpatient Medications:     pregabalin (LYRICA) 150 MG capsule, Take 1 capsule by mouth 2 times daily for 30 days. , Disp: 60 capsule, Rfl: 0    Misc Natural Products (GLUCOSAMINE CHOND MSM FORMULA) TABS, Take by mouth, Disp: , Rfl:     ALFALFA PO, Take by mouth, Disp: , Rfl:     vitamin D (ERGOCALCIFEROL) 1.25 MG (47392 UT) CAPS capsule, Take 50,000 Units by mouth once a week , Disp: , Rfl:     acetaminophen (TYLENOL) 325 MG tablet, Take 650 mg by mouth every 6 hours as needed for Pain , Disp: , Rfl:     Omega-3 Fatty Acids (OMEGA 3 PO), Take by mouth daily , Disp: , Rfl:     Family History   Problem Relation Age of Onset    Stroke Father        Social History Socioeconomic History    Marital status: Single     Spouse name: Not on file    Number of children: Not on file    Years of education: Not on file    Highest education level: Not on file   Occupational History    Not on file   Tobacco Use    Smoking status: Former Smoker     Types: Cigarettes     Start date: 1990     Quit date: 1999     Years since quittin.4    Smokeless tobacco: Never Used   Substance and Sexual Activity    Alcohol use: No    Drug use: No    Sexual activity: Never   Other Topics Concern    Not on file   Social History Narrative    Not on file     Social Determinants of Health     Financial Resource Strain:     Difficulty of Paying Living Expenses: Not on file   Food Insecurity:     Worried About [address] [email or web address] in the Last Year: Not on file    [employer] Food in the Last Year: Not on file   Transportation Needs:     Lack of Transportation (Medical): Not on file    Lack of Transportation (Non-Medical):  Not on file   Physical Activity:     Days of Exercise per Week: Not on file    Minutes of Exercise per Session: Not on file   Stress:     Feeling of Stress : Not on file   Social Connections:     Frequency of Communication with Friends and Family: Not on file    Frequency of Social Gatherings with Friends and Family: Not on file    Attends Voodoo Services: Not on file    Active Member of [address] [place] [address] [email or web address] or Organizations: Not on file    Attends Club or Organization Meetings: Not on file    Marital Status: Not on file   Intimate Partner Violence:     Fear of Current or Ex-Partner: Not on file    Emotionally Abused: Not on file    Physically Abused: Not on file    Sexually Abused: Not on file   Housing Stability:     Unable to Pay for Housing in the Last Year: Not on file    Number of [place] in the Last Year: Not on file    Unstable Housing in the Last Year: Not on file       Review of Systems:  Review of Systems   Constitutional: Negative for fever and weight loss. Cardiovascular: Negative for chest pain and palpitations. Musculoskeletal: Positive for back pain. Gastrointestinal: Negative for abdominal pain and bowel incontinence. Genitourinary: Negative for bladder incontinence, dysuria and pelvic pain. Neurological: Positive for numbness, tingling and weakness. Negative for disturbances in coordination, headaches, loss of balance and paresthesias. Physical Exam:  Wt 255 lb (115.7 kg)   BMI 43.09 kg/m²     Physical Exam  HENT:      Head: Normocephalic. Pulmonary:      Effort: Pulmonary effort is normal.   Musculoskeletal:         General: Normal range of motion. Cervical back: Normal range of motion. Lumbar back: Tenderness present. Left ankle: Tenderness present. Skin:     General: Skin is warm and dry. Neurological:      Mental Status: She is alert and oriented to person, place, and time. Record/Diagnostics Review:    x-rays AP lateral lumbar spine patient with ankylosis L4 to sacrum severe    multilevel lumbar degenerative disc disease         Assessment:  Problem List Items Addressed This Visit     Lumbosacral spondylosis without myelopathy    Relevant Medications    pregabalin (LYRICA) 150 MG capsule    pregabalin (LYRICA) 100 MG capsule    Lumbar radiculopathy    Relevant Medications    pregabalin (LYRICA) 150 MG capsule    pregabalin (LYRICA) 100 MG capsule      Other Visit Diagnoses     Chronic pain of left ankle    -  Primary    Relevant Medications    pregabalin (LYRICA) 150 MG capsule    pregabalin (LYRICA) 100 MG capsule    Other Relevant Orders    XR ANKLE LEFT (2 VIEWS)             Treatment Plan:  Reduce Lyrica dose to 150 mg once a day and 100 mg HS  She states she cannot afford the out-of-pocket cost for PT - her insurance will change in October and she will consider it at that time  MRI lumbar pending  Consider injection therapies versus surgical evaluation based on imaging results.   Follow up in four weeks    I have reviewed the chief complaint and history of present illness (including ROS and PFSH) and vital documentation by my staff and I agree with their documentation and have added where applicable.

## 2022-06-14 RX ORDER — ERGOCALCIFEROL 1.25 MG/1
CAPSULE ORAL
Qty: 4 CAPSULE | Refills: 1 | OUTPATIENT
Start: 2022-06-14

## 2022-07-05 ENCOUNTER — HOSPITAL ENCOUNTER (OUTPATIENT)
Dept: MRI IMAGING | Age: 67
Discharge: HOME | End: 2022-07-07
Payer: MEDICARE

## 2022-07-05 DIAGNOSIS — M54.16 LUMBAR RADICULOPATHY: ICD-10-CM

## 2022-07-05 DIAGNOSIS — M47.817 LUMBOSACRAL SPONDYLOSIS WITHOUT MYELOPATHY: ICD-10-CM

## 2022-07-05 PROCEDURE — 72148 MRI LUMBAR SPINE W/O DYE: CPT

## 2022-07-07 ENCOUNTER — TELEMEDICINE (OUTPATIENT)
Dept: PAIN MANAGEMENT | Age: 67
End: 2022-07-07
Payer: MEDICARE

## 2022-07-07 DIAGNOSIS — M47.817 LUMBOSACRAL SPONDYLOSIS WITHOUT MYELOPATHY: ICD-10-CM

## 2022-07-07 DIAGNOSIS — M54.16 LUMBAR RADICULOPATHY: ICD-10-CM

## 2022-07-07 PROCEDURE — 99422 OL DIG E/M SVC 11-20 MIN: CPT | Performed by: NURSE PRACTITIONER

## 2022-07-07 RX ORDER — PREGABALIN 150 MG/1
150 CAPSULE ORAL DAILY
Qty: 30 CAPSULE | Refills: 1 | Status: SHIPPED | OUTPATIENT
Start: 2022-07-07 | End: 2022-09-12

## 2022-07-07 RX ORDER — PREGABALIN 100 MG/1
100 CAPSULE ORAL NIGHTLY
Qty: 30 CAPSULE | Refills: 1 | Status: SHIPPED | OUTPATIENT
Start: 2022-07-07 | End: 2022-09-12 | Stop reason: ALTCHOICE

## 2022-07-07 ASSESSMENT — ENCOUNTER SYMPTOMS
COUGH: 0
SHORTNESS OF BREATH: 0
BACK PAIN: 1
BOWEL INCONTINENCE: 0
CONSTIPATION: 0

## 2022-07-07 NOTE — PROGRESS NOTES
CHOLECYSTECTOMY  2000    NERVE BLOCK N/A 2021    : EPIDURAL STEROID INJECTION - CAUDAL (N/A )    PAIN MANAGEMENT PROCEDURE  2020    EPIDURAL STEROID INJECTION    PAIN MANAGEMENT PROCEDURE N/A 2020    EPIDURAL STEROID INJECTION performed by Julisa Coello MD at [address] [place] [address] N/A 2021    EPIDURAL STEROID INJECTION - CAUDAL performed by Julisa Coello MD at [address] [place] [address].       No Known Allergies      Current Outpatient Medications:     pregabalin (LYRICA) 150 MG capsule, Take 1 capsule by mouth daily for 30 days. , Disp: 30 capsule, Rfl: 0    pregabalin (LYRICA) 100 MG capsule, Take 1 capsule by mouth nightly for 30 days. , Disp: 30 capsule, Rfl: 0    Misc Natural Products (GLUCOSAMINE CHOND MSM FORMULA) TABS, Take by mouth, Disp: , Rfl:     ALFALFA PO, Take by mouth, Disp: , Rfl:     vitamin D (ERGOCALCIFEROL) 1.25 MG (89783 UT) CAPS capsule, Take 50,000 Units by mouth once a week , Disp: , Rfl:     acetaminophen (TYLENOL) 325 MG tablet, Take 650 mg by mouth every 6 hours as needed for Pain , Disp: , Rfl:     Omega-3 Fatty Acids (OMEGA 3 PO), Take by mouth daily , Disp: , Rfl:     Family History   Problem Relation Age of Onset    Stroke Father        Social History     Socioeconomic History    Marital status: Single     Spouse name: Not on file    Number of children: Not on file    Years of education: Not on file    Highest education level: Not on file   Occupational History    Not on file   Tobacco Use    Smoking status: Former Smoker     Types: Cigarettes     Start date: 1990     Quit date: 1999     Years since quittin.5    Smokeless tobacco: Never Used   Substance and Sexual Activity    Alcohol use: No    Drug use: No    Sexual activity: Never   Other Topics Concern    Not on file   Social History Narrative    Not on file     Social Determinants of Health     Financial Resource Strain:     Difficulty of Paying Living Expenses: Not on file   Food Insecurity:     Worried About [address] [employer] in the Last Year: Not on file    [employer] Food in the Last Year: Not on file   Transportation Needs:     Lack of Transportation (Medical): Not on file    Lack of Transportation (Non-Medical): Not on file   Physical Activity:     Days of Exercise per Week: Not on file    Minutes of Exercise per Session: Not on file   Stress:     Feeling of Stress : Not on file   Social Connections:     Frequency of Communication with Friends and Family: Not on file    Frequency of Social Gatherings with Friends and Family: Not on file    Attends Orthodox Services: Not on file    Active Member of [address] [place] [address] or Organizations: Not on file    Attends Club or Organization Meetings: Not on file    Marital Status: Not on file   Intimate Partner Violence:     Fear of Current or Ex-Partner: Not on file    Emotionally Abused: Not on file    Physically Abused: Not on file    Sexually Abused: Not on file   Housing Stability:     Unable to Pay for Housing in the Last Year: Not on file    Number of [place] in the Last Year: Not on file    Unstable Housing in the Last Year: Not on file       Review of Systems:  Review of Systems   Constitutional: Negative for chills and fever. Cardiovascular: Negative for chest pain and palpitations. Respiratory: Negative for cough and shortness of breath. Musculoskeletal: Positive for back pain. Gastrointestinal: Negative for bowel incontinence and constipation. Genitourinary: Negative for bladder incontinence. Neurological: Positive for numbness, tingling and weakness. Negative for disturbances in coordination and loss of balance. Physical Exam:  There were no vitals taken for this visit. Physical Exam  Neurological:      Mental Status: She is alert.    Psychiatric:         Mood and Affect: Mood normal.         Behavior: Behavior normal.         Record/Diagnostics Review:    FINDINGS: BONES/ALIGNMENT: Lumbar spine alignment and vertebral body heights are   normal.  No concerning marrow signal abnormality is present.       SPINAL CORD: The conus terminates normally.       SOFT TISSUES: No paraspinal mass identified.       L1-L2: Moderate disc space narrowing, posterior disc bulge and facet   hypertrophy.  Moderate central canal stenosis and right foraminal stenosis.       L2-L3: Moderate disc space narrowing with mild posterior disc bulge, moderate   facet hypertrophy and moderate ligamentous thickening.  Severe central canal   stenosis and moderate narrowing of the lateral recesses worse on the right. Moderate right foraminal stenosis.       L3-L4: Marked disc space narrowing with moderate posterior disc bulge and   facet hypertrophy.  Mild ligamentous thickening.  Severe central canal   stenosis and narrowing of the lateral recesses worse on the left.  Bilateral   foraminal stenosis mild on the right and moderate on the left.       L4-L5: Marked disc space narrowing with no significant posterior disc bulge. Mild facet hypertrophy.  No significant stenosis.       L5-S1: Marked disc space narrowing with mild posterior disc bulge and facet   hypertrophy.  Bilateral foraminal stenosis mild on the right and moderate on   the left.           Impression   Diffuse lumbar spine degenerative changes with central canal, lateral recess   and foraminal stenosis as detailed above most pronounced at L2-3 and L3-4.       Degenerative changes and stenoses have mildly progressed since the comparison   study 09/10/2020.          Assessment:  Problem List Items Addressed This Visit     Lumbosacral spondylosis without myelopathy    Relevant Medications    pregabalin (LYRICA) 150 MG capsule    pregabalin (LYRICA) 100 MG capsule    Lumbar radiculopathy    Relevant Medications    pregabalin (LYRICA) 150 MG capsule    pregabalin (LYRICA) 100 MG capsule             Treatment Plan:    MRI lumbar reviewed with patient  Discussed different treatment options including continued conservative care such as physical therapy, chiropractic care, acupuncture. She is currently in PT for her back. Discussed different interventional options such as epidural steroids or medial branch blocks. She does not want injections at this time - does not feel they have helped much in the past.   Also discussed neuromodulation in the form of spinal cord stimulation. Also discussed surgical evaluation. She did see a surgeon and is a surgical candidate however she does not want surgery. I have reviewed the chief complaint and history of present illness (including ROS and PFSH) and vital documentation by my staff and I agree with their documentation and have added where applicable. Johanna Solano, was evaluated through a synchronous (real-time) audio-video encounter. The patient (or guardian if applicable) is aware that this is a billable service, which includes applicable co-pays. This Virtual Visit was conducted with patient's (and/or legal guardian's) consent. The visit was conducted pursuant to the emergency declaration under the [address] [place] [address] authority and the [employer] General Act. Patient identification was verified, and a caregiver was present when appropriate. The patient was located at Home: [place] [id number]. Provider was located at Home ([address]: [place]. Total time spent for this encounter: Not billed by time    --KEY Bolivar CNP on 7/7/2022 at 5:20 PM    An electronic signature was used to authenticate this note.

## 2022-07-22 ENCOUNTER — HOSPITAL ENCOUNTER (OUTPATIENT)
Dept: GENERAL RADIOLOGY | Age: 67
Discharge: HOME | End: 2022-07-24
Payer: MEDICARE

## 2022-07-22 ENCOUNTER — HOSPITAL ENCOUNTER (OUTPATIENT)
Age: 67
Discharge: HOME | End: 2022-07-24
Payer: MEDICARE

## 2022-07-22 DIAGNOSIS — M25.572 CHRONIC PAIN OF LEFT ANKLE: ICD-10-CM

## 2022-07-22 DIAGNOSIS — G89.29 CHRONIC PAIN OF LEFT ANKLE: ICD-10-CM

## 2022-07-22 PROCEDURE — 73600 X-RAY EXAM OF ANKLE: CPT

## 2022-09-12 DIAGNOSIS — M54.16 LUMBAR RADICULOPATHY: ICD-10-CM

## 2022-09-12 DIAGNOSIS — M47.817 LUMBOSACRAL SPONDYLOSIS WITHOUT MYELOPATHY: ICD-10-CM

## 2022-09-12 RX ORDER — PREGABALIN 150 MG/1
CAPSULE ORAL
Qty: 30 CAPSULE | Refills: 1 | Status: SHIPPED | OUTPATIENT
Start: 2022-09-12 | End: 2022-10-12

## 2022-11-07 ENCOUNTER — OFFICE VISIT (OUTPATIENT)
Dept: PAIN MANAGEMENT | Age: 67
End: 2022-11-07
Payer: MEDICARE

## 2022-11-07 VITALS — BODY MASS INDEX: 42.49 KG/M2 | HEIGHT: 65 IN | WEIGHT: 255 LBS

## 2022-11-07 DIAGNOSIS — M48.061 SPINAL STENOSIS OF LUMBAR REGION, UNSPECIFIED WHETHER NEUROGENIC CLAUDICATION PRESENT: ICD-10-CM

## 2022-11-07 DIAGNOSIS — M47.817 LUMBOSACRAL SPONDYLOSIS WITHOUT MYELOPATHY: Primary | ICD-10-CM

## 2022-11-07 PROCEDURE — 1123F ACP DISCUSS/DSCN MKR DOCD: CPT | Performed by: PAIN MEDICINE

## 2022-11-07 PROCEDURE — 99213 OFFICE O/P EST LOW 20 MIN: CPT | Performed by: PAIN MEDICINE

## 2022-11-07 ASSESSMENT — ENCOUNTER SYMPTOMS
BOWEL INCONTINENCE: 0
BACK PAIN: 1

## 2022-11-07 NOTE — PROGRESS NOTES
HPI:     Back Pain  This is a chronic problem. The current episode started more than 1 year ago. The problem occurs constantly. The problem has been gradually improving since onset. The pain is present in the lumbar spine. The quality of the pain is described as aching. The pain radiates to the left knee, left thigh and left foot. The pain is at a severity of 5/10. The pain is mild. The pain is Worse during the day. The symptoms are aggravated by bending, position, standing and twisting. Stiffness is present All day. Pertinent negatives include no bladder incontinence or bowel incontinence. She has tried bed rest, home exercises, heat, ice, walking and chiropractic manipulation for the symptoms. Has weaned herself off of Lyrica since October 20th. MRI lumbar spine with severe stenosis. She has been seeing a chiropractor as well. Patient denies any new neurological symptoms. Nobowel or bladder incontinence, no weakness, and no falling. Review of OARRS does not show any aberrant prescription behavior.      Past Medical History:   Diagnosis Date    Lumbar radiculopathy 2/15/2021    Lumbosacral spondylosis without myelopathy 2/15/2021    Morbid obesity ([facility] [address] 4/17/2015       Past Surgical History:   Procedure Laterality Date    CHOLECYSTECTOMY  1/2000    NERVE BLOCK N/A 01/29/2021    : EPIDURAL STEROID INJECTION - CAUDAL (N/A )    PAIN MANAGEMENT PROCEDURE  12/04/2020    EPIDURAL STEROID INJECTION    PAIN MANAGEMENT PROCEDURE N/A 12/4/2020    EPIDURAL STEROID INJECTION performed by Jennifer Boone MD at [facility] N/A 1/29/2021    EPIDURAL STEROID INJECTION - CAUDAL performed by Jennifer Boone MD at [id number] [place] [address].       No Known Allergies      Current Outpatient Medications:     [facility] Products ([address]  [facility] MSM FORMULA) TABS, Take by mouth, Disp: , Rfl:     ALFALFA PO, Take by mouth, Disp: , Rfl:     vitamin D (ERGOCALCIFEROL) 1.25 MG (87284 UT) CAPS capsule, Take 50,000 Units by mouth once a week , Disp: , Rfl:     acetaminophen (TYLENOL) 325 MG tablet, Take 650 mg by mouth every 6 hours as needed for Pain , Disp: , Rfl:     Omega-3 Fatty Acids (OMEGA 3 PO), Take by mouth daily , Disp: , Rfl:     Family History   Problem Relation Age of Onset    Stroke Father        Social History     Socioeconomic History    Marital status: Single     Spouse name: Not on file    Number of children: Not on file    Years of education: Not on file    Highest education level: Not on file   Occupational History    Not on file   Tobacco Use    Smoking status: Former     Types: Cigarettes     Start date: 1990     Quit date: 1999     Years since quittin.8    Smokeless tobacco: Never   Substance and Sexual Activity    Alcohol use: No    Drug use: No    Sexual activity: Never   Other Topics Concern    Not on file   Social History Narrative    Not on file     Social Determinants of Health     Financial Resource Strain: Not on file   Food Insecurity: Not on file   Transportation Needs: Not on file   Physical Activity: Not on file   Stress: Not on file   Social Connections: Not on file   Intimate Partner Violence: Not on file   Housing Stability: Not on file       Review of Systems:  Review of Systems   Musculoskeletal:  Positive for back pain. Gastrointestinal:  Negative for bowel incontinence. Genitourinary:  Negative for bladder incontinence. Physical Exam:  Ht 5' 4.5\" (1.638 m)   Wt 255 lb (115.7 kg)   BMI 43.09 kg/m²     Physical Exam  Constitutional:       Appearance: Normal appearance. Pulmonary:      Effort: Pulmonary effort is normal.   Neurological:      Mental Status: She is alert. Psychiatric:         Attention and Perception: Attention and perception normal.         Mood and Affect: Mood and affect normal.       Record/Diagnostics Review:    As above, I did review the imaging      Assessment:  1. Lumbosacral spondylosis without myelopathy    2. Spinal stenosis of lumbar region, unspecified whether neurogenic claudication present        Treatment Plan:  DISCUSSION: Treatment options discussed with patient and all questions answered to patient's satisfaction. OARRS Review: Reviewed and acceptable for medications prescribed. TREATMENT OPTIONS:     Discussed different treatment options including continued conservative care such as physical therapy, chiropractic care, acupuncture. Discussed different interventional options such as epidural steroids or medial branch blocks. Also discussed neuromodulation in the form of spinal cord stimulation. Also discussed surgical evaluation - declines    Wishes to think over her options. She is inquiring about spinal cord stimulator as she does have a friend who is considering this      Jaspreet Handy M.D. I have reviewed the chief complaint and history of present illness (including ROS and PFSH) and vital documentation by my staff and I agree with their documentation and have added where applicable.

## 2022-11-28 ENCOUNTER — HOSPITAL ENCOUNTER (OUTPATIENT)
Age: 67
Setting detail: SPECIMEN
Discharge: HOME | End: 2022-11-28

## 2022-11-28 LAB
ALBUMIN SERPL-MCNC: 4 G/DL (ref 3.5–5.2)
ALBUMIN/GLOBULIN RATIO: 1.3 (ref 1–2.5)
ALP BLD-CCNC: 87 U/L (ref 35–104)
ALT SERPL-CCNC: 16 U/L (ref 5–33)
ANION GAP SERPL CALCULATED.3IONS-SCNC: 12 MMOL/L (ref 9–17)
AST SERPL-CCNC: 18 U/L
BILIRUB SERPL-MCNC: 0.2 MG/DL (ref 0.3–1.2)
BUN BLDV-MCNC: 9 MG/DL (ref 8–23)
CALCIUM SERPL-MCNC: 11.1 MG/DL (ref 8.6–10.4)
CHLORIDE BLD-SCNC: 105 MMOL/L (ref 98–107)
CHOLESTEROL, FASTING: 123 MG/DL
CHOLESTEROL/HDL RATIO: 2.3
CO2: 26 MMOL/L (ref 20–31)
CREAT SERPL-MCNC: 0.94 MG/DL (ref 0.5–0.9)
CREATININE URINE: 114.5 MG/DL (ref 28–217)
GFR SERPL CREATININE-BSD FRML MDRD: >60 ML/MIN/1.73M2
GLUCOSE FASTING: 112 MG/DL (ref 70–99)
HDLC SERPL-MCNC: 53 MG/DL
LDL CHOLESTEROL: 44 MG/DL (ref 0–130)
MICROALBUMIN/CREAT 24H UR: <12 MG/L
MICROALBUMIN/CREAT UR-RTO: NORMAL MCG/MG CREAT
POTASSIUM SERPL-SCNC: 4.5 MMOL/L (ref 3.7–5.3)
PTH INTACT: 104.6 PG/ML (ref 14–72)
SODIUM BLD-SCNC: 143 MMOL/L (ref 135–144)
T3 FREE: 3.5 PG/ML (ref 2.02–4.43)
THYROXINE, FREE: 1.21 NG/DL (ref 0.93–1.7)
TOTAL PROTEIN: 7.1 G/DL (ref 6.4–8.3)
TRIGLYCERIDE, FASTING: 132 MG/DL
TSH SERPL DL<=0.05 MIU/L-ACNC: 1.79 UIU/ML (ref 0.3–5)
VITAMIN D 25-HYDROXY: 36.2 NG/ML

## 2023-06-05 ENCOUNTER — HOSPITAL ENCOUNTER (OUTPATIENT)
Age: 68
Setting detail: SPECIMEN
Discharge: HOME | End: 2023-06-05

## 2023-06-05 LAB
CHOLEST SERPL-MCNC: 115 MG/DL
CHOLESTEROL/HDL RATIO: 2.3
CREAT UR-MCNC: 87 MG/DL (ref 28–217)
HDLC SERPL-MCNC: 49 MG/DL
LDLC SERPL CALC-MCNC: 43 MG/DL (ref 0–130)
MICROALBUMIN UR-MCNC: <12 MG/L
MICROALBUMIN/CREAT UR-RTO: NORMAL MCG/MG CREAT
T4 FREE SERPL-MCNC: 1.2 NG/DL (ref 0.9–1.7)
TRIGL SERPL-MCNC: 117 MG/DL
TSH SERPL-MCNC: 1.77 UIU/ML (ref 0.3–5)

## 2023-09-05 ENCOUNTER — HOSPITAL ENCOUNTER (EMERGENCY)
Age: 68
Discharge: HOME | End: 2023-09-05
Attending: EMERGENCY MEDICINE
Payer: MEDICARE

## 2023-09-05 ENCOUNTER — APPOINTMENT (OUTPATIENT)
Dept: GENERAL RADIOLOGY | Age: 68
End: 2023-09-05
Payer: MEDICARE

## 2023-09-05 VITALS
SYSTOLIC BLOOD PRESSURE: 126 MMHG | HEART RATE: 84 BPM | DIASTOLIC BLOOD PRESSURE: 71 MMHG | TEMPERATURE: 98.4 F | OXYGEN SATURATION: 96 % | RESPIRATION RATE: 16 BRPM

## 2023-09-05 DIAGNOSIS — M25.561 ACUTE PAIN OF RIGHT KNEE: Primary | ICD-10-CM

## 2023-09-05 PROCEDURE — 96372 THER/PROPH/DIAG INJ SC/IM: CPT

## 2023-09-05 PROCEDURE — 73630 X-RAY EXAM OF FOOT: CPT

## 2023-09-05 PROCEDURE — 6370000000 HC RX 637 (ALT 250 FOR IP): Performed by: STUDENT IN AN ORGANIZED HEALTH CARE EDUCATION/TRAINING PROGRAM

## 2023-09-05 PROCEDURE — 73502 X-RAY EXAM HIP UNI 2-3 VIEWS: CPT

## 2023-09-05 PROCEDURE — 6360000002 HC RX W HCPCS: Performed by: STUDENT IN AN ORGANIZED HEALTH CARE EDUCATION/TRAINING PROGRAM

## 2023-09-05 PROCEDURE — 73564 X-RAY EXAM KNEE 4 OR MORE: CPT

## 2023-09-05 PROCEDURE — 99284 EMERGENCY DEPT VISIT MOD MDM: CPT

## 2023-09-05 RX ORDER — ACETAMINOPHEN 500 MG
1000 TABLET ORAL ONCE
Status: COMPLETED | OUTPATIENT
Start: 2023-09-05 | End: 2023-09-05

## 2023-09-05 RX ORDER — KETOROLAC TROMETHAMINE 30 MG/ML
30 INJECTION, SOLUTION INTRAMUSCULAR; INTRAVENOUS ONCE
Status: COMPLETED | OUTPATIENT
Start: 2023-09-05 | End: 2023-09-05

## 2023-09-05 RX ORDER — LIDOCAINE 4 G/G
1 PATCH TOPICAL ONCE
Status: DISCONTINUED | OUTPATIENT
Start: 2023-09-05 | End: 2023-09-06 | Stop reason: HOSPADM

## 2023-09-05 RX ADMIN — ACETAMINOPHEN 1000 MG: 500 TABLET ORAL at 22:17

## 2023-09-05 RX ADMIN — KETOROLAC TROMETHAMINE 30 MG: 30 INJECTION, SOLUTION INTRAMUSCULAR; INTRAVENOUS at 22:17

## 2023-09-05 ASSESSMENT — PAIN DESCRIPTION - LOCATION
LOCATION: KNEE
LOCATION: KNEE

## 2023-09-05 ASSESSMENT — PAIN SCALES - GENERAL
PAINLEVEL_OUTOF10: 9
PAINLEVEL_OUTOF10: 9

## 2023-09-05 ASSESSMENT — PAIN DESCRIPTION - ORIENTATION: ORIENTATION: RIGHT

## 2023-09-05 NOTE — ED NOTES
Pt arrives to ED 43 via triage. Pt co R knee pain. Pt states she tripped over her sandals this morning around 1130 and fell onto her knee. Pt states as the day went on her pain became more severe and knee started to tighten up. Pt respirations are even and unlabored, pt is alert and oriented X 4, speaking in complete sentences, bed is in the lowest position, call light is within reach, NAD noted. Will continue to follow plan of care.         Vonnie Flor RN  09/05/23 [id number]

## 2023-09-06 NOTE — ED NOTES
Pt respirations are even and unlabored, pt is alert and oriented X 4, speaking in complete sentences, bed is in the lowest position, call light is within reach, NAD noted. Will continue to follow plan of care.         Padmini Elise RN  09/05/23 [id number]

## 2023-09-06 NOTE — ED NOTES
Pt respirations are even and unlabored, pt is alert and oriented X 4, speaking in complete sentences, bed is in the lowest position, call light is within reach, NAD noted. Will continue to follow plan of care.         Ancelmo Guardado RN  09/05/23 [id number]

## 2023-09-06 NOTE — ED NOTES
Pt respirations are even and unlabored, pt is alert and oriented X 4, speaking in complete sentences, bed is in the lowest position, call light is within reach, NAD noted. Will continue to follow plan of care.         Kaitlin Gallegos RN  09/05/23 [id number]

## 2023-09-06 NOTE — DISCHARGE INSTRUCTIONS
Call today or tomorrow to follow up with Mike Fitzpatrick MD  in 2 days. Use an ice pack or bag filled with ice and apply to the injured area 3 - 4 times a day for 15 - 20 minutes each time. Use ibuprofen or Tylenol (unless prescribed medications that have Tylenol in it) for pain. You can take over the counter Ibuprofen (advil) tablets (4 every 8 hours or 3 every 6 hours or 2 every 4 hours)    Use your crutches for the next several days until you are able to take 10 steps without pain. Return to the Emergency Department for worsening of pain, swelling to the knee, inability to move your knee, unable to walk, any other care or concern.

## 2023-10-30 ENCOUNTER — HOSPITAL ENCOUNTER (OUTPATIENT)
Age: 68
Setting detail: SPECIMEN
Discharge: HOME | End: 2023-10-30

## 2023-10-30 LAB
ALBUMIN SERPL-MCNC: 3.8 G/DL (ref 3.5–5.2)
ALBUMIN/GLOB SERPL: 1.1 {RATIO} (ref 1–2.5)
ALP SERPL-CCNC: 90 U/L (ref 35–104)
ALT SERPL-CCNC: 13 U/L (ref 5–33)
ANION GAP SERPL CALCULATED.3IONS-SCNC: 8 MMOL/L (ref 9–17)
AST SERPL-CCNC: 17 U/L
BASOPHILS # BLD: 0.04 K/UL (ref 0–0.2)
BASOPHILS NFR BLD: 1 % (ref 0–2)
BILIRUB SERPL-MCNC: 0.2 MG/DL (ref 0.3–1.2)
BUN SERPL-MCNC: 7 MG/DL (ref 8–23)
CALCIUM SERPL-MCNC: 10.8 MG/DL (ref 8.6–10.4)
CHLORIDE SERPL-SCNC: 105 MMOL/L (ref 98–107)
CO2 SERPL-SCNC: 26 MMOL/L (ref 20–31)
CREAT SERPL-MCNC: 0.8 MG/DL (ref 0.5–0.9)
EOSINOPHIL # BLD: 0.19 K/UL (ref 0–0.44)
EOSINOPHILS RELATIVE PERCENT: 4 % (ref 1–4)
ERYTHROCYTE [DISTWIDTH] IN BLOOD BY AUTOMATED COUNT: 14.1 % (ref 11.8–14.4)
GFR SERPL CREATININE-BSD FRML MDRD: >60 ML/MIN/1.73M2
GLUCOSE SERPL-MCNC: 113 MG/DL (ref 70–99)
HCT VFR BLD AUTO: 43 % (ref 36.3–47.1)
HGB BLD-MCNC: 14 G/DL (ref 11.9–15.1)
IMM GRANULOCYTES # BLD AUTO: <0.03 K/UL (ref 0–0.3)
IMM GRANULOCYTES NFR BLD: 0 %
LYMPHOCYTES NFR BLD: 1.53 K/UL (ref 1.1–3.7)
LYMPHOCYTES RELATIVE PERCENT: 29 % (ref 24–43)
MCH RBC QN AUTO: 29.6 PG (ref 25.2–33.5)
MCHC RBC AUTO-ENTMCNC: 32.6 G/DL (ref 28.4–34.8)
MCV RBC AUTO: 90.9 FL (ref 82.6–102.9)
MONOCYTES NFR BLD: 0.53 K/UL (ref 0.1–1.2)
MONOCYTES NFR BLD: 10 % (ref 3–12)
NEUTROPHILS NFR BLD: 56 % (ref 36–65)
NEUTS SEG NFR BLD: 2.9 K/UL (ref 1.5–8.1)
NRBC BLD-RTO: 0 PER 100 WBC
PLATELET # BLD AUTO: 313 K/UL (ref 138–453)
PMV BLD AUTO: 10.9 FL (ref 8.1–13.5)
POTASSIUM SERPL-SCNC: 4.1 MMOL/L (ref 3.7–5.3)
PROT SERPL-MCNC: 7.3 G/DL (ref 6.4–8.3)
RBC # BLD AUTO: 4.73 M/UL (ref 3.95–5.11)
SODIUM SERPL-SCNC: 139 MMOL/L (ref 135–144)
WBC OTHER # BLD: 5.2 K/UL (ref 3.5–11.3)

## 2023-11-24 ENCOUNTER — HOSPITAL ENCOUNTER (OUTPATIENT)
Dept: MAMMOGRAPHY | Age: 68
End: 2023-11-24
Payer: MEDICARE

## 2023-11-24 VITALS — WEIGHT: 255 LBS | HEIGHT: 64 IN | BODY MASS INDEX: 43.54 KG/M2

## 2023-11-24 DIAGNOSIS — Z12.31 ENCOUNTER FOR SCREENING MAMMOGRAM FOR MALIGNANT NEOPLASM OF BREAST: ICD-10-CM

## 2023-11-24 PROCEDURE — 77063 BREAST TOMOSYNTHESIS BI: CPT

## 2024-01-30 ENCOUNTER — HOSPITAL ENCOUNTER (OUTPATIENT)
Dept: GENERAL RADIOLOGY | Age: 69
Discharge: HOME | End: 2024-02-01
Payer: MEDICARE

## 2024-01-30 ENCOUNTER — HOSPITAL ENCOUNTER (OUTPATIENT)
Age: 69
Discharge: HOME | End: 2024-02-01
Payer: MEDICARE

## 2024-01-30 DIAGNOSIS — R05.9 COUGH, UNSPECIFIED TYPE: ICD-10-CM

## 2024-01-30 PROCEDURE — 71046 X-RAY EXAM CHEST 2 VIEWS: CPT

## 2024-03-28 ENCOUNTER — OFFICE VISIT (OUTPATIENT)
Dept: FAMILY MEDICINE CLINIC | Age: 69
End: 2024-03-28
Payer: MEDICARE

## 2024-03-28 ENCOUNTER — HOSPITAL ENCOUNTER (OUTPATIENT)
Age: 69
Setting detail: SPECIMEN
Discharge: HOME | End: 2024-03-28

## 2024-03-28 VITALS
HEIGHT: 65 IN | HEART RATE: 80 BPM | BODY MASS INDEX: 42.15 KG/M2 | WEIGHT: 253 LBS | SYSTOLIC BLOOD PRESSURE: 122 MMHG | DIASTOLIC BLOOD PRESSURE: 70 MMHG

## 2024-03-28 DIAGNOSIS — Z12.11 SCREENING FOR COLON CANCER: ICD-10-CM

## 2024-03-28 DIAGNOSIS — N95.1 MENOPAUSAL STATE: ICD-10-CM

## 2024-03-28 DIAGNOSIS — E08.00 DIABETES MELLITUS DUE TO UNDERLYING CONDITION WITH HYPEROSMOLARITY WITHOUT COMA, WITHOUT LONG-TERM CURRENT USE OF INSULIN (HCC): Primary | ICD-10-CM

## 2024-03-28 DIAGNOSIS — E21.3 HYPERPARATHYROIDISM, UNSPECIFIED (HCC): ICD-10-CM

## 2024-03-28 DIAGNOSIS — Z11.59 NEED FOR HEPATITIS C SCREENING TEST: ICD-10-CM

## 2024-03-28 DIAGNOSIS — Z91.81 AT HIGH RISK FOR FALLS: ICD-10-CM

## 2024-03-28 LAB
HBA1C MFR BLD: 7 %
HCV AB SERPL QL IA: NONREACTIVE

## 2024-03-28 PROCEDURE — 99203 OFFICE O/P NEW LOW 30 MIN: CPT

## 2024-03-28 PROCEDURE — 83036 HEMOGLOBIN GLYCOSYLATED A1C: CPT

## 2024-03-28 PROCEDURE — 1123F ACP DISCUSS/DSCN MKR DOCD: CPT

## 2024-03-28 SDOH — ECONOMIC STABILITY: FOOD INSECURITY: WITHIN THE PAST 12 MONTHS, THE FOOD YOU BOUGHT JUST DIDN'T LAST AND YOU DIDN'T HAVE MONEY TO GET MORE.: NEVER TRUE

## 2024-03-28 SDOH — ECONOMIC STABILITY: FOOD INSECURITY: WITHIN THE PAST 12 MONTHS, YOU WORRIED THAT YOUR FOOD WOULD RUN OUT BEFORE YOU GOT MONEY TO BUY MORE.: NEVER TRUE

## 2024-03-28 SDOH — ECONOMIC STABILITY: HOUSING INSECURITY
IN THE LAST 12 MONTHS, WAS THERE A TIME WHEN YOU DID NOT HAVE A STEADY PLACE TO SLEEP OR SLEPT IN A SHELTER (INCLUDING NOW)?: NO

## 2024-03-28 SDOH — ECONOMIC STABILITY: INCOME INSECURITY: HOW HARD IS IT FOR YOU TO PAY FOR THE VERY BASICS LIKE FOOD, HOUSING, MEDICAL CARE, AND HEATING?: NOT HARD AT ALL

## 2024-03-28 ASSESSMENT — ENCOUNTER SYMPTOMS
BACK PAIN: 0
WHEEZING: 0
SHORTNESS OF BREATH: 0
ABDOMINAL PAIN: 0
COUGH: 1

## 2024-03-28 ASSESSMENT — PATIENT HEALTH QUESTIONNAIRE - PHQ9
SUM OF ALL RESPONSES TO PHQ QUESTIONS 1-9: 0
SUM OF ALL RESPONSES TO PHQ9 QUESTIONS 1 & 2: 0
2. FEELING DOWN, DEPRESSED OR HOPELESS: NOT AT ALL
1. LITTLE INTEREST OR PLEASURE IN DOING THINGS: NOT AT ALL
SUM OF ALL RESPONSES TO PHQ QUESTIONS 1-9: 0

## 2024-03-28 NOTE — PROGRESS NOTES
Attending Physician Statement  I have discussed the care of Whit Morel, including pertinent history and exam findings,  with the resident. I have reviewed the key elements of all parts of the encounter with the resident.  I agree with the assessment, plan and orders as documented by the resident.  (GE Modifier)    Akiko Aguiar MD  
Visit Information    Have you changed or started any medications since your last visit including any over-the-counter medicines, vitamins, or herbal medicines? no   Have you stopped taking any of your medications? Is so, why? -  no  Are you having any side effects from any of your medications? - no    Have you seen any other physician or provider since your last visit?  no   Have you had any other diagnostic tests since your last visit?  yes - Labs - xray   Have you been seen in the emergency room and/or had an admission in a hospital since we last saw you?  no   Have you had your routine dental cleaning in the past 6 months?  no     Do you have an active MyChart account? If no, what is the barrier?  Yes    Patient Care Team:  Wendy Nicole MD as PCP - General (Family Medicine)    Medical History Review  Past Medical, Family, and Social History reviewed and does not contribute to the patient presenting condition    Health Maintenance   Topic Date Due    COVID-19 Vaccine (1) Never done    Depression Screen  Never done    Hepatitis C screen  Never done    DTaP/Tdap/Td vaccine (1 - Tdap) Never done    Shingles vaccine (1 of 2) Never done    DEXA (modify frequency per FRAX score)  Never done    Colorectal Cancer Screen  04/01/2014    Respiratory Syncytial Virus (RSV) Pregnant or age 60 yrs+ (1 - 1-dose 60+ series) Never done    Pneumococcal 65+ years Vaccine (1 of 1 - PCV) Never done    A1C test (Diabetic or Prediabetic)  02/11/2023    Flu vaccine (1) Never done    Annual Wellness Visit (Medicare Advantage)  Never done    Breast cancer screen  11/24/2025    Lipids  06/05/2028    Hepatitis A vaccine  Aged Out    Hepatitis B vaccine  Aged Out    Hib vaccine  Aged Out    Polio vaccine  Aged Out    Meningococcal (ACWY) vaccine  Aged Out    Diabetic Alb to Cr ratio (uACR) test  Discontinued    Cervical cancer screen  Discontinued             
126/71   02/14/22 133/71       Physical Exam  Constitutional:       General: She is not in acute distress.     Appearance: She is not ill-appearing.   Neck:      Vascular: No carotid bruit.   Cardiovascular:      Rate and Rhythm: Normal rate and regular rhythm.      Pulses: Normal pulses.      Heart sounds: Normal heart sounds.   Pulmonary:      Breath sounds: Normal breath sounds.   Musculoskeletal:      Cervical back: Normal range of motion.      Right lower leg: No edema.      Left lower leg: No edema.   Neurological:      Mental Status: She is alert.         Lab Results   Component Value Date    WBC 5.2 10/30/2023    HGB 14.0 10/30/2023    HCT 43.0 10/30/2023     10/30/2023    CHOL 115 02/11/2020    TRIG 115 02/11/2020    HDL 49 06/05/2023    ALT 13 10/30/2023    AST 17 10/30/2023     10/30/2023    K 4.1 10/30/2023     10/30/2023    CREATININE 0.8 10/30/2023    BUN 7 (L) 10/30/2023    CO2 26 10/30/2023    TSH 1.77 06/05/2023    INR 1.0 04/30/2020    GLUF 112 (H) 11/28/2022    LABA1C 7.0 03/28/2024     Lab Results   Component Value Date    CALCIUM 10.8 (H) 10/30/2023    PHOS 2.7 03/31/2020     Lab Results   Component Value Date    LDLCHOLESTEROL 43 06/05/2023       Assessment and Plan:    1. Diabetes mellitus due to underlying condition with hyperosmolarity without coma, without long-term current use of insulin (HCC)  Patient's A1c was 7.0 today.  An increase from 6.6 3 years ago.  Patient would like to try lifestyle modifications first.  Agree with test at this time.  Patient is also meeting up with a weight loss physician and physical therapy will be started.  Counseled on diet.  Will recheck A1c in 3 months. ASCVD risk is 9.7-11.9%; will recheck her lipid panel in 6 months when it is due. Can consider adding a statin at that time if risk is >10%. Pt is motivated today to incorporate more healthy foods and exercise into her life.    - POCT glycosylated hemoglobin (Hb A1C)    2. Need for

## 2024-04-03 ENCOUNTER — OFFICE VISIT (OUTPATIENT)
Dept: SURGERY | Age: 69
End: 2024-04-03
Payer: MEDICARE

## 2024-04-03 VITALS
WEIGHT: 250 LBS | HEIGHT: 64 IN | SYSTOLIC BLOOD PRESSURE: 142 MMHG | BODY MASS INDEX: 42.68 KG/M2 | HEART RATE: 92 BPM | DIASTOLIC BLOOD PRESSURE: 79 MMHG

## 2024-04-03 DIAGNOSIS — K21.9 GASTROESOPHAGEAL REFLUX DISEASE, UNSPECIFIED WHETHER ESOPHAGITIS PRESENT: Primary | ICD-10-CM

## 2024-04-03 DIAGNOSIS — Z12.11 ENCOUNTER FOR SCREENING COLONOSCOPY: ICD-10-CM

## 2024-04-03 PROCEDURE — 1123F ACP DISCUSS/DSCN MKR DOCD: CPT | Performed by: STUDENT IN AN ORGANIZED HEALTH CARE EDUCATION/TRAINING PROGRAM

## 2024-04-03 PROCEDURE — 99202 OFFICE O/P NEW SF 15 MIN: CPT | Performed by: STUDENT IN AN ORGANIZED HEALTH CARE EDUCATION/TRAINING PROGRAM

## 2024-04-03 NOTE — PROGRESS NOTES
Visit Information    Have you changed or started any medications since your last visit including any over-the-counter medicines, vitamins, or herbal medicines? no   Have you stopped taking any of your medications? Is so, why? -  no  Are you having any side effects from any of your medications? - no    Have you seen any other physician or provider since your last visit?  yes - Corey (Fam Med) 3/28/24   Have you had any other diagnostic tests since your last visit?  yes - Labs 3/28/24   Have you been seen in the emergency room and/or had an admission in a hospital since we last saw you?  no   Have you had your routine dental cleaning in the past 6 months?  no     Do you have an active MyChart account? If no, what is the barrier?  Yes    Patient Care Team:  Wendy Nicole MD as PCP - General (Family Medicine)    Medical History Review  Past Medical, Family, and Social History reviewed and does contribute to the patient presenting condition    Health Maintenance   Topic Date Due    COVID-19 Vaccine (1) Never done    Pneumococcal 65+ years Vaccine (1 of 2 - PCV) Never done    Diabetic foot exam  Never done    Diabetic retinal exam  Never done    DTaP/Tdap/Td vaccine (1 - Tdap) Never done    Shingles vaccine (1 of 2) Never done    DEXA (modify frequency per FRAX score)  Never done    Colorectal Cancer Screen  04/01/2014    Respiratory Syncytial Virus (RSV) Pregnant or age 60 yrs+ (1 - 1-dose 60+ series) Never done    Annual Wellness Visit (Medicare Advantage)  Never done    Diabetic Alb to Cr ratio (uACR) test  06/05/2024    Lipids  06/05/2024    Flu vaccine (Season Ended) 08/01/2024    GFR test (Diabetes, CKD 3-4, OR last GFR 15-59)  10/30/2024    A1C test (Diabetic or Prediabetic)  03/28/2025    Depression Screen  03/28/2025    Breast cancer screen  11/24/2025    Hepatitis C screen  Completed    Hepatitis A vaccine  Aged Out    Hepatitis B vaccine  Aged Out    Hib vaccine  Aged Out    Polio vaccine  Aged Out

## 2024-04-03 NOTE — H&P
recent weight loss, fatigue, fevers, chills.  HEENT: No rhinorrhea, dysphagia, odynphagia.   CV: No history of MI, recent chest pain.  RESP: Denies shortness of breath, COPD, asthma.  GI: daily GERD w/ OTC use  : Denies increased frequency or dysuria.  HEM:: Denies history of anemia or DVTs  ENDO: Patient has DM-II w/ A1C 7  NEURO: Denies history of CVA, TIA.    Physical Exam:  Vitals:    04/03/24 0931   BP: (!) 141/82   Pulse: 96     General:A & O x3  HEENT:  NCAT, PERRL, EMOI, oral mucus membrane pink and moist, no mass palpated on neck exam  Heart: RRR  Lungs: equal chest rise and fall  Abdomen: soft, NTND, w/o guarding or rebound  Extremity: negative  SKIN: Skin color, texture, turgor normal. No rashes or lesions.  Neuro: No motor or sensory deficits appreciated.  MK:normal throughout upper and lower extremities    Assessment      Diagnosis Orders   1. Gastroesophageal reflux disease, unspecified whether esophagitis present        2. Encounter for screening colonoscopy            Plan   Will perform esophagogastroduodenoscopy and screening colonoscopy under same anesthesia to evaluate for GERD/PUD pathology and obtain biopsy samples for H.Pylori and will also perform screening colonoscopy  Informed consent obtained and witnessed, discussed risks of bleeding, perforation, bloating, and post polypectomy syndrome    Jose Martin Gutierrez DO  4/3/2024     Attending Physician Statement  I have discussed the case with Dr Gutierrez, including pertinent history and exam findings with the resident. I have seen and examined the patient and the key elements of the encounter have been performed by me.  I agree with the assessment, plan and orders as documented by the resident.      Electronically signed by Junior Moncada IV, DO  on 4/10/2024 at 8:48 AM

## 2024-04-16 ENCOUNTER — HOSPITAL ENCOUNTER (OUTPATIENT)
Dept: MAMMOGRAPHY | Age: 69
Discharge: HOME | End: 2024-04-18
Payer: MEDICARE

## 2024-04-16 DIAGNOSIS — E21.3 HYPERPARATHYROIDISM, UNSPECIFIED (HCC): ICD-10-CM

## 2024-04-16 DIAGNOSIS — N95.1 MENOPAUSAL STATE: ICD-10-CM

## 2024-04-16 PROCEDURE — 77080 DXA BONE DENSITY AXIAL: CPT

## 2024-04-22 ENCOUNTER — HOSPITAL ENCOUNTER (OUTPATIENT)
Dept: PHYSICAL THERAPY | Age: 69
Setting detail: THERAPIES SERIES
Discharge: HOME | End: 2024-04-22
Payer: MEDICARE

## 2024-04-22 PROCEDURE — 97110 THERAPEUTIC EXERCISES: CPT

## 2024-04-22 PROCEDURE — 97161 PT EVAL LOW COMPLEX 20 MIN: CPT

## 2024-04-22 NOTE — CONSULTS
- 3 sets - 10 reps  - Standing Single Leg Stance with Counter Support  - 1 x daily - 3 x weekly - 3 sets - 10 reps - 20 hold    Pain altered Tx:  [] Yes  [x] No  Action:    Pt. Education:  [x] Plans/Goals, Risks/Benefits discussed  [x] Home exercise program:   Method of Education: [x] Verbal  [x] Demo  [x] Written  Comprehension of Education:  [x] Verbalizes understanding.  [x] Demonstrates understanding.  [x] Needs Review.  [] Demonstrates/verbalizes understanding of HEP/Ed previously given.      Specific Instructions for next treatment:: B LE and core strengthening, gait and balance training, Trial powersteps in shoe to improve arch support.       Evaluation Complexity:  History (Personal factors, comorbidities) [] 0 [x] 1-2 [] 3+   Exam (limitations, restrictions) [] 1-2 [x] 3 [] 4+   Clinical presentation (progression) [x] Stable [] Evolving  [] Unstable   Decision Making [x] Low [] Moderate [] High    [x] Low Complexity [] Moderate Complexity [] High Complexity       Treatment Charges: Mins Units   [x] Evaluation       [x]  Low       []  Moderate       []  High 30 1   []  Modalities     []  Ther Exercise 15 1   []  Manual Therapy     []  Ther Activities     []  Aquatics     []  Vasocompression     []  Other       TOTAL BILLABLE TIME: 45      Time in:1600     Time out:1645    Electronically signed by: Jean Claude Odonnell, PT

## 2024-04-27 PROBLEM — Z12.11 SCREENING FOR COLON CANCER: Status: RESOLVED | Noted: 2024-03-28 | Resolved: 2024-04-27

## 2024-04-27 PROBLEM — Z11.59 NEED FOR HEPATITIS C SCREENING TEST: Status: RESOLVED | Noted: 2024-03-28 | Resolved: 2024-04-27

## 2024-04-30 ENCOUNTER — HOSPITAL ENCOUNTER (OUTPATIENT)
Dept: PHYSICAL THERAPY | Age: 69
Setting detail: THERAPIES SERIES
Discharge: HOME | End: 2024-04-30
Payer: MEDICARE

## 2024-04-30 NOTE — DISCHARGE SUMMARY
[x] [facility]  Outpatient Rehabilitation &  Therapy  [address]  [phone]  [phone] [] [facility]  Outpatient Rehabilitation &  Therapy  [address]  P: [phone]  F: [phone] [] [facility]  Outpatient Rehabilitation &  Therapy  [address]  P: [phone]  F: [phone] [] [facility]  Outpatient Rehabilitation &  Therapy  [address]  [phone]  [phone] [] [facility]  Outpatient Rehabilitation &  Therapy  [address]   P: [phone]  F: [phone]  [] [facility]  Outpatient Rehabilitation &  Therapy  [address]  P: [phone]  F: [phone] [] [facility]  Outpatient Rehabilitation &  Therapy  [address]  P: [phone]  F: [phone] [] [facility]  Outpatient Rehabilitation &  Therapy  [address]  P: [phone]  F: [phone] [] [facility]  Outpatient Rehabilitation &  Therapy  [address]  P: [phone]  F: [phone]  [] [facility] Outpatient Rehabilitation &  Therapy  [address]  P: [phone]  F: [phone]        Physical Therapy Discharge Note    Date: 2024      Patient: Whit Morel  : 1955  MRN: [id number]    Physician: Wendy Nicole MD                              Insurance: [employer] MEDICARE SMARTFIT PPO Medicare Guidelines $50 copay   Medical Diagnosis:   At high risk for falls (Z91.81)     Rehab Codes: Z91.81, R26.89, M62.81M79.605  Onset Date: 2023                                 Next 's appt: PRN  Total visits attended:1  Cancels/No shows:  Date of initial visit: 24                Date of final visit: 24      Subjective:  Refer to most recent note in Epic.    Objective:  Refer to most recent note in

## 2024-05-09 ENCOUNTER — HOSPITAL ENCOUNTER (OUTPATIENT)
Age: 69
Setting detail: OUTPATIENT SURGERY
Discharge: HOME | End: 2024-05-09
Attending: SURGERY | Admitting: SURGERY
Payer: MEDICARE

## 2024-05-09 ENCOUNTER — ANESTHESIA EVENT (OUTPATIENT)
Dept: OPERATING ROOM | Age: 69
End: 2024-05-09
Payer: MEDICARE

## 2024-05-09 ENCOUNTER — ANESTHESIA (OUTPATIENT)
Dept: OPERATING ROOM | Age: 69
End: 2024-05-09
Payer: MEDICARE

## 2024-05-09 VITALS
RESPIRATION RATE: 18 BRPM | SYSTOLIC BLOOD PRESSURE: 158 MMHG | DIASTOLIC BLOOD PRESSURE: 88 MMHG | HEIGHT: 64 IN | HEART RATE: 78 BPM | OXYGEN SATURATION: 98 % | WEIGHT: 254 LBS | BODY MASS INDEX: 43.36 KG/M2 | TEMPERATURE: 97.3 F

## 2024-05-09 DIAGNOSIS — K21.9 GASTROESOPHAGEAL REFLUX DISEASE, UNSPECIFIED WHETHER ESOPHAGITIS PRESENT: ICD-10-CM

## 2024-05-09 DIAGNOSIS — Z12.11 SCREENING FOR COLON CANCER: ICD-10-CM

## 2024-05-09 LAB — GLUCOSE BLD-MCNC: 100 MG/DL (ref 65–105)

## 2024-05-09 PROCEDURE — 82947 ASSAY GLUCOSE BLOOD QUANT: CPT

## 2024-05-09 PROCEDURE — 7100000011 HC PHASE II RECOVERY - ADDTL 15 MIN: Performed by: SURGERY

## 2024-05-09 PROCEDURE — 2709999900 HC NON-CHARGEABLE SUPPLY: Performed by: SURGERY

## 2024-05-09 PROCEDURE — 3609012400 HC EGD TRANSORAL BIOPSY SINGLE/MULTIPLE: Performed by: SURGERY

## 2024-05-09 PROCEDURE — 6360000002 HC RX W HCPCS: Performed by: NURSE ANESTHETIST, CERTIFIED REGISTERED

## 2024-05-09 PROCEDURE — 3609010400 HC COLONOSCOPY POLYPECTOMY HOT BIOPSY: Performed by: SURGERY

## 2024-05-09 PROCEDURE — 2500000003 HC RX 250 WO HCPCS: Performed by: NURSE ANESTHETIST, CERTIFIED REGISTERED

## 2024-05-09 PROCEDURE — 3700000000 HC ANESTHESIA ATTENDED CARE: Performed by: SURGERY

## 2024-05-09 PROCEDURE — 3700000001 HC ADD 15 MINUTES (ANESTHESIA): Performed by: SURGERY

## 2024-05-09 PROCEDURE — 88305 TISSUE EXAM BY PATHOLOGIST: CPT

## 2024-05-09 PROCEDURE — 2580000003 HC RX 258: Performed by: ANESTHESIOLOGY

## 2024-05-09 PROCEDURE — 7100000010 HC PHASE II RECOVERY - FIRST 15 MIN: Performed by: SURGERY

## 2024-05-09 RX ORDER — SODIUM CHLORIDE 9 MG/ML
INJECTION, SOLUTION INTRAVENOUS CONTINUOUS
Status: DISCONTINUED | OUTPATIENT
Start: 2024-05-09 | End: 2024-05-09 | Stop reason: HOSPADM

## 2024-05-09 RX ORDER — SODIUM CHLORIDE, SODIUM LACTATE, POTASSIUM CHLORIDE, CALCIUM CHLORIDE 600; 310; 30; 20 MG/100ML; MG/100ML; MG/100ML; MG/100ML
INJECTION, SOLUTION INTRAVENOUS CONTINUOUS
Status: DISCONTINUED | OUTPATIENT
Start: 2024-05-09 | End: 2024-05-09 | Stop reason: HOSPADM

## 2024-05-09 RX ORDER — SODIUM CHLORIDE 0.9 % (FLUSH) 0.9 %
5-40 SYRINGE (ML) INJECTION PRN
Status: DISCONTINUED | OUTPATIENT
Start: 2024-05-09 | End: 2024-05-09 | Stop reason: HOSPADM

## 2024-05-09 RX ORDER — PROPOFOL 10 MG/ML
INJECTION, EMULSION INTRAVENOUS PRN
Status: DISCONTINUED | OUTPATIENT
Start: 2024-05-09 | End: 2024-05-09 | Stop reason: SDUPTHER

## 2024-05-09 RX ORDER — KETAMINE HCL IN NACL, ISO-OSM 100MG/10ML
SYRINGE (ML) INJECTION PRN
Status: DISCONTINUED | OUTPATIENT
Start: 2024-05-09 | End: 2024-05-09 | Stop reason: SDUPTHER

## 2024-05-09 RX ORDER — SODIUM CHLORIDE 0.9 % (FLUSH) 0.9 %
5-40 SYRINGE (ML) INJECTION EVERY 12 HOURS SCHEDULED
Status: DISCONTINUED | OUTPATIENT
Start: 2024-05-09 | End: 2024-05-09 | Stop reason: HOSPADM

## 2024-05-09 RX ORDER — LIDOCAINE HYDROCHLORIDE 10 MG/ML
1 INJECTION, SOLUTION EPIDURAL; INFILTRATION; INTRACAUDAL; PERINEURAL
Status: DISCONTINUED | OUTPATIENT
Start: 2024-05-10 | End: 2024-05-09 | Stop reason: HOSPADM

## 2024-05-09 RX ORDER — CINACALCET 60 MG/1
TABLET, FILM COATED ORAL
COMMUNITY

## 2024-05-09 RX ORDER — SODIUM CHLORIDE 9 MG/ML
INJECTION, SOLUTION INTRAVENOUS PRN
Status: DISCONTINUED | OUTPATIENT
Start: 2024-05-09 | End: 2024-05-09 | Stop reason: HOSPADM

## 2024-05-09 RX ORDER — LIDOCAINE HYDROCHLORIDE 20 MG/ML
INJECTION, SOLUTION EPIDURAL; INFILTRATION; INTRACAUDAL; PERINEURAL PRN
Status: DISCONTINUED | OUTPATIENT
Start: 2024-05-09 | End: 2024-05-09 | Stop reason: SDUPTHER

## 2024-05-09 RX ADMIN — LIDOCAINE HYDROCHLORIDE 60 MG: 20 INJECTION, SOLUTION EPIDURAL; INFILTRATION; INTRACAUDAL; PERINEURAL at 15:13

## 2024-05-09 RX ADMIN — Medication 20 MG: at 15:26

## 2024-05-09 RX ADMIN — PROPOFOL 100 MG: 10 INJECTION, EMULSION INTRAVENOUS at 15:13

## 2024-05-09 RX ADMIN — PROPOFOL 120 MCG/KG/MIN: 10 INJECTION, EMULSION INTRAVENOUS at 15:15

## 2024-05-09 RX ADMIN — SODIUM CHLORIDE, POTASSIUM CHLORIDE, SODIUM LACTATE AND CALCIUM CHLORIDE: 600; 310; 30; 20 INJECTION, SOLUTION INTRAVENOUS at 13:46

## 2024-05-09 RX ADMIN — Medication 10 MG: at 15:43

## 2024-05-09 ASSESSMENT — PAIN DESCRIPTION - ORIENTATION: ORIENTATION: LEFT

## 2024-05-09 ASSESSMENT — ENCOUNTER SYMPTOMS: SHORTNESS OF BREATH: 0

## 2024-05-09 ASSESSMENT — PAIN DESCRIPTION - LOCATION: LOCATION: LEG

## 2024-05-09 ASSESSMENT — PAIN SCALES - GENERAL: PAINLEVEL_OUTOF10: 3

## 2024-05-09 ASSESSMENT — PAIN DESCRIPTION - PAIN TYPE: TYPE: CHRONIC PAIN

## 2024-05-09 ASSESSMENT — PAIN DESCRIPTION - FREQUENCY: FREQUENCY: CONTINUOUS

## 2024-05-09 ASSESSMENT — PAIN DESCRIPTION - DESCRIPTORS: DESCRIPTORS: ACHING

## 2024-05-09 NOTE — ANESTHESIA POSTPROCEDURE EVALUATION
Department of Anesthesiology  Postprocedure Note    Patient: Whit Morel  MRN: [id number]  YOB: 1955  Date of evaluation: 5/9/2024    Procedure Summary       Date: 05/09/24 Room / Location: [address]    Anesthesia Start: 1509 Anesthesia Stop: 1605    Procedures:       ESOPHAGOGASTRODUODENOSCOPY BIOPSY      COLONOSCOPY POLYPECTOMY HOT BIOPSY Diagnosis:       Screening for colon cancer      Gastroesophageal reflux disease, unspecified whether esophagitis present      (Screening for colon cancer [Z12.11])      (Gastroesophageal reflux disease, unspecified whether esophagitis present [K21.9])    Surgeons: Junior Moncada IV, DO Responsible Provider: Rosalee Kumar MD    Anesthesia Type: general ASA Status: 2            Anesthesia Type: No value filed.    Marie Phase I: Marie Score: 8    Marie Phase II: Marie Score: 10    Anesthesia Post Evaluation    Patient location during evaluation: PACU  Patient participation: complete - patient participated  Level of consciousness: awake  Airway patency: patent  Nausea & Vomiting: no nausea  Cardiovascular status: blood pressure returned to baseline  Respiratory status: acceptable  Hydration status: euvolemic  Comments: Multimodal analgesia pain management as indicated by procedure  Multimodal analgesia pain management approach  Pain management: adequate    No notable events documented.

## 2024-05-09 NOTE — OP NOTE
Operative Note      Patient: Whit Morel  YOB: 1955  MRN: [id number]    Date of Procedure: 5/9/2024    Pre-Op Diagnosis Codes:     * Screening for colon cancer [Z12.11]     * Gastroesophageal reflux disease, unspecified whether esophagitis present [K21.9]    Post-Op Diagnosis:   Gastritis  Duodenitis  Right colon polyp       Procedure(s):  ESOPHAGOGASTRODUODENOSCOPY BIOPSY  COLONOSCOPY POLYPECTOMY HOT BIOPSY    Surgeon(s):  Junior Moncada IV, DO    Assistant:   * No surgical staff found *    Anesthesia: Monitor Anesthesia Care    Estimated Blood Loss (mL): Minimal    Complications: None    Specimens:   ID Type Source Tests Collected by Time Destination   A : Gastric Biopsy for H-Pylori Tissue Stomach SURGICAL PATHOLOGY Junior Moncada IV, DO 5/9/2024 1521        Implants:  * No implants in log *      Drains: * No LDAs found *    Findings:  Infection Present At Time Of Surgery (PATOS) (choose all levels that have infection present):  No infection present  Other Findings: see above    Detailed Description of Procedure:   The patient was given monitored anesthesia care. The patient was given oxygen by nasal cannula. The endoscope was inserted orally and advanced under direct vision through the esophagus, through the stomach, through the pylorus, and into the descending duodenum.      Findings:  Duodenum:     Descending: duodenitis    Bulb: duodenitis    Stomach:    Antrum: gastritis    Body: gastritis    Fundus: gastritis    Esophagus: normal    The scope was removed and the patient tolerated the procedure well.  Patient was then repositioned for colonoscopy.    The colonoscope was inserted per rectum and advanced under direct vision to the cecum without difficulty.     Findings:  Cecum/Ascending colon: polyp in the right colon removed completely by hot snare but not retrieved.      Transverse colon: normal    Descending/Sigmoid colon: normal    Rectum/Anus: examined in normal and  normal    Descending/Sigmoid colon: normal    Rectum/Anus: examined in normal and retroflexed positions and was normal    The colon was decompressed and the scope was removed. The patient tolerated the procedure well.     Recommendations:  Repeat colonoscopy in 5 years    Electronically signed by Junior Moncada IV, DO on 5/9/2024 at 3:55 PM     Cardiac Mirvaso Counseling: Mirvaso is a topical medication which can decrease superficial blood flow where applied. Side effects are uncommon and include stinging, redness and allergic reactions.

## 2024-05-09 NOTE — ANESTHESIA PRE PROCEDURE
Department of Anesthesiology  Preprocedure Note       Name:  Whit Morel   Age:  68 y.o.  :  1955                                          MRN:  [id number]         Date:  2024      Surgeon: Surgeon(s):  Junior Moncada IV, DO    Procedure: Procedure(s):  ESOPHAGOGASTRODUODENOSCOPY  COLORECTAL CANCER SCREENING, NOT HIGH RISK    Medications prior to admission:   Prior to Admission medications    Medication Sig Start Date End Date Taking? Authorizing Provider   cinacalcet (SENSIPAR) 60 MG tablet TAKE 1 TABLET (60 MG TOTAL) BY MOUTH EVERY MORNING    Marcello Lincoln MD   Misc Natural Products (GLUCOSAMINE CHOND MSM FORMULA) TABS Take by mouth    ProviderMarcello MD   ALFALFA PO Take by mouth    Marcello Lincoln MD   vitamin D (ERGOCALCIFEROL) 1.25 MG (38159 UT) CAPS capsule Take 1 capsule by mouth once a week    Marcello Lincoln MD   acetaminophen (TYLENOL) 325 MG tablet Take 2 tablets by mouth every 6 hours as needed for Pain    Marcello Lincoln MD   Omega-3 Fatty Acids (OMEGA 3 PO) Take by mouth daily     ProviderMarcello MD       Current medications:    Current Facility-Administered Medications   Medication Dose Route Frequency Provider Last Rate Last Admin   • [START ON 5/10/2024] lidocaine PF 1 % injection 1 mL  1 mL IntraDERmal Once PRN Kota Santiago DO       • 0.9 % sodium chloride infusion   IntraVENous Continuous Kota Santiago DO       • lactated ringers IV soln infusion   IntraVENous Continuous Kota Santiago  mL/hr at 24 1346 New Bag at 24 1346   • sodium chloride flush 0.9 % injection 5-40 mL  5-40 mL IntraVENous 2 times per day Kota Santiago DO       • sodium chloride flush 0.9 % injection 5-40 mL  5-40 mL IntraVENous PRN Kota Santiago DO       • 0.9 % sodium chloride infusion   IntraVENous PRN Kota Santiago DO           Allergies:  No Known Allergies    Problem List:    Patient Active Problem List

## 2024-05-09 NOTE — H&P
Interval H&P Note    Pt Name: Whit Morel  MRN: [id number]  YOB: 1955  Date of evaluation: 5/9/2024      [x] I have reviewed in epic the Endocrinology Progress Note by Dr Broderick dated 4/12/24 attached below for the Interval History and Physical note.     [x] I have examined  Whit Morel, a 68 y.o. female with known DM, thyroid disease and lumbosacral spondylosis managed by PCP and specialists who arrived for her scheduled ESOPHAGOGASTRODUODENOSCOPY, COLORECTAL CANCER SCREENING, NOT HIGH RISK by Junior Moncada IV, DO for Screening for colon cancer; Gastroesophageal reflux disease, unspecified w*.Patient arrived for her procedure and followed the bowel prep until watery clear. Previous  colonoscopy > 10 years ago that was normal per patient.  No FH colon cancer or polyps.  Patient bowel changes, bloody tarry stools, diarrhea alternating with constipation, abdominal pain or unintentional weight loss.  The patient denies new health changes, fever, chills, wheezing, cough, increased SOB, chest pain, open sores or wounds.  +DM POC     Patient recently diagnosed with DM. She is being followed by Endocrinology, Dr Broderick \"I am going to begin metformin 500mg next week likely Monday.\" Last A1c 7.0 3/28/24 Patient is currently following with weight management for a weight loss plan.     Vital signs: BP (!) 140/73   Pulse 88   Temp 97.2 °F (36.2 °C)   Resp 20   Ht 1.626 m (5' 4\")   Wt 115.2 kg (254 lb)   SpO2 96%   BMI 43.60 kg/m²     Allergies:  Patient has no known allergies.    Medications:    Prior to Admission medications    Medication Sig Start Date End Date Taking? Authorizing Provider   cinacalcet (SENSIPAR) 60 MG tablet TAKE 1 TABLET (60 MG TOTAL) BY MOUTH EVERY MORNING    ProviderMarcello MD   Misc Natural Products (GLUCOSAMINE CHOND MSM FORMULA) TABS Take by mouth    Marcello Lincoln MD   ALFALFA PO Take by mouth    Marcello Lincoln MD   vitamin D  panel (04/12/2024 12:24 PM EDT)  Performing Organization Address City/State/ZIP Code Phone Number   SARAH BETH          [facility]  [address]  [place] [address]         Back to top of Lab Results  Visit Diagnoses  - documented in this encounter  Visit Diagnoses  Diagnosis   Controlled type 2 diabetes mellitus with hyperglycemia, without long-term current use of insulin (CMS-HCC) - Primary    Autoimmune thyroiditis    Nontoxic nodular goiter   Unspecified nontoxic nodular goiter    Hypercalcemia      Administered Medications  - documented in this encounter  Not on file  Additional Health Concerns  - documented as of this encounter  Not on file  Care Teams  - documented as of this encounter  Care Teams  Team Member Relationship Specialty Start Date End Date   Iesha Dong MD   NPI: [id number]   [address] [place] [address]   [phone] (Work)   [phone] (Fax)  PCP - General Internal Medicine 7/27/22       Patient Demographics    Patient Demographics  Patient Address Communication Language Race / Ethnicity Marital Status   [address] (Home)  [place] [address]    (Apr. 07, 2022 - ):  [address]  [place] [address] [phone] (Home)  [phone] (Work)  [phone] (Mobile)  [email or web address] English - Written (Preferred) Black or  / Not  or  Single     Patient Contacts    Patient Contacts  Contact Name Contact Address Communication Relationship to Patient   Malinda Leblanc Unknown [phone] (Mobile) Sister, Emergency Contact     Document Information    Primary Care Provider Other Service Providers Document Coverage Dates   Iesha Dong MD (Jul. 27, 2022July 27, 2022 - Present)  NPI: [id number]  [phone] (Work)  [phone] (Fax)  [address] [place] [address]  Internal Medicine  [address] [address]  Apr. 12, 2024April 12, 2024      Organization   [facility]

## 2024-05-13 LAB — SURGICAL PATHOLOGY REPORT: NORMAL

## 2024-06-12 ENCOUNTER — OFFICE VISIT (OUTPATIENT)
Dept: SURGERY | Age: 69
End: 2024-06-12

## 2024-06-12 VITALS
HEART RATE: 80 BPM | HEIGHT: 64 IN | BODY MASS INDEX: 42.72 KG/M2 | DIASTOLIC BLOOD PRESSURE: 70 MMHG | SYSTOLIC BLOOD PRESSURE: 112 MMHG | WEIGHT: 250.2 LBS

## 2024-06-12 DIAGNOSIS — A04.8 H. PYLORI INFECTION: Primary | ICD-10-CM

## 2024-06-12 RX ORDER — AMOXICILLIN 500 MG/1
1000 CAPSULE ORAL 2 TIMES DAILY
Qty: 40 CAPSULE | Refills: 0 | Status: SHIPPED | OUTPATIENT
Start: 2024-06-12 | End: 2024-06-22

## 2024-06-12 RX ORDER — CLARITHROMYCIN 500 MG/1
500 TABLET, COATED ORAL 2 TIMES DAILY
Qty: 20 TABLET | Refills: 0 | Status: SHIPPED | OUTPATIENT
Start: 2024-06-12 | End: 2024-06-22

## 2024-06-12 RX ORDER — LANSOPRAZOLE 30 MG/1
30 CAPSULE, DELAYED RELEASE ORAL 2 TIMES DAILY
Qty: 20 CAPSULE | Refills: 0 | Status: SHIPPED | OUTPATIENT
Start: 2024-06-12 | End: 2024-06-22

## 2024-06-12 NOTE — PROGRESS NOTES
Visit Information    Have you changed or started any medications since your last visit including any over-the-counter medicines, vitamins, or herbal medicines? no   Are you having any side effects from any of your medications? -  no  Have you stopped taking any of your medications? Is so, why? -  no    Have you seen any other physician or provider since your last visit? Yes - Records Obtained  Have you had any other diagnostic tests since your last visit? Yes - Records Obtained  Have you been seen in the emergency room and/or had an admission to a hospital since we last saw you? Yes - Records Obtained  Have you had your routine dental cleaning in the past 6 months? no    Have you activated your [employer] account? If not, what are your barriers? Yes     Patient Care Team:  Wendy Nicole MD as PCP - General (Family Medicine)    Medical History Review  Past Medical, Family, and Social History reviewed and does not contribute to the patient presenting condition    Health Maintenance   Topic Date Due    COVID-19 Vaccine (1) Never done    Pneumococcal 65+ years Vaccine (1 of 2 - PCV) Never done    Diabetic foot exam  Never done    Diabetic retinal exam  Never done    DTaP/Tdap/Td vaccine (1 - Tdap) Never done    Shingles vaccine (1 of 2) Never done    Respiratory Syncytial Virus (RSV) Pregnant or age 60 yrs+ (1 - 1-dose 60+ series) Never done    Annual Wellness Visit (Medicare Advantage)  Never done    Diabetic Alb to Cr ratio (uACR) test  06/05/2024    Lipids  06/05/2024    Flu vaccine (Season Ended) 08/01/2024    GFR test (Diabetes, CKD 3-4, OR last GFR 15-59)  10/30/2024    A1C test (Diabetic or Prediabetic)  03/28/2025    Depression Screen  03/28/2025    Breast cancer screen  11/24/2025    Colorectal Cancer Screen  05/09/2034    DEXA (modify frequency per FRAX score)  Completed    Hepatitis C screen  Completed    Hepatitis A vaccine  Aged Out    Hepatitis B vaccine  Aged Out    Hib vaccine  Aged Out    Polio vaccine 
Never true     Ran Out of Food in the Last Year: Never true   Transportation Needs: Unknown (3/28/2024)    PRAPARE - Transportation     Lack of Transportation (Medical): Not on file     Lack of Transportation (Non-Medical): No   Physical Activity: Not on file   Stress: Not on file   Social Connections: Not on file   Intimate Partner Violence: Not on file   Housing Stability: Unknown (3/28/2024)    Housing Stability Vital Sign     Unable to Pay for Housing in the Last Year: Not on file     Number of Places Lived in the Last Year: Not on file     Unstable Housing in the Last Year: No       ROS:   ROS:   GEN: Denies recent weight loss, fatigue, fevers, chills.  HEENT: No vision or hearing changes   CV: No recent chest pain, palpitations   RESP: Denies shortness of breath, wheezing   GI: Endorses bloating and reflux symptoms. Patient denies abdominal pain, nausea/emesis, no bowel changes  : Denies increased frequency or dysuria.  HEM:: Denies easy bruising or bleeding   ENDO: Denies polydipsia, heat or cold intolerance, mood changes  NEURO: Denies speech issues, weakness, HA    Objective   Vitals:    06/12/24 1022   BP: 112/70   Pulse: 80     General:A & O x3  HEENT:  NCAT, EMOI,   Lungs: normal effort, no wheezing, no accessory muscle use   Heart: Regular rate and rhythm   Abdomen: soft, nontender, no guarding or peritoneal signs   Extremity: no edema, motor and sensation intact  Neuro: CN II-XII grossly intact, speech normal      Assessment     Whit Morel is 68-yo F who is s/p EGD and colonoscopy with polypectomy and biopsy positive for H. Pylori on 5/9/24 with worsening reflux symptoms.     Plan     Start triple therapy for H. Pylori infection. Take as prescribed.   Due to finding of polyp on colonoscopy, patient should have repeat colonoscopy in 5 years (polyp unable to be retrieved after removal). This was discussed with her.   Patient to follow up with [facility] in 4 weeks to monitor resolution of 
no

## 2024-06-12 NOTE — PATIENT INSTRUCTIONS
Thank you letting us take care of you today. We hope that all your questions were addressed. If a question was overlooked or something else comes to mind after you return home, please call our office at [phone].    If you need to cancel or change an appointment, surgery or procedure, please contact the office at [phone].

## 2025-05-08 ENCOUNTER — TELEPHONE (OUTPATIENT)
Dept: PHARMACY | Facility: CLINIC | Age: 70
End: 2025-05-08

## 2025-05-08 NOTE — TELEPHONE ENCOUNTER
[facility] CLINICAL PHARMACY: STATIN THERAPY REVIEW  Identified statin use in persons with diabetes care gap per [employer]. Records dated: 4/22/25.    Patient also appears to be prescribed: Diabetes (100% PDC YTD, 90ds last filled 4/4/25)    ASSESSMENT  STATIN GAP IDENTIFIED      Noted baseline LDL <70. Per 3/28/24 PCP note: \"ASCVD risk is 9.7-11.9%; will recheck her lipid panel in 6 months when it is due. Can consider adding a statin at that time if risk is >10%. Pt is motivated today to incorporate more healthy foods and exercise into her life.\" - was to return in @3 months. (Does appear to continue following with external endo)    PLAN  The following are interventions that have been identified:   Statin Gap (Diabetes): recently completed lipid panel at [facility], baseline LDL remains <70. Does appear to be past due for PCP follow-up.    Letter sent to patient.    Last Visit: 3/28/24 with [facility] PCP, 4/4/25 with Leela Merritt, PharmD, BCACP  Population Health Pharmacist  [facility] Clinical Pharmacy  Department, toll free: [phone], option 1    =======================================================   For Pharmacy Admin Tracking Only    Program: [employer]  CPA in place:  No  Gap Closed?: No   Time Spent (min): 15

## 2025-05-27 ENCOUNTER — HOSPITAL ENCOUNTER (OUTPATIENT)
Dept: MAMMOGRAPHY | Age: 70
Discharge: HOME | End: 2025-05-29
Payer: MEDICARE

## 2025-05-27 DIAGNOSIS — Z12.31 ENCOUNTER FOR SCREENING MAMMOGRAM FOR BREAST CANCER: ICD-10-CM

## 2025-05-27 PROCEDURE — 77063 BREAST TOMOSYNTHESIS BI: CPT

## (undated) DEVICE — SET EXTN SM 0.5ML L13IN BOR W/O INJ SITE

## (undated) DEVICE — GLOVE ORANGE PI 8 1/2   MSG9085

## (undated) DEVICE — STAZ ENDO KIT: Brand: MEDLINE INDUSTRIES, INC.

## (undated) DEVICE — TRAP POLYP ETRAP

## (undated) DEVICE — APPLICATOR MEDICATED 10.5 CC SOLUTION HI LT ORNG CHLORAPREP

## (undated) DEVICE — TRAY NRV BLK SUPP CUST

## (undated) DEVICE — GLOVE ORANGE PI 8   MSG9080

## (undated) DEVICE — NEEDLE SPNL 22GA L3.5IN BLK HUB S STL REG WALL FIT STYL W/

## (undated) DEVICE — BITEBLOCK ENDOSCP 60FR MAXI WHT POLYETH STURDY W/ VELC WVN

## (undated) DEVICE — SYRINGE EPI 7ML LUERLOCK RESISTANCE LOSS EPILOR

## (undated) DEVICE — SYRINGE MED 10ML LUERLOCK TIP W/O SFTY DISP

## (undated) DEVICE — SNARE ENDOSCP M L240CM LOOP W27MM SHTH DIA2.4MM OVL FLX

## (undated) DEVICE — BANDAGE ADH W0.75XL3IN NAT PLAS CURAD

## (undated) DEVICE — NEEDLE FLTR 18GA L1.5IN MEM THK5UM BLNT DISP

## (undated) DEVICE — TOWEL,OR,DSP,ST,NATURAL,DLX,4/PK,20PK/CS: Brand: MEDLINE

## (undated) DEVICE — ELECTRODE PT RET AD L9FT HI MOIST COND ADH HYDRGEL CORDED

## (undated) DEVICE — FORCEP BX MESH TOOTH MIC 2.8 MMX240 CM NDL STRL RADIAL JAW 4

## (undated) DEVICE — GLOVE SURG SZ 55 THK91MIL ORANGE  LTX FREE SYN POLYISOPRENE

## (undated) DEVICE — NEEDLE SPNL 25GA L3.5IN BLU HUB S STL REG WALL FIT STYL W/

## (undated) DEVICE — MARKER,SKIN,WI/RULER AND LABELS: Brand: MEDLINE

## (undated) DEVICE — GLOVE ORANGE PI 7   MSG9070